# Patient Record
Sex: FEMALE | Race: WHITE | Employment: OTHER | URBAN - METROPOLITAN AREA
[De-identification: names, ages, dates, MRNs, and addresses within clinical notes are randomized per-mention and may not be internally consistent; named-entity substitution may affect disease eponyms.]

---

## 2017-01-30 PROBLEM — R14.3 EXCESSIVE FLATUS: Status: ACTIVE | Noted: 2017-01-30

## 2017-02-22 PROBLEM — N18.30 CKD (CHRONIC KIDNEY DISEASE) STAGE 3, GFR 30-59 ML/MIN (HCC): Chronic | Status: ACTIVE | Noted: 2017-02-22

## 2017-02-22 PROBLEM — G25.81 RLS (RESTLESS LEGS SYNDROME): Chronic | Status: ACTIVE | Noted: 2017-02-22

## 2017-02-22 PROBLEM — R41.82 ALTERED MENTAL STATUS: Status: ACTIVE | Noted: 2017-02-22

## 2017-02-22 PROBLEM — N39.0 UTI (URINARY TRACT INFECTION): Status: ACTIVE | Noted: 2017-02-22

## 2017-03-03 PROBLEM — F32.1 MODERATE SINGLE CURRENT EPISODE OF MAJOR DEPRESSIVE DISORDER (HCC): Chronic | Status: ACTIVE | Noted: 2017-03-03

## 2018-03-20 ENCOUNTER — HOSPITAL ENCOUNTER (OUTPATIENT)
Age: 83
Discharge: HOME | End: 2018-03-22
Payer: MEDICARE

## 2018-03-21 ENCOUNTER — HOSPITAL ENCOUNTER (OUTPATIENT)
Age: 83
Discharge: HOME | End: 2018-03-23
Payer: COMMERCIAL

## 2018-03-21 LAB
AMORPHOUS: ABNORMAL
BACTERIA: ABNORMAL /HPF
BILIRUBIN URINE: NEGATIVE
BLOOD, URINE: NEGATIVE
CLARITY: CLEAR
COLOR: YELLOW
GLUCOSE URINE: NEGATIVE MG/DL
KETONES, URINE: NEGATIVE MG/DL
LEUKOCYTE ESTERASE, URINE: ABNORMAL
NITRITE, URINE: NEGATIVE
PH UA: 6 (ref 5–9)
PROTEIN UA: ABNORMAL MG/DL
RBC UA: ABNORMAL /HPF (ref 0–2)
RENAL EPITHELIAL, UA: ABNORMAL /HPF
SPECIFIC GRAVITY UA: 1.01 (ref 1–1.03)
UROBILINOGEN, URINE: 0.2 E.U./DL
WBC UA: ABNORMAL /HPF (ref 0–5)

## 2018-03-21 PROCEDURE — 81001 URINALYSIS AUTO W/SCOPE: CPT

## 2018-03-21 PROCEDURE — 87088 URINE BACTERIA CULTURE: CPT

## 2018-03-21 PROCEDURE — 87186 SC STD MICRODIL/AGAR DIL: CPT

## 2018-03-21 PROCEDURE — 87077 CULTURE AEROBIC IDENTIFY: CPT

## 2018-03-24 LAB
ORGANISM: ABNORMAL
URINE CULTURE, ROUTINE: ABNORMAL
URINE CULTURE, ROUTINE: ABNORMAL

## 2018-04-02 ENCOUNTER — HOSPITAL ENCOUNTER (OUTPATIENT)
Age: 83
Discharge: HOME | End: 2018-04-04
Payer: COMMERCIAL

## 2018-04-02 LAB
ALBUMIN SERPL-MCNC: 3.9 G/DL (ref 3.5–5.2)
ALP BLD-CCNC: 63 U/L (ref 35–104)
ALT SERPL-CCNC: 6 U/L (ref 0–32)
ANION GAP SERPL CALCULATED.3IONS-SCNC: 12 MMOL/L (ref 7–16)
AST SERPL-CCNC: 8 U/L (ref 0–31)
BILIRUB SERPL-MCNC: 0.2 MG/DL (ref 0–1.2)
BUN BLDV-MCNC: 21 MG/DL (ref 8–23)
CALCIUM SERPL-MCNC: 9.1 MG/DL (ref 8.6–10.2)
CHLORIDE BLD-SCNC: 104 MMOL/L (ref 98–107)
CHOLESTEROL, TOTAL: 138 MG/DL (ref 0–199)
CO2: 26 MMOL/L (ref 22–29)
CREAT SERPL-MCNC: 1 MG/DL (ref 0.5–1)
GFR AFRICAN AMERICAN: >60
GFR NON-AFRICAN AMERICAN: 52 ML/MIN/1.73
GLUCOSE BLD-MCNC: 100 MG/DL (ref 74–109)
HBA1C MFR BLD: 5.3 % (ref 4.8–5.9)
HCT VFR BLD CALC: 31.6 % (ref 34–48)
HDLC SERPL-MCNC: 45 MG/DL
HEMOGLOBIN: 10.1 G/DL (ref 11.5–15.5)
LDL CHOLESTEROL CALCULATED: 65 MG/DL (ref 0–99)
MCH RBC QN AUTO: 28.1 PG (ref 26–35)
MCHC RBC AUTO-ENTMCNC: 32 % (ref 32–34.5)
MCV RBC AUTO: 87.8 FL (ref 80–99.9)
PDW BLD-RTO: 13.6 FL (ref 11.5–15)
PLATELET # BLD: 115 E9/L (ref 130–450)
PMV BLD AUTO: 11 FL (ref 7–12)
POTASSIUM SERPL-SCNC: 3.9 MMOL/L (ref 3.5–5)
RBC # BLD: 3.6 E12/L (ref 3.5–5.5)
SODIUM BLD-SCNC: 142 MMOL/L (ref 132–146)
TOTAL PROTEIN: 6.2 G/DL (ref 6.4–8.3)
TRIGL SERPL-MCNC: 141 MG/DL (ref 0–149)
VLDLC SERPL CALC-MCNC: 28 MG/DL
WBC # BLD: 4.2 E9/L (ref 4.5–11.5)

## 2018-04-02 PROCEDURE — 84443 ASSAY THYROID STIM HORMONE: CPT

## 2018-04-02 PROCEDURE — 80053 COMPREHEN METABOLIC PANEL: CPT

## 2018-04-02 PROCEDURE — 36415 COLL VENOUS BLD VENIPUNCTURE: CPT

## 2018-04-02 PROCEDURE — 80061 LIPID PANEL: CPT

## 2018-04-02 PROCEDURE — 83036 HEMOGLOBIN GLYCOSYLATED A1C: CPT

## 2018-04-02 PROCEDURE — 85027 COMPLETE CBC AUTOMATED: CPT

## 2018-04-03 LAB — TSH SERPL DL<=0.05 MIU/L-ACNC: 1.19 UIU/ML (ref 0.27–4.2)

## 2018-07-03 ENCOUNTER — HOSPITAL ENCOUNTER (OUTPATIENT)
Age: 83
Discharge: HOME | End: 2018-07-05
Payer: COMMERCIAL

## 2018-07-03 LAB
BACTERIA: ABNORMAL /HPF
BILIRUBIN URINE: NEGATIVE
BLOOD, URINE: ABNORMAL
CLARITY: ABNORMAL
COLOR: YELLOW
GLUCOSE URINE: NEGATIVE MG/DL
KETONES, URINE: NEGATIVE MG/DL
LEUKOCYTE ESTERASE, URINE: ABNORMAL
NITRITE, URINE: NEGATIVE
PH UA: 6 (ref 5–9)
PROTEIN UA: 30 MG/DL
RBC UA: ABNORMAL /HPF (ref 0–2)
SPECIFIC GRAVITY UA: 1.02 (ref 1–1.03)
UROBILINOGEN, URINE: 0.2 E.U./DL
WBC UA: ABNORMAL /HPF (ref 0–5)

## 2018-07-03 PROCEDURE — 87184 SC STD DISK METHOD PER PLATE: CPT

## 2018-07-03 PROCEDURE — 87186 SC STD MICRODIL/AGAR DIL: CPT

## 2018-07-03 PROCEDURE — 87088 URINE BACTERIA CULTURE: CPT

## 2018-07-03 PROCEDURE — 81001 URINALYSIS AUTO W/SCOPE: CPT

## 2018-07-03 PROCEDURE — 87077 CULTURE AEROBIC IDENTIFY: CPT

## 2018-07-07 ENCOUNTER — HOSPITAL ENCOUNTER (INPATIENT)
Age: 83
LOS: 2 days | Discharge: NURSING, REHAB OR LONG-TERM CARE | DRG: 065 | End: 2018-07-09
Attending: EMERGENCY MEDICINE | Admitting: INTERNAL MEDICINE
Payer: COMMERCIAL

## 2018-07-07 ENCOUNTER — APPOINTMENT (OUTPATIENT)
Dept: CT IMAGING | Age: 83
DRG: 065 | End: 2018-07-07
Payer: COMMERCIAL

## 2018-07-07 ENCOUNTER — APPOINTMENT (OUTPATIENT)
Dept: MRI IMAGING | Age: 83
DRG: 065 | End: 2018-07-07
Payer: COMMERCIAL

## 2018-07-07 ENCOUNTER — APPOINTMENT (OUTPATIENT)
Dept: ULTRASOUND IMAGING | Age: 83
DRG: 065 | End: 2018-07-07
Payer: COMMERCIAL

## 2018-07-07 ENCOUNTER — APPOINTMENT (OUTPATIENT)
Dept: GENERAL RADIOLOGY | Age: 83
DRG: 065 | End: 2018-07-07
Payer: COMMERCIAL

## 2018-07-07 DIAGNOSIS — G45.9 TRANSIENT CEREBRAL ISCHEMIA, UNSPECIFIED TYPE: Primary | ICD-10-CM

## 2018-07-07 PROBLEM — I63.9 CEREBROVASCULAR ACCIDENT (CVA) DETERMINED BY CLINICAL ASSESSMENT (HCC): Status: ACTIVE | Noted: 2018-07-07

## 2018-07-07 LAB
ALBUMIN SERPL-MCNC: 4.1 G/DL (ref 3.5–5.2)
ALP BLD-CCNC: 75 U/L (ref 35–104)
ALT SERPL-CCNC: 9 U/L (ref 0–32)
ANION GAP SERPL CALCULATED.3IONS-SCNC: 12 MMOL/L (ref 7–16)
AST SERPL-CCNC: 12 U/L (ref 0–31)
BASOPHILS ABSOLUTE: 0.04 E9/L (ref 0–0.2)
BASOPHILS RELATIVE PERCENT: 0.8 % (ref 0–2)
BILIRUB SERPL-MCNC: 0.2 MG/DL (ref 0–1.2)
BILIRUBIN URINE: NEGATIVE
BLOOD, URINE: NEGATIVE
BUN BLDV-MCNC: 19 MG/DL (ref 8–23)
CALCIUM SERPL-MCNC: 9.7 MG/DL (ref 8.6–10.2)
CHLORIDE BLD-SCNC: 103 MMOL/L (ref 98–107)
CLARITY: CLEAR
CO2: 28 MMOL/L (ref 22–29)
COLOR: YELLOW
CREAT SERPL-MCNC: 1.1 MG/DL (ref 0.5–1)
EKG ATRIAL RATE: 57 BPM
EKG P AXIS: 30 DEGREES
EKG P-R INTERVAL: 168 MS
EKG Q-T INTERVAL: 472 MS
EKG QRS DURATION: 136 MS
EKG QTC CALCULATION (BAZETT): 459 MS
EKG R AXIS: 1 DEGREES
EKG T AXIS: 144 DEGREES
EKG VENTRICULAR RATE: 57 BPM
EOSINOPHILS ABSOLUTE: 0.2 E9/L (ref 0.05–0.5)
EOSINOPHILS RELATIVE PERCENT: 3.9 % (ref 0–6)
GFR AFRICAN AMERICAN: 57
GFR NON-AFRICAN AMERICAN: 47 ML/MIN/1.73
GLUCOSE BLD-MCNC: 113 MG/DL (ref 74–109)
GLUCOSE URINE: NEGATIVE MG/DL
HCT VFR BLD CALC: 34.3 % (ref 34–48)
HEMOGLOBIN: 11 G/DL (ref 11.5–15.5)
IMMATURE GRANULOCYTES #: 0.01 E9/L
IMMATURE GRANULOCYTES %: 0.2 % (ref 0–5)
KETONES, URINE: NEGATIVE MG/DL
LEUKOCYTE ESTERASE, URINE: NEGATIVE
LYMPHOCYTES ABSOLUTE: 1.56 E9/L (ref 1.5–4)
LYMPHOCYTES RELATIVE PERCENT: 30.6 % (ref 20–42)
MCH RBC QN AUTO: 27.9 PG (ref 26–35)
MCHC RBC AUTO-ENTMCNC: 32.1 % (ref 32–34.5)
MCV RBC AUTO: 87.1 FL (ref 80–99.9)
METER GLUCOSE: 105 MG/DL (ref 70–110)
MONOCYTES ABSOLUTE: 0.52 E9/L (ref 0.1–0.95)
MONOCYTES RELATIVE PERCENT: 10.2 % (ref 2–12)
NEUTROPHILS ABSOLUTE: 2.77 E9/L (ref 1.8–7.3)
NEUTROPHILS RELATIVE PERCENT: 54.3 % (ref 43–80)
NITRITE, URINE: NEGATIVE
PDW BLD-RTO: 13 FL (ref 11.5–15)
PH UA: 7 (ref 5–9)
PLATELET # BLD: 143 E9/L (ref 130–450)
PMV BLD AUTO: 10.7 FL (ref 7–12)
POTASSIUM SERPL-SCNC: 4.1 MMOL/L (ref 3.5–5)
PROTEIN UA: NEGATIVE MG/DL
RBC # BLD: 3.94 E12/L (ref 3.5–5.5)
SODIUM BLD-SCNC: 143 MMOL/L (ref 132–146)
SPECIFIC GRAVITY UA: <=1.005 (ref 1–1.03)
TOTAL PROTEIN: 7 G/DL (ref 6.4–8.3)
TROPONIN: <0.01 NG/ML (ref 0–0.03)
UROBILINOGEN, URINE: 0.2 E.U./DL
WBC # BLD: 5.1 E9/L (ref 4.5–11.5)

## 2018-07-07 PROCEDURE — 71045 X-RAY EXAM CHEST 1 VIEW: CPT

## 2018-07-07 PROCEDURE — 82962 GLUCOSE BLOOD TEST: CPT

## 2018-07-07 PROCEDURE — 97161 PT EVAL LOW COMPLEX 20 MIN: CPT

## 2018-07-07 PROCEDURE — 99285 EMERGENCY DEPT VISIT HI MDM: CPT

## 2018-07-07 PROCEDURE — 80053 COMPREHEN METABOLIC PANEL: CPT

## 2018-07-07 PROCEDURE — 97166 OT EVAL MOD COMPLEX 45 MIN: CPT

## 2018-07-07 PROCEDURE — 2580000003 HC RX 258: Performed by: INTERNAL MEDICINE

## 2018-07-07 PROCEDURE — 36415 COLL VENOUS BLD VENIPUNCTURE: CPT

## 2018-07-07 PROCEDURE — 93005 ELECTROCARDIOGRAM TRACING: CPT | Performed by: EMERGENCY MEDICINE

## 2018-07-07 PROCEDURE — 70450 CT HEAD/BRAIN W/O DYE: CPT

## 2018-07-07 PROCEDURE — 99221 1ST HOSP IP/OBS SF/LOW 40: CPT | Performed by: PSYCHIATRY & NEUROLOGY

## 2018-07-07 PROCEDURE — G8978 MOBILITY CURRENT STATUS: HCPCS

## 2018-07-07 PROCEDURE — 6370000000 HC RX 637 (ALT 250 FOR IP): Performed by: INTERNAL MEDICINE

## 2018-07-07 PROCEDURE — 2060000000 HC ICU INTERMEDIATE R&B

## 2018-07-07 PROCEDURE — 84484 ASSAY OF TROPONIN QUANT: CPT

## 2018-07-07 PROCEDURE — G8988 SELF CARE GOAL STATUS: HCPCS

## 2018-07-07 PROCEDURE — 6360000002 HC RX W HCPCS: Performed by: INTERNAL MEDICINE

## 2018-07-07 PROCEDURE — 85025 COMPLETE CBC W/AUTO DIFF WBC: CPT

## 2018-07-07 PROCEDURE — 81003 URINALYSIS AUTO W/O SCOPE: CPT

## 2018-07-07 PROCEDURE — 6370000000 HC RX 637 (ALT 250 FOR IP)

## 2018-07-07 PROCEDURE — G8979 MOBILITY GOAL STATUS: HCPCS

## 2018-07-07 PROCEDURE — 70551 MRI BRAIN STEM W/O DYE: CPT

## 2018-07-07 PROCEDURE — 93880 EXTRACRANIAL BILAT STUDY: CPT

## 2018-07-07 PROCEDURE — G8987 SELF CARE CURRENT STATUS: HCPCS

## 2018-07-07 PROCEDURE — 6370000000 HC RX 637 (ALT 250 FOR IP): Performed by: EMERGENCY MEDICINE

## 2018-07-07 PROCEDURE — 94761 N-INVAS EAR/PLS OXIMETRY MLT: CPT

## 2018-07-07 RX ORDER — SIMVASTATIN 20 MG
20 TABLET ORAL EVERY EVENING
Status: DISCONTINUED | OUTPATIENT
Start: 2018-07-07 | End: 2018-07-07

## 2018-07-07 RX ORDER — ASPIRIN 81 MG/1
81 TABLET ORAL DAILY
Status: DISCONTINUED | OUTPATIENT
Start: 2018-07-07 | End: 2018-07-09 | Stop reason: HOSPADM

## 2018-07-07 RX ORDER — CARBIDOPA/LEVODOPA 25MG-250MG
1 TABLET ORAL 3 TIMES DAILY
Refills: 11 | COMMUNITY
Start: 2018-06-15 | End: 2018-09-05

## 2018-07-07 RX ORDER — CLOPIDOGREL BISULFATE 75 MG/1
75 TABLET ORAL DAILY
Status: DISCONTINUED | OUTPATIENT
Start: 2018-07-07 | End: 2018-07-09 | Stop reason: HOSPADM

## 2018-07-07 RX ORDER — AMLODIPINE BESYLATE 10 MG/1
10 TABLET ORAL DAILY
Status: DISCONTINUED | OUTPATIENT
Start: 2018-07-07 | End: 2018-07-09 | Stop reason: HOSPADM

## 2018-07-07 RX ORDER — ASPIRIN 81 MG/1
324 TABLET, CHEWABLE ORAL ONCE
Status: COMPLETED | OUTPATIENT
Start: 2018-07-07 | End: 2018-07-07

## 2018-07-07 RX ORDER — SUCRALFATE 1 G/1
1 TABLET ORAL
Status: DISCONTINUED | OUTPATIENT
Start: 2018-07-07 | End: 2018-07-09 | Stop reason: HOSPADM

## 2018-07-07 RX ORDER — MIRTAZAPINE 15 MG/1
7.5 TABLET, FILM COATED ORAL NIGHTLY
Status: DISCONTINUED | OUTPATIENT
Start: 2018-07-07 | End: 2018-07-09 | Stop reason: HOSPADM

## 2018-07-07 RX ORDER — ATORVASTATIN CALCIUM 40 MG/1
40 TABLET, FILM COATED ORAL NIGHTLY
Status: DISCONTINUED | OUTPATIENT
Start: 2018-07-07 | End: 2018-07-09 | Stop reason: HOSPADM

## 2018-07-07 RX ORDER — ACETAMINOPHEN 325 MG/1
650 TABLET ORAL EVERY 4 HOURS PRN
COMMUNITY

## 2018-07-07 RX ORDER — SIMETHICONE 80 MG
80 TABLET,CHEWABLE ORAL 4 TIMES DAILY PRN
Status: DISCONTINUED | OUTPATIENT
Start: 2018-07-07 | End: 2018-07-09 | Stop reason: HOSPADM

## 2018-07-07 RX ORDER — CARBIDOPA/LEVODOPA 25MG-250MG
1 TABLET ORAL 3 TIMES DAILY
Status: DISCONTINUED | OUTPATIENT
Start: 2018-07-07 | End: 2018-07-09 | Stop reason: HOSPADM

## 2018-07-07 RX ORDER — LINACLOTIDE 290 UG/1
290 CAPSULE, GELATIN COATED ORAL DAILY
Refills: 11 | COMMUNITY
Start: 2018-06-14

## 2018-07-07 RX ORDER — ACETAMINOPHEN 325 MG/1
650 TABLET ORAL EVERY 4 HOURS PRN
Status: DISCONTINUED | OUTPATIENT
Start: 2018-07-07 | End: 2018-07-09 | Stop reason: HOSPADM

## 2018-07-07 RX ORDER — BISACODYL 10 MG
10 SUPPOSITORY, RECTAL RECTAL DAILY PRN
Status: DISCONTINUED | OUTPATIENT
Start: 2018-07-07 | End: 2018-07-09 | Stop reason: HOSPADM

## 2018-07-07 RX ORDER — LISINOPRIL 20 MG/1
20 TABLET ORAL DAILY
Status: DISCONTINUED | OUTPATIENT
Start: 2018-07-07 | End: 2018-07-09

## 2018-07-07 RX ORDER — UBIDECARENONE 75 MG
1000 CAPSULE ORAL DAILY
COMMUNITY

## 2018-07-07 RX ORDER — FAMOTIDINE 20 MG/1
20 TABLET, FILM COATED ORAL DAILY
Status: DISCONTINUED | OUTPATIENT
Start: 2018-07-07 | End: 2018-07-09 | Stop reason: HOSPADM

## 2018-07-07 RX ORDER — CLOPIDOGREL BISULFATE 75 MG/1
TABLET ORAL
Status: COMPLETED
Start: 2018-07-07 | End: 2018-07-07

## 2018-07-07 RX ORDER — BISACODYL 10 MG
10 SUPPOSITORY, RECTAL RECTAL DAILY PRN
COMMUNITY

## 2018-07-07 RX ORDER — SODIUM CHLORIDE 0.9 % (FLUSH) 0.9 %
10 SYRINGE (ML) INJECTION PRN
Status: DISCONTINUED | OUTPATIENT
Start: 2018-07-07 | End: 2018-07-09 | Stop reason: HOSPADM

## 2018-07-07 RX ORDER — ACETAMINOPHEN 325 MG/1
650 TABLET ORAL DAILY
Status: ON HOLD | COMMUNITY
End: 2018-07-09 | Stop reason: HOSPADM

## 2018-07-07 RX ORDER — ONDANSETRON 2 MG/ML
4 INJECTION INTRAMUSCULAR; INTRAVENOUS EVERY 6 HOURS PRN
Status: DISCONTINUED | OUTPATIENT
Start: 2018-07-07 | End: 2018-07-09 | Stop reason: HOSPADM

## 2018-07-07 RX ORDER — NITROFURANTOIN 25; 75 MG/1; MG/1
100 CAPSULE ORAL 2 TIMES DAILY
Status: DISCONTINUED | OUTPATIENT
Start: 2018-07-07 | End: 2018-07-09 | Stop reason: HOSPADM

## 2018-07-07 RX ORDER — SODIUM CHLORIDE 0.9 % (FLUSH) 0.9 %
10 SYRINGE (ML) INJECTION EVERY 12 HOURS SCHEDULED
Status: DISCONTINUED | OUTPATIENT
Start: 2018-07-07 | End: 2018-07-09 | Stop reason: HOSPADM

## 2018-07-07 RX ORDER — NITROFURANTOIN 25; 75 MG/1; MG/1
100 CAPSULE ORAL 2 TIMES DAILY
Refills: 0 | Status: ON HOLD | COMMUNITY
Start: 2018-07-06 | End: 2018-07-09 | Stop reason: HOSPADM

## 2018-07-07 RX ADMIN — LISINOPRIL 20 MG: 20 TABLET ORAL at 13:28

## 2018-07-07 RX ADMIN — AMLODIPINE BESYLATE 10 MG: 10 TABLET ORAL at 13:28

## 2018-07-07 RX ADMIN — ATORVASTATIN CALCIUM 40 MG: 40 TABLET, FILM COATED ORAL at 22:42

## 2018-07-07 RX ADMIN — MIRTAZAPINE 7.5 MG: 15 TABLET, FILM COATED ORAL at 22:41

## 2018-07-07 RX ADMIN — SUCRALFATE 1 G: 1 TABLET ORAL at 13:36

## 2018-07-07 RX ADMIN — Medication 10 ML: at 22:43

## 2018-07-07 RX ADMIN — FAMOTIDINE 20 MG: 20 TABLET ORAL at 13:36

## 2018-07-07 RX ADMIN — CLOPIDOGREL BISULFATE 75 MG: 75 TABLET ORAL at 15:39

## 2018-07-07 RX ADMIN — ENOXAPARIN SODIUM 30 MG: 30 INJECTION SUBCUTANEOUS at 13:36

## 2018-07-07 RX ADMIN — SUCRALFATE 1 G: 1 TABLET ORAL at 22:42

## 2018-07-07 RX ADMIN — ASPIRIN 81 MG CHEWABLE TABLET 324 MG: 81 TABLET CHEWABLE at 07:19

## 2018-07-07 RX ADMIN — Medication 10 ML: at 13:29

## 2018-07-07 RX ADMIN — CARBIDOPA AND LEVODOPA 1 TABLET: 25; 250 TABLET ORAL at 22:41

## 2018-07-07 RX ADMIN — CARBIDOPA AND LEVODOPA 1 TABLET: 25; 250 TABLET ORAL at 13:27

## 2018-07-07 RX ADMIN — LINACLOTIDE 290 MCG: 145 CAPSULE, GELATIN COATED ORAL at 13:27

## 2018-07-07 ASSESSMENT — ENCOUNTER SYMPTOMS
CHEST TIGHTNESS: 0
ABDOMINAL PAIN: 0
TROUBLE SWALLOWING: 0
SHORTNESS OF BREATH: 0
BACK PAIN: 0
VOMITING: 0
NAUSEA: 0

## 2018-07-07 ASSESSMENT — PAIN SCALES - GENERAL
PAINLEVEL_OUTOF10: 0
PAINLEVEL_OUTOF10: 0

## 2018-07-07 NOTE — ED NOTES
Monitor received and placed on pt, rhythm verified on floor by [facility], transport paged     Miko James RN  07/07/18 2192

## 2018-07-07 NOTE — PROGRESS NOTES
Occupational Therapy  OCCUPATIONAL THERAPY INITIAL EVALUATION      Date:2018  Patient Name: Jules Murphy  MRN: [id number]  : 9/15/1929  Room: [address] [place] [address]     Evaluating OT: Genesis Sears OTR/L [id number]    AM-[facility] In[facility] Daily Activity Raw Score: ; G-CODE: CK    Modified [place] Scale (MRS)  Score     Description  0             No symptoms  1             No significant disability despite symptoms  2             Slight disability; able to look after own affairs  3             Moderate disability; able to ambulate without assist/ requires assist with ADLs  4             Moderate/Severe disability;requires assist to ambulate/assist with ADLs  5             Severe disability;bedridden/incontinent   6               Score:  1      Recommended Adaptive Equipment: TBD      Diagnosis:   Patient Active Problem List   Diagnosis    GERD (gastroesophageal reflux disease)    HTN (hypertension)    Depression with anxiety    Hyperlipidemia    Chronic back pain    Chronic constipation    Excessive flatus    UTI (urinary tract infection)    Altered mental status    CKD (chronic kidney disease) stage 3, GFR 30-59 ml/min    RLS (restless legs syndrome)    Moderate single current episode of major depressive disorder (HCC)    TIA (transient ischemic attack)       Surgery:   Past Surgical History:   Procedure Laterality Date    APPENDECTOMY      BACK SURGERY      BLADDER SURGERY      COLONOSCOPY      ENDOSCOPY, COLON, DIAGNOSTIC      EYE SURGERY      HYSTERECTOMY      SPINE SURGERY          Past Medical History:   Past Medical History:   Diagnosis Date    Arthritis     Blood circulation, collateral     Chronic kidney disease     GERD (gastroesophageal reflux disease)     Hyperlipidemia     Hypertension     Moderate single current [facility] of [facility] 3/3/2017    Movement disorder     Psychiatric problem     UTI (urinary tract infection) 2017       Precautions: falls, [place], L LE numbness       Home Living: Resident at [address] [place] [address] owned: [employer]    Prior Level of Function: pt reports she was indep with ADLs & Mod I with [employer]       Pain Level: 0/10    Cognition: oriented x 3; follows 2 step directions. Sup Problem solving skills  Sup Memory   indep Sequencing  SupSafety Awareness/Judgement  Additional Comments: N/A     Sensory:   Hearing: [place]  Vision: wears reading glasses       UE Assessment:  Hand Dominance: Right [x]  Left []     Strength ROM             Additional Info:    RUE           4/5             WFL            WFL               N/A      LUE 4/5 WFL            WFL               N/A        Sensation: intact  Tone: normal  Edema:N/A   FMC: WFL  GMC: WFL    Functional Assessment:   Initial Status: Comments:                               GOALS:   Feeding  setup N/A  indep   Grooming  Sup N/A  indep   Upper Body Dressing Kirsty   N/A   SBA    Lower Body Dressing Mod A  N/A  Kirsty     Bathing Mod A  N/A  Kirsty     Toileting  Min A  N/A  SBA    Bed Mobility  SBA  N/A  Sup   Functional Transfers Kirsty  [employer]  SBA     Functional Mobility Kirsty   [employer] SBA     Pt/Family participated in establishment of the goals. Sit balance: indep  Stand balance: SBA   Endurance/Activity tolerance: fair                              Comments: Upon arrival, pt supine in bed. At end of session, pt supine in bed with all devices within reach. Treatment: Patient performs func tasks as reported above. OT facilitated bed mobility/func mobility/toilet trf with [employer]. Educated pt/family in safety. Patient demonstrates fair understanding of education/instructions. Will benefit from continued OT intervention to increase participation in ADL tasks.      Assessment of current deficits   Functional mobility [x]  ADLs [x] Strength []  Cognition []  Functional transfers  [x] IADLs [] Safety Awareness [x]  Endurance [x]  Fine Motor Coordination [] Balance [x] Vision/perception [] Sensation []   Gross Motor Coordination [] ROM []        Eval Complexity: mod  Profile and History- mod  Assessment of Occupational Performance and Identification of Deficits- mod  Clinical Decision Making- mod    Treatment frequency: PRN     Plan of Care:  ADL retraining [x]   Equipment needs [x]   Neuromuscular re-education [x] Energy Conservation Techniques []  Functional Transfer training [x] Patient and/or Family Education [x]  Functional Mobility training [x]  Environmental Modifications []  Cognitive re-training []   Splinting Needs []     Positioning to improve overall function []  Other: []      Rehab Potential: fair for established goals     Patient / Family Goal: to go home       Patient and/or family verbalizes understanding of diagnosis, prognosis, goals and plan of care: yes    [] Malnutrition indicators have been identified and nursing has been notified to ensure a dietitian consult is ordered.      Time in: 11:00 am   Time out: 11:15 am   Eval Time: 15 min   Total Tx Time: 0 min     Bruce Jimenez, OTR/L [id number]

## 2018-07-07 NOTE — ED PROVIDER NOTES
This is an 80-year-old female that presents from nursing home for strokelike symptoms. She states that earlier in the left-sided weakness. ECF report that she was also having slurred speech. This has since resolved. They state that it occurred around 0300 and last known well was around 2300. EMS noted no symptoms when they arrived. Patient states that she has no stroke history. She is unsure of any medical problems or medications that she takes. She denies any traumas. Believes she had recently been diagnosed with a UTI, unsure what treatment she is taking. The history is provided by the patient and the EMS personnel. No  was used. Extremity Weakness   Severity:  Mild  Onset quality:  Sudden  Progression:  Resolved  Chronicity:  New  Context: urinary tract infection    Context: not alcohol use, not decreased sleep, not dehydration, not drug use, not increased activity and not stress    Associated symptoms: no abdominal pain, no chest pain, no dizziness, no drooling, no dysphagia, no dysuria, no falls, no fever, no frequency, no lethargy, no nausea, no shortness of breath, no urgency and no vomiting    Associated symptoms comment:  Ambulates with walker - this is unchanged  Risk factors: no coronary artery disease, no diabetes and no family hx of stroke        Review of Systems   Constitutional: Negative for activity change, appetite change, chills, fatigue and fever. HENT: Negative for drooling and trouble swallowing. Eyes: Negative for visual disturbance. Respiratory: Negative for chest tightness and shortness of breath. Cardiovascular: Negative for chest pain. Gastrointestinal: Negative for abdominal pain, dysphagia, nausea and vomiting. Genitourinary: Negative for dysuria, frequency and urgency. Musculoskeletal: Negative for back pain, falls, neck pain and neck stiffness. Skin: Negative for rash and wound.    Neurological: Negative for dizziness, light-headedness and (includes targeted exams where dose is matched to    clinical    indication); or iterative reconstruction. Coronal and sagittal reformatted images were created and reviewed. COMPARISON:     CTB HEAD W/O CONTRAST 2017-03-01 23:15      FINDINGS:     Brain:  Generalized parenchymal atrophy present which is compatible with       patient age. Chronic ischemic gliotic white matter changes present. No    acute    subarachnoid, parenchymal or intraventricular hemorrhage. No abnormal    mass    lesion, midline shift or mass effect. No acute subdural or epidural    hematoma. No definite acute infarct. Ventricles:  Atrophy related ventriculomegaly/ex vacuo dilatation    present. Bones/joints:  No acute calvarial fracture. Soft tissues:  No mass. Vasculature:  Arterial atheromatous vascular calcifications present. Sinuses:  No acute sinusitis. Mastoid air cells:  Significant right mastoid air cell fluid. Minimal    left    mastoid air cell fluid. IMPRESSION:          No acute intracranial abnormality. Remainder of findings as above. Using a telephone device, Dr. Henny Yañez discussed the findings of the report    with    Dr. Pablo Grewal at 7:08 AM EDT on 7/7/2018 . This addendum has been electronically signed by Mahendra Fairchild MD.      Final     No acute intracranial abnormality. Remainder of findings as above. This report has been electronically signed by Mahendra Fairchild MD.      XR CHEST PORTABLE    (Results Pending)       EKG: This EKG is signed and interpreted by ED Physician. Time: 527  Rate: 57  Rhythm: sinus bradycardia, LBBB. Interpretation: sinus bradycardia lbbb, t wave inversions leads 1, aVL, V5-6. Comparison: new T wave inversions.    ---------------------------- NURSING NOTES AND VITALS REVIEWED -------------------------   The nursing notes within the ED encounter and vital signs as below have been reviewed.    BP (!) 189/77   Pulse 59

## 2018-07-07 NOTE — PROGRESS NOTES
Dr. Dani Escobar went in to see the patient and the patient reported that her symptoms had returned.  Patient's L side has become weak again, a change from her earlier neuro exam.  Blood pressure [id number] Dr. Dani Escobar aware

## 2018-07-07 NOTE — CONSULTS
Gaston Badillo is a 80 y.o. woman  Neurology consulted for stroke    Past Medical History:     Past Medical History:   Diagnosis Date    Arthritis     Blood circulation, collateral     Chronic kidney disease     GERD (gastroesophageal reflux disease)     Hyperlipidemia     Hypertension     Moderate single current episode of major depressive disorder ([facility] 3/3/2017    Movement disorder     Psychiatric problem     UTI (urinary tract infection) 2/22/2017       Past Surgical History:     Past Surgical History:   Procedure Laterality Date    APPENDECTOMY      BACK SURGERY      BLADDER SURGERY      COLONOSCOPY      ENDOSCOPY, COLON, DIAGNOSTIC      EYE SURGERY      HYSTERECTOMY      SPINE SURGERY         Allergies:     Penicillins    Medications:     Prior to Admission medications    Medication Sig Start Date End Date Taking?  Authorizing Provider   acetaminophen (TYLENOL) 325 MG tablet Take 650 mg by mouth daily   Yes Historical Provider, MD   acetaminophen (TYLENOL) 325 MG tablet Take 650 mg by mouth every 4 hours as needed for Pain or Fever   Yes Historical Provider, MD   hypromellose 0.4 % SOLN ophthalmic solution Place 1 drop into both eyes every 6 hours as needed   Yes Historical Provider, MD   bisacodyl (DULCOLAX) 10 MG suppository Place 10 mg rectally daily as needed for Constipation   Yes Historical Provider, MD   LINZESS 290 MCG CAPS capsule Take 290 mcg by mouth daily 6/14/18  Yes Historical Provider, MD   Cholecalciferol (VITAMIN D3) 5000 units CAPS Take 5,000 Units by mouth daily 6/15/18  Yes Historical Provider, MD   nitrofurantoin, macrocrystal-monohydrate, (MACROBID) 100 MG capsule Take 100 mg by mouth 2 times daily 7/6/18 7/7/18 Yes Historical Provider, MD   carbidopa-levodopa (SINEMET)  MG per tablet Take 1 tablet by mouth 3 times daily 6/15/18  Yes Historical Provider, MD   vitamin B-12 (CYANOCOBALAMIN) 100 MCG tablet Take 1,000 mcg by mouth daily   Yes Historical Provider, MD   mirtazapine (REMERON) 7.5 MG tablet Take 1 tablet by mouth nightly 3/8/17  Yes Kaylah Urbina MD   sucralfate (CARAFATE) 1 GM tablet Take 1 tablet by mouth 4 times daily (with meals and nightly) 2/27/17  Yes Karo Zhou MD   simethicone (MYLICON) 80 MG chewable tablet Take 1 tablet by mouth 4 times daily as needed for Flatulence 1/30/17  Yes Momo Martel MD   simvastatin (ZOCOR) 20 MG tablet Take 1 tablet by mouth every evening 1/6/17  Yes Momo Martel MD   aspirin 81 MG EC tablet Take 81 mg by mouth daily. Yes Historical Provider, MD       Social History:     nonhsmoker    Review of Systems:     ROS    All others negative      Family History:     History reviewed. No pertinent family history. History of Present Illness:   History obtained from pt and EMR  Pt presented with sudden onset left sided weakness which resolved in ED. Since then it has fluctuated. Per nursing she had no weakness prior to mri. Per patient she had some weakness at time of MRI but it is now weaker. During my evaluation around 3pm, pt has NIHSS of 5. Blood pressure 180s at this time. Objective:   BP (!) 184/73   Pulse 61   Temp 97 °F (36.1 °C)   Resp 18   Ht 5' (1.524 m)   Wt 150 lb (68 kg)   SpO2 96%   BMI 29.29 kg/m²       Neurologic Exam     Mental Status   Oriented to person, place, and time. Attention: decreased. Concentration: decreased. Speech: speech is normal   Level of consciousness: alert  Knowledge: good. Able to name object. Able to read. Able to repeat. Normal comprehension. Poor memory evident. Cranial Nerves     CN II   Visual fields full to confrontation. CN III, IV, VI   Pupils are equal, round, and reactive to light. Extraocular motions are normal.     CN V   Facial sensation intact. CN VII   Facial expression full, symmetric.      CN VIII   Hearing: intact    CN IX, X   Palate: symmetric    CN XI   Left trapezius strength: weak    CN XII

## 2018-07-07 NOTE — H&P
[facility] Internal Medicine  History and Physical      CHIEF COMPLAINT:  Left sided weakness, speech issues    Reason for Admission:  Suspected cva    History Obtained From:  Patient and emr    PCP :  Karthik Johnson MD    [address] [place] [address], [address] / [facility] [id number]      HISTORY OF PRESENT ILLNESS:      The patient is a 80 y.o. female sent in from nursing home for left sided weakness and speech issues. Apparently her symptoms resolved as per ED documentation how ever patient still reporting left sided weakness. She has no head ache, trauma etc. She has elected to be DNR cc and does not ant aggressive measures.         Past Medical History:        Diagnosis Date    Arthritis     Blood circulation, collateral     Chronic kidney disease     GERD (gastroesophageal reflux disease)     Hyperlipidemia     Hypertension     Moderate single current episode of major depressive disorder ([facility] 3/3/2017    Movement disorder     Psychiatric problem     UTI (urinary tract infection) 2/22/2017     Past Surgical History:        Procedure Laterality Date    APPENDECTOMY      BACK SURGERY      BLADDER SURGERY      COLONOSCOPY      ENDOSCOPY, COLON, DIAGNOSTIC      EYE SURGERY      HYSTERECTOMY      SPINE SURGERY           Medications Prior to Admission:    Prescriptions Prior to Admission: acetaminophen (TYLENOL) 325 MG tablet, Take 650 mg by mouth daily  acetaminophen (TYLENOL) 325 MG tablet, Take 650 mg by mouth every 4 hours as needed for Pain or Fever  hypromellose 0.4 % SOLN ophthalmic solution, Place 1 drop into both eyes every 6 hours as needed  bisacodyl (DULCOLAX) 10 MG suppository, Place 10 mg rectally daily as needed for Constipation  LINZESS 290 MCG CAPS capsule, Take 290 mcg by mouth daily  Cholecalciferol (VITAMIN D3) 5000 units CAPS, Take 5,000 Units by mouth daily  nitrofurantoin, macrocrystal-monohydrate, (MACROBID) 100 MG capsule, Take 100 mg by mouth 2 times daily  carbidopa-levodopa (SINEMET)  MG per tablet, Take 1 tablet by mouth 3 times daily  vitamin B-12 (CYANOCOBALAMIN) 100 MCG tablet, Take 1,000 mcg by mouth daily  mirtazapine (REMERON) 7.5 MG tablet, Take 1 tablet by mouth nightly  sucralfate (CARAFATE) 1 GM tablet, Take 1 tablet by mouth 4 times daily (with meals and nightly)  simethicone (MYLICON) 80 MG chewable tablet, Take 1 tablet by mouth 4 times daily as needed for Flatulence  simvastatin (ZOCOR) 20 MG tablet, Take 1 tablet by mouth every evening  aspirin 81 MG EC tablet, Take 81 mg by mouth daily. Allergies:  Penicillins    Social History:   Social History     Social History    Marital status:      Spouse name: N/A    Number of children: N/A    Years of education: N/A     Occupational History    Not on file. Social History Main Topics    Smoking status: Never Smoker    Smokeless tobacco: Never Used    Alcohol use No    Drug use: No    Sexual activity: Not on file     Other Topics Concern    Not on file     Social History Narrative    No narrative on file         Family History:   History reviewed. No pertinent family history. REVIEW OF SYSTEMS:    General ROS: negative  Hematological and Lymphatic ROS: negative  Endocrine ROS: negative  Respiratory ROS: no cough,  wheezing  or shortness of breath,   Cardiovascular ROS: no chest pain or dyspnea on exertion  Gastrointestinal ROS: no abdominal pain, change in bowel habits, or black or bloody stools  Genito-Urinary ROS: no dysuria, trouble voiding, or hematuria  Neurological ROS: positive for left sided weakness and slurred speech    Vitals:  BP (!) 174/77   Pulse 59   Temp 97 °F (36.1 °C)   Resp 14   Ht 5' (1.524 m)   Wt 150 lb (68 kg)   SpO2 96%   BMI 29.29 kg/m²     PHYSICAL EXAM:  General:  Awake, alert, oriented X 3. Well developed, well nourished, well groomed. No apparent distress. HEENT:  Normocephalic, atraumatic. Pupils equal, round, reactive to light. No scleral icterus.   No Basophils % 0.8 0.0 - 2.0 %    Neutrophils # 2.77 1.80 - 7.30 E9/L    Immature Granulocytes # 0.01 E9/L    Lymphocytes # 1.56 1.50 - 4.00 E9/L    Monocytes # 0.52 0.10 - 0.95 E9/L    Eosinophils # 0.20 0.05 - 0.50 E9/L    Basophils # 0.04 0.00 - 0.20 E9/L   Comprehensive Metabolic Panel    Collection Time: 07/07/18  6:00 AM   Result Value Ref Range    Sodium 143 132 - 146 mmol/L    Potassium 4.1 3.5 - 5.0 mmol/L    Chloride 103 98 - 107 mmol/L    CO2 28 22 - 29 mmol/L    Anion Gap 12 7 - 16 mmol/L    Glucose 113 (H) 74 - 109 mg/dL    BUN 19 8 - 23 mg/dL    CREATININE 1.1 (H) 0.5 - 1.0 mg/dL    GFR Non-African American 47 >=60 mL/min/1.73    GFR African American 57     Calcium 9.7 8.6 - 10.2 mg/dL    Total Protein 7.0 6.4 - 8.3 g/dL    Alb 4.1 3.5 - 5.2 g/dL    Total Bilirubin 0.2 0.0 - 1.2 mg/dL    Alkaline Phosphatase 75 35 - 104 U/L    ALT 9 0 - 32 U/L    AST 12 0 - 31 U/L   Troponin    Collection Time: 07/07/18  6:00 AM   Result Value Ref Range    Troponin <0.01 0.00 - 0.03 ng/mL       XR CHEST PORTABLE   Final Result   Tortuous ectatic aorta   Cardiomegaly   Airspace disease compatible with pneumonia   There are patchy infiltrates seen throughout both the lung fields. Underlying edema could also have this appearance   The chest appears to be worse in the interval               CT Head WO Contrast   Final Result   Addendum 1 of 1   EXAM:     CT Head Without Intravenous Contrast      EXAM DATE/TIME:     Exam ordered 7/7/2018 5:45 AM      CLINICAL HISTORY:     80years old, female; Signs and symptoms;  Other: Resolved l sided    weakness,    slurred speech      TECHNIQUE:     Axial computed tomography images of the head/brain without intravenous    contrast.  All CT scans at this facility use at least one of these dose    optimization techniques: automated exposure control; mA and/or kV    adjustment    per patient size (includes targeted exams where dose is matched to    clinical    indication); or iterative using voice recognition software. Every effort was made to ensure accuracy; however, inadvertent transcription errors may be present      UPDATED H&P    the patient is a 80 y.o. white woman followed by DR Antonio Porras who presents to the hospital secondary to new onset left sided weakness and speech difficulties. Initial clinical exam was consistent with TIA. She was admitted and evaluated by neurology. After admission, her left sided weakness worsened. Further brain imaging with MRI confirmed acute right sided lacunar infarct. Plavix was added to her aspirin regimen, and statin was upgraded from zocor to lipitor. Carotid US did not demonstrate any significant stenosis. BP medications were titrated to improve BP control. Once neurological work up was complete and patient clinically stable, she was discharged back to [facility] for further care/functional improvement.     Electronically signed by Mariela Moseley MD on 7/9/18 at 12:08 PM

## 2018-07-08 ENCOUNTER — APPOINTMENT (OUTPATIENT)
Dept: CT IMAGING | Age: 83
DRG: 065 | End: 2018-07-08
Payer: COMMERCIAL

## 2018-07-08 LAB
BASOPHILS ABSOLUTE: 0.02 E9/L (ref 0–0.2)
BASOPHILS RELATIVE PERCENT: 0.4 % (ref 0–2)
EOSINOPHILS ABSOLUTE: 0.23 E9/L (ref 0.05–0.5)
EOSINOPHILS RELATIVE PERCENT: 4.1 % (ref 0–6)
HCT VFR BLD CALC: 33.4 % (ref 34–48)
HEMOGLOBIN: 11.3 G/DL (ref 11.5–15.5)
IMMATURE GRANULOCYTES #: 0.02 E9/L
IMMATURE GRANULOCYTES %: 0.4 % (ref 0–5)
LYMPHOCYTES ABSOLUTE: 1.88 E9/L (ref 1.5–4)
LYMPHOCYTES RELATIVE PERCENT: 33.6 % (ref 20–42)
MCH RBC QN AUTO: 28.9 PG (ref 26–35)
MCHC RBC AUTO-ENTMCNC: 33.8 % (ref 32–34.5)
MCV RBC AUTO: 85.4 FL (ref 80–99.9)
MONOCYTES ABSOLUTE: 0.49 E9/L (ref 0.1–0.95)
MONOCYTES RELATIVE PERCENT: 8.8 % (ref 2–12)
NEUTROPHILS ABSOLUTE: 2.95 E9/L (ref 1.8–7.3)
NEUTROPHILS RELATIVE PERCENT: 52.7 % (ref 43–80)
PDW BLD-RTO: 13.3 FL (ref 11.5–15)
PLATELET # BLD: 141 E9/L (ref 130–450)
PMV BLD AUTO: 10.7 FL (ref 7–12)
RBC # BLD: 3.91 E12/L (ref 3.5–5.5)
WBC # BLD: 5.6 E9/L (ref 4.5–11.5)

## 2018-07-08 PROCEDURE — 70450 CT HEAD/BRAIN W/O DYE: CPT

## 2018-07-08 PROCEDURE — 36415 COLL VENOUS BLD VENIPUNCTURE: CPT

## 2018-07-08 PROCEDURE — 6370000000 HC RX 637 (ALT 250 FOR IP): Performed by: INTERNAL MEDICINE

## 2018-07-08 PROCEDURE — 2580000003 HC RX 258: Performed by: INTERNAL MEDICINE

## 2018-07-08 PROCEDURE — 85025 COMPLETE CBC W/AUTO DIFF WBC: CPT

## 2018-07-08 PROCEDURE — 6360000002 HC RX W HCPCS: Performed by: INTERNAL MEDICINE

## 2018-07-08 PROCEDURE — 6370000000 HC RX 637 (ALT 250 FOR IP): Performed by: PSYCHIATRY & NEUROLOGY

## 2018-07-08 PROCEDURE — 2060000000 HC ICU INTERMEDIATE R&B

## 2018-07-08 RX ORDER — LORAZEPAM 0.5 MG/1
0.5 TABLET ORAL EVERY 4 HOURS PRN
Status: DISCONTINUED | OUTPATIENT
Start: 2018-07-08 | End: 2018-07-09 | Stop reason: HOSPADM

## 2018-07-08 RX ADMIN — CARBIDOPA AND LEVODOPA 1 TABLET: 25; 250 TABLET ORAL at 13:30

## 2018-07-08 RX ADMIN — ENOXAPARIN SODIUM 30 MG: 30 INJECTION SUBCUTANEOUS at 10:10

## 2018-07-08 RX ADMIN — ATORVASTATIN CALCIUM 40 MG: 40 TABLET, FILM COATED ORAL at 20:50

## 2018-07-08 RX ADMIN — AMLODIPINE BESYLATE 10 MG: 10 TABLET ORAL at 10:11

## 2018-07-08 RX ADMIN — Medication 10 ML: at 20:50

## 2018-07-08 RX ADMIN — LISINOPRIL 20 MG: 20 TABLET ORAL at 10:11

## 2018-07-08 RX ADMIN — SUCRALFATE 1 G: 1 TABLET ORAL at 11:45

## 2018-07-08 RX ADMIN — CARBIDOPA AND LEVODOPA 1 TABLET: 25; 250 TABLET ORAL at 20:50

## 2018-07-08 RX ADMIN — LINACLOTIDE 290 MCG: 145 CAPSULE, GELATIN COATED ORAL at 10:10

## 2018-07-08 RX ADMIN — SUCRALFATE 1 G: 1 TABLET ORAL at 20:50

## 2018-07-08 RX ADMIN — CLOPIDOGREL BISULFATE 75 MG: 75 TABLET, FILM COATED ORAL at 10:16

## 2018-07-08 RX ADMIN — Medication 10 ML: at 10:14

## 2018-07-08 RX ADMIN — MIRTAZAPINE 7.5 MG: 15 TABLET, FILM COATED ORAL at 20:50

## 2018-07-08 RX ADMIN — CARBIDOPA AND LEVODOPA 1 TABLET: 25; 250 TABLET ORAL at 10:13

## 2018-07-08 RX ADMIN — FAMOTIDINE 20 MG: 20 TABLET ORAL at 10:11

## 2018-07-08 RX ADMIN — SUCRALFATE 1 G: 1 TABLET ORAL at 08:00

## 2018-07-08 RX ADMIN — ASPIRIN 81 MG: 81 TABLET, COATED ORAL at 10:11

## 2018-07-08 RX ADMIN — SUCRALFATE 1 G: 1 TABLET ORAL at 17:36

## 2018-07-08 ASSESSMENT — PAIN SCALES - GENERAL
PAINLEVEL_OUTOF10: 0
PAINLEVEL_OUTOF10: 0

## 2018-07-08 NOTE — PROGRESS NOTES
Subjective:    Chief complaint:    Her left-sided weakness is worse  on Ativan for anxiety  Mejia had to be inserted for urinary retention    Objective:    BP (!) 140/76   Pulse 58   Temp 98 °F (36.7 °C) (Temporal)   Resp 16   Ht 5' (1.524 m)   Wt 156 lb 8 oz (71 kg)   SpO2 97%   BMI 30.56 kg/m²   General : Awake ,alert,no distress. Heart:  RRR, no murmurs, gallops, or rubs. Lungs:  CTA bilaterally, no wheeze, rales or rhonchi  Abd: bowel sounds present, nontender, nondistended, no masses  Extrem:  No clubbing, cyanosis, or edema  Mejia is in place  Left-sided weakness worse than yesterday especially left upper extremity    CBC:   Lab Results   Component Value Date    WBC 5.6 2018    RBC 3.91 2018    HGB 11.3 2018    HCT 33.4 2018    MCV 85.4 2018    MCH 28.9 2018    MCHC 33.8 2018    RDW 13.3 2018     2018    MPV 10.7 2018     BMP:    Lab Results   Component Value Date     2018    K 4.1 2018     2018    CO2 28 2018    BUN 19 2018    LABALBU 4.1 2018    CREATININE 1.1 2018    CALCIUM 9.7 2018    GFRAA 57 2018    LABGLOM 47 2018    GLUCOSE 113 2018     PT/INR:  No results found for: PROTIME, INR  Troponin:    Lab Results   Component Value Date    TROPONINI <0.01 2018       No results for input(s): LABURIN in the last 72 hours. No results for input(s): BC in the last 72 hours. No results for input(s): [employer]na [employer]s in the last 72 hours. Ct Head Wo Contrast    Result Date: 2018  Patient MRN:  [id number] : 9/15/1929 Age: 80 years Gender: Female Order Date:  2018 7:30 AM EXAM: CT HEAD WO CONTRAST NUMBER OF IMAGES:  102 INDICATION:  L sided weakness  COMPARISON: 2018 TECHNIQUE:   Noncontrast CT of the head was performed. Coronal and sagittal reconstructions were obtained.  Dose optimization techniques utilized including automatic exposure Arterial atheromatous vascular calcifications present. Sinuses:  No acute sinusitis. Mastoid air cells:  Significant right mastoid air cell fluid. Minimal left mastoid air cell fluid. IMPRESSION:       No acute intracranial abnormality. Remainder of findings as above. Using a telephone device, Dr. Barrie Dai discussed the findings of the report with Dr. Chidi Hsieh at 7:08 AM EDT on 7/7/2018 . This addendum has been electronically signed by Aj Perla MD.    Result Date: 7/7/2018  EXAM:   CT Head Without Intravenous Contrast EXAM DATE/TIME:   Exam ordered 7/7/2018 5:45 AM CLINICAL HISTORY:   80years old, female; Signs and symptoms; Other: Resolved l sided weakness, slurred speech TECHNIQUE:   Axial computed tomography images of the head/brain without intravenous contrast.  All CT scans at this facility use at least one of these dose optimization techniques: automated exposure control; mA and/or kV adjustment per patient size (includes targeted exams where dose is matched to clinical indication); or iterative reconstruction. Coronal and sagittal reformatted images were created and reviewed. COMPARISON:   CTB HEAD W/O CONTRAST 2017-03-01 23:15 FINDINGS:   Brain:  Generalized parenchymal atrophy present which is compatible with patient age. Chronic ischemic gliotic white matter changes present. No acute subarachnoid, parenchymal or intraventricular hemorrhage. No abnormal mass lesion, midline shift or mass effect. No acute subdural or epidural hematoma. No definite acute infarct. Ventricles:  Atrophy related ventriculomegaly/ex vacuo dilatation present. Bones/joints:  No acute calvarial fracture. Soft tissues:  No mass. Vasculature:  Arterial atheromatous vascular calcifications present. Sinuses:  No acute sinusitis. Mastoid air cells:  Significant right mastoid air cell fluid. Minimal left mastoid air cell fluid. No acute intracranial abnormality. Remainder of findings as above. This report has been electronically signed by Carlitos Brumfield MD.    Xr Chest Portable    Result Date: 2018  Patient MRN: [id number] : 9/15/1929 Age:  80 years Gender: Female Order Date: 2018 5:45 AM Exam: XR CHEST PORTABLE Number of Images: 1 view Indication:   possible TIA possible TIA Comparison: 3/1/2017 Findings: The heart is enlarged. The lung fields demonstrate evidence for airspace disease. The aorta is tortuous and ectatic. Tortuous ectatic aorta Cardiomegaly Airspace disease compatible with pneumonia There are patchy infiltrates seen throughout both the lung fields. Underlying edema could also have this appearance The chest appears to be worse in the interval     Mri Brain Wo Contrast    Result Date: 2018  Patient MRN:  [id number] : 9/15/1929 Age: 80 years Gender: Female Order Date:  2018 8:45 AM EXAM: MRI BRAIN WO CONTRAST NUMBER OF IMAGES:  275 INDICATION:  tia  COMPARISON: 2018 TECHNIQUE:   Multiplanar, multisequence MRI of the brain was performed without intravenous contrast.  FINDINGS:   There is generalized volume loss and chronic small vessel disease. Acute lacunar infarct seen in the right periventricular white matter. Brain parenchyma is otherwise unremarkable with no evidence of hemorrhage or mass lesion. Basilar cisterns are preserved. No hydronephrosis or extra-axial fluid collection seen. There are normal vascular flow voids. Orbits are unremarkable. Bilateral mastoid effusions. There is mild mucosal thickening in the bilateral maxillary sinuses. Bone marrow signal is normal for age. Acute lacunar infarcts in the right periventricular white matter.      Us Carotid Artery Bilateral    Result Date: 2018  Patient MRN:  [id number] : 9/15/1929 Age: 80 years Gender: Female Order Date:  2018 4:45 PM EXAM: US CAROTID ARTERY BILATERAL INDICATION: Left-sided weakness, speech issues COMPARISON: None NUMBER OF IMAGES:  62 FINDINGS: Duplex and color flow Doppler of

## 2018-07-08 NOTE — PROGRESS NOTES
Oneil Jimenez texted via perfect [employer]. Patient complaining of urge to void, unable to urinate. Bladder scan performed. 779 ml residual. Awaiting call back or new orders.

## 2018-07-09 VITALS
WEIGHT: 150 LBS | RESPIRATION RATE: 16 BRPM | BODY MASS INDEX: 29.45 KG/M2 | DIASTOLIC BLOOD PRESSURE: 60 MMHG | OXYGEN SATURATION: 94 % | HEART RATE: 70 BPM | TEMPERATURE: 98.8 F | HEIGHT: 60 IN | SYSTOLIC BLOOD PRESSURE: 151 MMHG

## 2018-07-09 PROBLEM — R41.82 ALTERED MENTAL STATUS: Status: RESOLVED | Noted: 2017-02-22 | Resolved: 2018-07-09

## 2018-07-09 PROBLEM — R14.3 EXCESSIVE FLATUS: Status: RESOLVED | Noted: 2017-01-30 | Resolved: 2018-07-09

## 2018-07-09 PROBLEM — G45.9 TIA (TRANSIENT ISCHEMIC ATTACK): Status: RESOLVED | Noted: 2018-07-07 | Resolved: 2018-07-09

## 2018-07-09 PROBLEM — N39.0 UTI (URINARY TRACT INFECTION): Status: RESOLVED | Noted: 2017-02-22 | Resolved: 2018-07-09

## 2018-07-09 LAB
ORGANISM: ABNORMAL
URINE CULTURE, ROUTINE: ABNORMAL
URINE CULTURE, ROUTINE: ABNORMAL

## 2018-07-09 PROCEDURE — 6360000002 HC RX W HCPCS: Performed by: INTERNAL MEDICINE

## 2018-07-09 PROCEDURE — 2580000003 HC RX 258: Performed by: INTERNAL MEDICINE

## 2018-07-09 PROCEDURE — 6370000000 HC RX 637 (ALT 250 FOR IP): Performed by: INTERNAL MEDICINE

## 2018-07-09 PROCEDURE — 6370000000 HC RX 637 (ALT 250 FOR IP): Performed by: PSYCHIATRY & NEUROLOGY

## 2018-07-09 RX ORDER — LISINOPRIL 20 MG/1
40 TABLET ORAL DAILY
Status: DISCONTINUED | OUTPATIENT
Start: 2018-07-10 | End: 2018-07-09 | Stop reason: HOSPADM

## 2018-07-09 RX ORDER — ATORVASTATIN CALCIUM 40 MG/1
40 TABLET, FILM COATED ORAL NIGHTLY
Qty: 30 TABLET | Refills: 3 | DISCHARGE
Start: 2018-07-09

## 2018-07-09 RX ORDER — LISINOPRIL 40 MG/1
40 TABLET ORAL DAILY
Qty: 30 TABLET | Refills: 3 | DISCHARGE
Start: 2018-07-10

## 2018-07-09 RX ORDER — AMLODIPINE BESYLATE 10 MG/1
10 TABLET ORAL DAILY
Qty: 30 TABLET | Refills: 3 | DISCHARGE
Start: 2018-07-10

## 2018-07-09 RX ORDER — CLOPIDOGREL BISULFATE 75 MG/1
75 TABLET ORAL DAILY
Qty: 30 TABLET | Refills: 3 | DISCHARGE
Start: 2018-07-10 | End: 2019-01-15

## 2018-07-09 RX ADMIN — ENOXAPARIN SODIUM 30 MG: 30 INJECTION SUBCUTANEOUS at 08:29

## 2018-07-09 RX ADMIN — AMLODIPINE BESYLATE 10 MG: 10 TABLET ORAL at 08:29

## 2018-07-09 RX ADMIN — CARBIDOPA AND LEVODOPA 1 TABLET: 25; 250 TABLET ORAL at 13:02

## 2018-07-09 RX ADMIN — LINACLOTIDE 290 MCG: 145 CAPSULE, GELATIN COATED ORAL at 08:29

## 2018-07-09 RX ADMIN — CARBIDOPA AND LEVODOPA 1 TABLET: 25; 250 TABLET ORAL at 08:29

## 2018-07-09 RX ADMIN — CLOPIDOGREL BISULFATE 75 MG: 75 TABLET, FILM COATED ORAL at 08:29

## 2018-07-09 RX ADMIN — FAMOTIDINE 20 MG: 20 TABLET ORAL at 08:29

## 2018-07-09 RX ADMIN — Medication 10 ML: at 08:29

## 2018-07-09 RX ADMIN — ASPIRIN 81 MG: 81 TABLET, COATED ORAL at 08:29

## 2018-07-09 RX ADMIN — SUCRALFATE 1 G: 1 TABLET ORAL at 13:02

## 2018-07-09 RX ADMIN — LISINOPRIL 20 MG: 20 TABLET ORAL at 08:29

## 2018-07-09 RX ADMIN — SUCRALFATE 1 G: 1 TABLET ORAL at 08:29

## 2018-07-09 ASSESSMENT — PAIN SCALES - GENERAL
PAINLEVEL_OUTOF10: 0
PAINLEVEL_OUTOF10: 0

## 2018-07-15 LAB
Lab: NORMAL
REPORT: NORMAL
THIS TEST SENT TO: NORMAL

## 2018-08-15 ENCOUNTER — HOSPITAL ENCOUNTER (OUTPATIENT)
Age: 83
Discharge: HOME | End: 2018-08-17
Payer: COMMERCIAL

## 2018-08-15 LAB
ANION GAP SERPL CALCULATED.3IONS-SCNC: 14 MMOL/L (ref 7–16)
BACTERIA: ABNORMAL /HPF
BILIRUBIN URINE: NEGATIVE
BLOOD, URINE: NEGATIVE
BUN BLDV-MCNC: 32 MG/DL (ref 8–23)
CALCIUM SERPL-MCNC: 10 MG/DL (ref 8.6–10.2)
CHLORIDE BLD-SCNC: 108 MMOL/L (ref 98–107)
CLARITY: CLEAR
CO2: 24 MMOL/L (ref 22–29)
COLOR: YELLOW
CREAT SERPL-MCNC: 0.9 MG/DL (ref 0.5–1)
GFR AFRICAN AMERICAN: >60
GFR NON-AFRICAN AMERICAN: 59 ML/MIN/1.73
GLUCOSE BLD-MCNC: 112 MG/DL (ref 74–109)
GLUCOSE URINE: NEGATIVE MG/DL
HCT VFR BLD CALC: 28.1 % (ref 34–48)
HEMOGLOBIN: 8.9 G/DL (ref 11.5–15.5)
KETONES, URINE: NEGATIVE MG/DL
LEUKOCYTE ESTERASE, URINE: ABNORMAL
MCH RBC QN AUTO: 27.9 PG (ref 26–35)
MCHC RBC AUTO-ENTMCNC: 31.7 % (ref 32–34.5)
MCV RBC AUTO: 88.1 FL (ref 80–99.9)
NITRITE, URINE: NEGATIVE
PDW BLD-RTO: 13.5 FL (ref 11.5–15)
PH UA: 6 (ref 5–9)
PLATELET # BLD: 151 E9/L (ref 130–450)
PMV BLD AUTO: 11.2 FL (ref 7–12)
POTASSIUM SERPL-SCNC: 3.6 MMOL/L (ref 3.5–5)
PROTEIN UA: 30 MG/DL
RBC # BLD: 3.19 E12/L (ref 3.5–5.5)
RBC UA: ABNORMAL /HPF (ref 0–2)
SODIUM BLD-SCNC: 146 MMOL/L (ref 132–146)
SPECIFIC GRAVITY UA: 1.02 (ref 1–1.03)
UROBILINOGEN, URINE: 0.2 E.U./DL
WBC # BLD: 5.5 E9/L (ref 4.5–11.5)
WBC UA: ABNORMAL /HPF (ref 0–5)

## 2018-08-15 PROCEDURE — 85027 COMPLETE CBC AUTOMATED: CPT

## 2018-08-15 PROCEDURE — 80048 BASIC METABOLIC PNL TOTAL CA: CPT

## 2018-08-15 PROCEDURE — 87088 URINE BACTERIA CULTURE: CPT

## 2018-08-15 PROCEDURE — 81001 URINALYSIS AUTO W/SCOPE: CPT

## 2018-08-15 PROCEDURE — 87186 SC STD MICRODIL/AGAR DIL: CPT

## 2018-08-17 LAB
ORGANISM: ABNORMAL
URINE CULTURE, ROUTINE: ABNORMAL
URINE CULTURE, ROUTINE: ABNORMAL

## 2018-08-20 ENCOUNTER — HOSPITAL ENCOUNTER (OUTPATIENT)
Age: 83
Discharge: HOME | End: 2018-08-22
Payer: COMMERCIAL

## 2018-08-20 LAB
FERRITIN: 28 NG/ML
HCT VFR BLD CALC: 28.2 % (ref 34–48)
HEMOGLOBIN: 8.9 G/DL (ref 11.5–15.5)
IMMATURE RETIC FRACT: 9.4 % (ref 3–15.9)
IRON SATURATION: 24 % (ref 15–50)
IRON: 57 MCG/DL (ref 37–145)
RETIC HGB EQUIVALENT: 32.1 PG (ref 28.2–36.6)
RETICULOCYTE ABSOLUTE COUNT: 0.07 E12/L
RETICULOCYTE COUNT PCT: 2.1 % (ref 0.4–1.9)
TOTAL IRON BINDING CAPACITY: 233 MCG/DL (ref 250–450)

## 2018-08-20 PROCEDURE — 85018 HEMOGLOBIN: CPT

## 2018-08-20 PROCEDURE — 83550 IRON BINDING TEST: CPT

## 2018-08-20 PROCEDURE — 85045 AUTOMATED RETICULOCYTE COUNT: CPT

## 2018-08-20 PROCEDURE — 85014 HEMATOCRIT: CPT

## 2018-08-20 PROCEDURE — 83540 ASSAY OF IRON: CPT

## 2018-08-20 PROCEDURE — 36415 COLL VENOUS BLD VENIPUNCTURE: CPT

## 2018-08-20 PROCEDURE — 82728 ASSAY OF FERRITIN: CPT

## 2018-08-22 ENCOUNTER — HOSPITAL ENCOUNTER (OUTPATIENT)
Age: 83
Discharge: HOME | End: 2018-08-24
Payer: COMMERCIAL

## 2018-08-22 LAB — OCCULT BLOOD SCREENING: NORMAL

## 2018-08-22 PROCEDURE — G0328 FECAL BLOOD SCRN IMMUNOASSAY: HCPCS

## 2018-08-28 ENCOUNTER — HOSPITAL ENCOUNTER (OUTPATIENT)
Age: 83
Discharge: HOME | End: 2018-08-30
Payer: COMMERCIAL

## 2018-08-28 LAB
ALBUMIN SERPL-MCNC: 3.7 G/DL (ref 3.5–5.2)
ALP BLD-CCNC: 74 U/L (ref 35–104)
ALT SERPL-CCNC: 12 U/L (ref 0–32)
ANION GAP SERPL CALCULATED.3IONS-SCNC: 16 MMOL/L (ref 7–16)
AST SERPL-CCNC: 11 U/L (ref 0–31)
BILIRUB SERPL-MCNC: 0.3 MG/DL (ref 0–1.2)
BUN BLDV-MCNC: 21 MG/DL (ref 8–23)
CALCIUM SERPL-MCNC: 9.4 MG/DL (ref 8.6–10.2)
CHLORIDE BLD-SCNC: 103 MMOL/L (ref 98–107)
CO2: 24 MMOL/L (ref 22–29)
CREAT SERPL-MCNC: 1.1 MG/DL (ref 0.5–1)
FOLATE: 8 NG/ML (ref 4.8–24.2)
GFR AFRICAN AMERICAN: 57
GFR NON-AFRICAN AMERICAN: 47 ML/MIN/1.73
GLUCOSE BLD-MCNC: 100 MG/DL (ref 74–109)
HCT VFR BLD CALC: 29.2 % (ref 34–48)
HEMOGLOBIN: 9.3 G/DL (ref 11.5–15.5)
MCH RBC QN AUTO: 28.4 PG (ref 26–35)
MCHC RBC AUTO-ENTMCNC: 31.8 % (ref 32–34.5)
MCV RBC AUTO: 89.3 FL (ref 80–99.9)
PDW BLD-RTO: 13.4 FL (ref 11.5–15)
PLATELET # BLD: 139 E9/L (ref 130–450)
PMV BLD AUTO: 11.2 FL (ref 7–12)
POTASSIUM SERPL-SCNC: 3.7 MMOL/L (ref 3.5–5)
RBC # BLD: 3.27 E12/L (ref 3.5–5.5)
SODIUM BLD-SCNC: 143 MMOL/L (ref 132–146)
TOTAL PROTEIN: 6 G/DL (ref 6.4–8.3)
TSH SERPL DL<=0.05 MIU/L-ACNC: 3.28 UIU/ML (ref 0.27–4.2)
VITAMIN B-12: 1251 PG/ML (ref 211–946)
WBC # BLD: 4.8 E9/L (ref 4.5–11.5)

## 2018-08-28 PROCEDURE — 84443 ASSAY THYROID STIM HORMONE: CPT

## 2018-08-28 PROCEDURE — 80053 COMPREHEN METABOLIC PANEL: CPT

## 2018-08-28 PROCEDURE — 36415 COLL VENOUS BLD VENIPUNCTURE: CPT

## 2018-08-28 PROCEDURE — 85027 COMPLETE CBC AUTOMATED: CPT

## 2018-08-28 PROCEDURE — 82746 ASSAY OF FOLIC ACID SERUM: CPT

## 2018-08-28 PROCEDURE — 82607 VITAMIN B-12: CPT

## 2018-09-04 ENCOUNTER — HOSPITAL ENCOUNTER (EMERGENCY)
Age: 83
Discharge: NURSING, REHAB OR LONG-TERM CARE | End: 2018-09-05
Attending: EMERGENCY MEDICINE
Payer: COMMERCIAL

## 2018-09-04 ENCOUNTER — APPOINTMENT (OUTPATIENT)
Dept: GENERAL RADIOLOGY | Age: 83
End: 2018-09-04
Payer: COMMERCIAL

## 2018-09-04 VITALS
RESPIRATION RATE: 18 BRPM | DIASTOLIC BLOOD PRESSURE: 68 MMHG | OXYGEN SATURATION: 96 % | HEART RATE: 88 BPM | BODY MASS INDEX: 29.29 KG/M2 | WEIGHT: 150 LBS | SYSTOLIC BLOOD PRESSURE: 118 MMHG | TEMPERATURE: 98.4 F

## 2018-09-04 DIAGNOSIS — F22 PARANOID (HCC): Primary | ICD-10-CM

## 2018-09-04 DIAGNOSIS — F03.91 DEMENTIA WITH BEHAVIORAL DISTURBANCE, UNSPECIFIED DEMENTIA TYPE: ICD-10-CM

## 2018-09-04 LAB
ACETAMINOPHEN LEVEL: <5 MCG/ML (ref 10–30)
ALBUMIN SERPL-MCNC: 3.9 G/DL (ref 3.5–5.2)
ALP BLD-CCNC: 73 U/L (ref 35–104)
ALT SERPL-CCNC: 10 U/L (ref 0–32)
ANION GAP SERPL CALCULATED.3IONS-SCNC: 13 MMOL/L (ref 7–16)
AST SERPL-CCNC: 13 U/L (ref 0–31)
BASOPHILS ABSOLUTE: 0.02 E9/L (ref 0–0.2)
BASOPHILS RELATIVE PERCENT: 0.3 % (ref 0–2)
BILIRUB SERPL-MCNC: 0.3 MG/DL (ref 0–1.2)
BUN BLDV-MCNC: 25 MG/DL (ref 8–23)
CALCIUM SERPL-MCNC: 10 MG/DL (ref 8.6–10.2)
CHLORIDE BLD-SCNC: 105 MMOL/L (ref 98–107)
CO2: 23 MMOL/L (ref 22–29)
CREAT SERPL-MCNC: 1.1 MG/DL (ref 0.5–1)
EOSINOPHILS ABSOLUTE: 0.09 E9/L (ref 0.05–0.5)
EOSINOPHILS RELATIVE PERCENT: 1.2 % (ref 0–6)
ETHANOL: <10 MG/DL (ref 0–0.08)
GFR AFRICAN AMERICAN: 57
GFR NON-AFRICAN AMERICAN: 47 ML/MIN/1.73
GLUCOSE BLD-MCNC: 122 MG/DL (ref 74–109)
HCT VFR BLD CALC: 29.6 % (ref 34–48)
HEMOGLOBIN: 9.7 G/DL (ref 11.5–15.5)
IMMATURE GRANULOCYTES #: 0.02 E9/L
IMMATURE GRANULOCYTES %: 0.3 % (ref 0–5)
LYMPHOCYTES ABSOLUTE: 1.09 E9/L (ref 1.5–4)
LYMPHOCYTES RELATIVE PERCENT: 14.8 % (ref 20–42)
MCH RBC QN AUTO: 28.9 PG (ref 26–35)
MCHC RBC AUTO-ENTMCNC: 32.8 % (ref 32–34.5)
MCV RBC AUTO: 88.1 FL (ref 80–99.9)
MONOCYTES ABSOLUTE: 0.52 E9/L (ref 0.1–0.95)
MONOCYTES RELATIVE PERCENT: 7.1 % (ref 2–12)
NEUTROPHILS ABSOLUTE: 5.62 E9/L (ref 1.8–7.3)
NEUTROPHILS RELATIVE PERCENT: 76.3 % (ref 43–80)
PDW BLD-RTO: 13.3 FL (ref 11.5–15)
PLATELET # BLD: 172 E9/L (ref 130–450)
PMV BLD AUTO: 10.4 FL (ref 7–12)
POTASSIUM SERPL-SCNC: 3.5 MMOL/L (ref 3.5–5)
RBC # BLD: 3.36 E12/L (ref 3.5–5.5)
SALICYLATE, SERUM: <0.3 MG/DL (ref 0–30)
SODIUM BLD-SCNC: 141 MMOL/L (ref 132–146)
TOTAL PROTEIN: 6.6 G/DL (ref 6.4–8.3)
TRICYCLIC ANTIDEPRESSANTS SCREEN SERUM: NEGATIVE NG/ML
TROPONIN: 0.01 NG/ML (ref 0–0.03)
WBC # BLD: 7.4 E9/L (ref 4.5–11.5)

## 2018-09-04 PROCEDURE — 99285 EMERGENCY DEPT VISIT HI MDM: CPT

## 2018-09-04 PROCEDURE — 80307 DRUG TEST PRSMV CHEM ANLYZR: CPT

## 2018-09-04 PROCEDURE — 84484 ASSAY OF TROPONIN QUANT: CPT

## 2018-09-04 PROCEDURE — 81001 URINALYSIS AUTO W/SCOPE: CPT

## 2018-09-04 PROCEDURE — G0480 DRUG TEST DEF 1-7 CLASSES: HCPCS

## 2018-09-04 PROCEDURE — 71045 X-RAY EXAM CHEST 1 VIEW: CPT

## 2018-09-04 PROCEDURE — 85025 COMPLETE CBC W/AUTO DIFF WBC: CPT

## 2018-09-04 PROCEDURE — 80053 COMPREHEN METABOLIC PANEL: CPT

## 2018-09-05 LAB
AMPHETAMINE SCREEN, URINE: NOT DETECTED
BACTERIA: ABNORMAL /HPF
BARBITURATE SCREEN URINE: NOT DETECTED
BENZODIAZEPINE SCREEN, URINE: NOT DETECTED
BILIRUBIN URINE: NEGATIVE
BLOOD, URINE: NEGATIVE
CANNABINOID SCREEN URINE: NOT DETECTED
CLARITY: CLEAR
COCAINE METABOLITE SCREEN URINE: NOT DETECTED
COLOR: YELLOW
EPITHELIAL CELLS, UA: ABNORMAL /HPF
GLUCOSE URINE: NEGATIVE MG/DL
KETONES, URINE: NEGATIVE MG/DL
LEUKOCYTE ESTERASE, URINE: ABNORMAL
METHADONE SCREEN, URINE: NOT DETECTED
NITRITE, URINE: NEGATIVE
OPIATE SCREEN URINE: NOT DETECTED
PH UA: 6 (ref 5–9)
PHENCYCLIDINE SCREEN URINE: NOT DETECTED
PROPOXYPHENE SCREEN: NOT DETECTED
PROTEIN UA: NEGATIVE MG/DL
RBC UA: ABNORMAL /HPF (ref 0–2)
SPECIFIC GRAVITY UA: 1.02 (ref 1–1.03)
UROBILINOGEN, URINE: 0.2 E.U./DL
WBC UA: ABNORMAL /HPF (ref 0–5)

## 2018-09-05 RX ORDER — DONEPEZIL HYDROCHLORIDE 5 MG/1
10 TABLET, FILM COATED ORAL NIGHTLY
COMMUNITY
End: 2019-01-01

## 2018-09-05 RX ORDER — BUSPIRONE HYDROCHLORIDE 10 MG/1
10 TABLET ORAL 3 TIMES DAILY
COMMUNITY

## 2018-09-05 NOTE — ED NOTES
Pt report called to Ana Lilia at [facility].  Will arrange tx     Miller Rosa RN  09/05/18 [id number]

## 2018-09-05 NOTE — ED NOTES
Monster Sebastian from [facility] about pt return.  Will arrange tx again     Catherine Laguna RN  09/05/18 [id number]

## 2018-09-05 NOTE — ED NOTES
Per Dr Amita Ballard, pt does not meet criteria for admission.  Admission denied     Karl Bravo RN  09/05/18 [id number]

## 2018-09-05 NOTE — ED NOTES
Pt picked up by [facility] to transport back to [facility]. Pt assisted with getting dressed prior to transport. Pt returned to nursing home via ambulance in stable condition with all belongings.       Venice Cantor RN  09/05/18 [id number]

## 2018-09-05 NOTE — ED NOTES
David Abebe was brought in by EMS from [address] [place] [address] for having AH x2 weeks and family is frustrated. Per report, pt has increased paranoia and is refusing to take her meds as well as not being able to recognize her son. Pt has hx of Alzheimers dementia. Pt had a CVA in July with some left sided weakness. Pt denies SI, HI, and AVH. Pt asked multiple times when she could go home to see her family. Pt alert to self only. Pt was unable to void so a fuentes was placed in order to obtain a sample for testing. Pt was upset over this stating she just had one taken out. Pt had been placed on a bedpan for several minutes. Pt c/o back pain from the pan, so the cath was used. Pt drained 700cc urine. Pts inner thighs are reddened. Multiple bruises on rt hand/arm. All labs sent and resulted .  Will speak to Dr Marc Geronimo for disposition     lFaquita Hernandez RN  09/05/18 [address] RN  09/05/18 0327

## 2018-09-05 NOTE — ED PROVIDER NOTES
5. 62 1.80 - 7.30 E9/L    Immature Granulocytes # 0.02 E9/L    Lymphocytes # 1.09 (L) 1.50 - 4.00 E9/L    Monocytes # 0.52 0.10 - 0.95 E9/L    Eosinophils # 0.09 0.05 - 0.50 E9/L    Basophils # 0.02 0.00 - 0.20 E9/L   Comprehensive Metabolic Panel   Result Value Ref Range    Sodium 141 132 - 146 mmol/L    Potassium 3.5 3.5 - 5.0 mmol/L    Chloride 105 98 - 107 mmol/L    CO2 23 22 - 29 mmol/L    Anion Gap 13 7 - 16 mmol/L    Glucose 122 (H) 74 - 109 mg/dL    BUN 25 (H) 8 - 23 mg/dL    CREATININE 1.1 (H) 0.5 - 1.0 mg/dL    GFR Non-African American 47 >=60 mL/min/1.73    GFR African American 57     Calcium 10.0 8.6 - 10.2 mg/dL    Total Protein 6.6 6.4 - 8.3 g/dL    Alb 3.9 3.5 - 5.2 g/dL    Total Bilirubin 0.3 0.0 - 1.2 mg/dL    Alkaline Phosphatase 73 35 - 104 U/L    ALT 10 0 - 32 U/L    AST 13 0 - 31 U/L   Urine Drug Screen   Result Value Ref Range    Amphetamine Screen, Urine NOT DETECTED Negative <1000 ng/mL    Barbiturate Screen, Ur NOT DETECTED Negative < 200 ng/mL    Benzodiazepine Screen, Urine NOT DETECTED Negative < 200 ng/mL    Cannabinoid Scrn, Ur NOT DETECTED Negative < 50ng/mL    Cocaine Metabolite Screen, Urine NOT DETECTED Negative < 300 ng/mL    Opiate Scrn, Ur NOT DETECTED Negative < 300ng/mL    PCP Screen, Urine NOT DETECTED Negative < 25 ng/mL    Methadone Screen, Urine NOT DETECTED Negative <300 ng/mL    Propoxyphene Scrn, Ur NOT DETECTED Negative <300 ng/mL   Urinalysis   Result Value Ref Range    Color, UA Yellow Straw/Yellow    Clarity, UA Clear Clear    Glucose, Ur Negative Negative mg/dL    Bilirubin Urine Negative Negative    Ketones, Urine Negative Negative mg/dL    Specific Gravity, UA 1.020 1.005 - 1.030    Blood, Urine Negative Negative    pH, UA 6.0 5.0 - 9.0    Protein, UA Negative Negative mg/dL    Urobilinogen, Urine 0.2 <2.0 E.U./dL    Nitrite, Urine Negative Negative    Leukocyte Esterase, Urine LARGE (A) Negative   Microscopic Urinalysis   Result Value Ref Range    WBC, UA 2-5 0 - 5 /HPF    RBC, UA 0-1 0 - 2 /HPF    Epi Cells FEW /HPF    Bacteria, UA FEW (A) /HPF       RADIOLOGY:  Interpreted by Radiologist.  XR CHEST PORTABLE   Final Result   Expiratory study or hypoventilation of the lower lung   bases. There are no acute cardiopulmonary process observed.          ------------------------- NURSING NOTES AND VITALS REVIEWED ---------------------------   The nursing notes within the ED encounter and vital signs as below have been reviewed by myself. /68   Pulse 88   Temp 98.4 °F (36.9 °C)   Resp 18   Wt 150 lb (68 kg)   SpO2 96%   BMI 29.29 kg/m²   Oxygen Saturation Interpretation: Normal    The patients available past medical records and past encounters were reviewed. ------------------------------ ED COURSE/MEDICAL DECISION MAKING----------------------  Medications - No data to display    Medical Decision Making:    Patient arrived in the ED for psychiatric evaluation. She has dementia. Patient was threatening self and staff at nursing home. Patient has no complaints at this time and does not remember this. XR of the chest and lab work ordered. Patient has reassuring workup. She has been pleasant here. No internal stimulation noted in ED. Do not feel patient is a danger to herself or others. Will discharge back to facility. Re-Evaluations:           Re-evaluation. Patients symptoms are improving    Consultations:             Social Work    Counseling: The emergency provider has spoken with the patient and discussed todays results, in addition to providing specific details for the plan of care and counseling regarding the diagnosis and prognosis. Questions are answered at this time and they are agreeable with the plan.     --------------------------------- IMPRESSION AND DISPOSITION ---------------------------------    IMPRESSION  1. Paranoid ([address]    2.  Dementia with behavioral disturbance, unspecified dementia type        DISPOSITION  Disposition: as per

## 2018-09-05 NOTE — ED NOTES
Per Dr Maddy Santizo, pt's Dr at [address] [place] [address] refused to take pt back and wants her admitted.  Will call Dr Aramis Martinez, RN  09/05/18 [id number]

## 2018-09-11 ENCOUNTER — HOSPITAL ENCOUNTER (OUTPATIENT)
Age: 83
Discharge: HOME | End: 2018-09-13
Payer: COMMERCIAL

## 2018-09-11 PROCEDURE — G0328 FECAL BLOOD SCRN IMMUNOASSAY: HCPCS

## 2018-09-12 LAB — OCCULT BLOOD SCREENING: NORMAL

## 2018-10-01 ENCOUNTER — HOSPITAL ENCOUNTER (OUTPATIENT)
Age: 83
Discharge: HOME | End: 2018-10-03
Payer: COMMERCIAL

## 2018-10-01 LAB
ALBUMIN SERPL-MCNC: 3.6 G/DL (ref 3.5–5.2)
ALP BLD-CCNC: 59 U/L (ref 35–104)
ALT SERPL-CCNC: 8 U/L (ref 0–32)
ANION GAP SERPL CALCULATED.3IONS-SCNC: 12 MMOL/L (ref 7–16)
AST SERPL-CCNC: 7 U/L (ref 0–31)
BILIRUB SERPL-MCNC: <0.2 MG/DL (ref 0–1.2)
BUN BLDV-MCNC: 41 MG/DL (ref 8–23)
CALCIUM SERPL-MCNC: 9.3 MG/DL (ref 8.6–10.2)
CHLORIDE BLD-SCNC: 109 MMOL/L (ref 98–107)
CHOLESTEROL, TOTAL: 114 MG/DL (ref 0–199)
CO2: 21 MMOL/L (ref 22–29)
CREAT SERPL-MCNC: 1.5 MG/DL (ref 0.5–1)
GFR AFRICAN AMERICAN: 40
GFR NON-AFRICAN AMERICAN: 33 ML/MIN/1.73
GLUCOSE BLD-MCNC: 89 MG/DL (ref 74–109)
HBA1C MFR BLD: 5.6 % (ref 4–5.6)
HCT VFR BLD CALC: 25.4 % (ref 34–48)
HDLC SERPL-MCNC: 43 MG/DL
HEMOGLOBIN: 8 G/DL (ref 11.5–15.5)
LDL CHOLESTEROL CALCULATED: 46 MG/DL (ref 0–99)
MCH RBC QN AUTO: 28.8 PG (ref 26–35)
MCHC RBC AUTO-ENTMCNC: 31.5 % (ref 32–34.5)
MCV RBC AUTO: 91.4 FL (ref 80–99.9)
PDW BLD-RTO: 13.3 FL (ref 11.5–15)
PLATELET # BLD: 125 E9/L (ref 130–450)
PMV BLD AUTO: 11.3 FL (ref 7–12)
POTASSIUM SERPL-SCNC: 4.5 MMOL/L (ref 3.5–5)
RBC # BLD: 2.78 E12/L (ref 3.5–5.5)
SODIUM BLD-SCNC: 142 MMOL/L (ref 132–146)
TOTAL PROTEIN: 5.6 G/DL (ref 6.4–8.3)
TRIGL SERPL-MCNC: 123 MG/DL (ref 0–149)
TSH SERPL DL<=0.05 MIU/L-ACNC: 3.79 UIU/ML (ref 0.27–4.2)
VLDLC SERPL CALC-MCNC: 25 MG/DL
WBC # BLD: 4.1 E9/L (ref 4.5–11.5)

## 2018-10-01 PROCEDURE — 36415 COLL VENOUS BLD VENIPUNCTURE: CPT

## 2018-10-01 PROCEDURE — 80061 LIPID PANEL: CPT

## 2018-10-01 PROCEDURE — 83036 HEMOGLOBIN GLYCOSYLATED A1C: CPT

## 2018-10-01 PROCEDURE — 85027 COMPLETE CBC AUTOMATED: CPT

## 2018-10-01 PROCEDURE — 80053 COMPREHEN METABOLIC PANEL: CPT

## 2018-10-01 PROCEDURE — 84443 ASSAY THYROID STIM HORMONE: CPT

## 2018-12-09 ENCOUNTER — HOSPITAL ENCOUNTER (OUTPATIENT)
Age: 83
Discharge: HOME | End: 2018-12-11
Payer: COMMERCIAL

## 2018-12-09 LAB
AMORPHOUS: ABNORMAL
BACTERIA: ABNORMAL /HPF
BILIRUBIN URINE: NEGATIVE
BLOOD, URINE: ABNORMAL
CLARITY: ABNORMAL
COLOR: YELLOW
CRYSTALS, UA: ABNORMAL
EPITHELIAL CELLS, UA: ABNORMAL /HPF
GLUCOSE URINE: NEGATIVE MG/DL
HCT VFR BLD CALC: 26 % (ref 34–48)
HEMOGLOBIN: 8.3 G/DL (ref 11.5–15.5)
KETONES, URINE: NEGATIVE MG/DL
LEUKOCYTE ESTERASE, URINE: ABNORMAL
MCH RBC QN AUTO: 27.8 PG (ref 26–35)
MCHC RBC AUTO-ENTMCNC: 31.9 % (ref 32–34.5)
MCV RBC AUTO: 87 FL (ref 80–99.9)
NITRITE, URINE: POSITIVE
PDW BLD-RTO: 13.2 FL (ref 11.5–15)
PH UA: 5.5 (ref 5–9)
PLATELET # BLD: 118 E9/L (ref 130–450)
PMV BLD AUTO: 11.1 FL (ref 7–12)
PROTEIN UA: 30 MG/DL
RBC # BLD: 2.99 E12/L (ref 3.5–5.5)
RBC UA: ABNORMAL /HPF (ref 0–2)
SPECIFIC GRAVITY UA: >=1.03 (ref 1–1.03)
UROBILINOGEN, URINE: 0.2 E.U./DL
WBC # BLD: 7 E9/L (ref 4.5–11.5)
WBC UA: >20 /HPF (ref 0–5)

## 2018-12-09 PROCEDURE — 36415 COLL VENOUS BLD VENIPUNCTURE: CPT

## 2018-12-09 PROCEDURE — 81001 URINALYSIS AUTO W/SCOPE: CPT

## 2018-12-09 PROCEDURE — 87088 URINE BACTERIA CULTURE: CPT

## 2018-12-09 PROCEDURE — 87077 CULTURE AEROBIC IDENTIFY: CPT

## 2018-12-09 PROCEDURE — 85027 COMPLETE CBC AUTOMATED: CPT

## 2018-12-09 PROCEDURE — 87186 SC STD MICRODIL/AGAR DIL: CPT

## 2018-12-12 LAB
ORGANISM: ABNORMAL
URINE CULTURE, ROUTINE: ABNORMAL
URINE CULTURE, ROUTINE: ABNORMAL

## 2019-01-01 ENCOUNTER — HOSPITAL ENCOUNTER (OUTPATIENT)
Age: 84
Discharge: HOME | End: 2019-05-18
Payer: COMMERCIAL

## 2019-01-01 ENCOUNTER — ANESTHESIA EVENT (OUTPATIENT)
Dept: OPERATING ROOM | Age: 84
End: 2019-01-01
Payer: COMMERCIAL

## 2019-01-01 ENCOUNTER — HOSPITAL ENCOUNTER (OUTPATIENT)
Age: 84
Discharge: HOME | End: 2019-02-09
Payer: COMMERCIAL

## 2019-01-01 ENCOUNTER — HOSPITAL ENCOUNTER (OUTPATIENT)
Age: 84
Discharge: HOME | End: 2019-08-17
Payer: COMMERCIAL

## 2019-01-01 ENCOUNTER — HOSPITAL ENCOUNTER (INPATIENT)
Age: 84
LOS: 3 days | Discharge: NURSING, REHAB OR LONG-TERM CARE | DRG: 699 | End: 2019-05-26
Attending: EMERGENCY MEDICINE | Admitting: INTERNAL MEDICINE
Payer: COMMERCIAL

## 2019-01-01 ENCOUNTER — HOSPITAL ENCOUNTER (OUTPATIENT)
Age: 84
Discharge: HOME | End: 2019-01-26
Payer: COMMERCIAL

## 2019-01-01 ENCOUNTER — HOSPITAL ENCOUNTER (OUTPATIENT)
Age: 84
Discharge: HOME | End: 2019-05-23
Payer: COMMERCIAL

## 2019-01-01 ENCOUNTER — HOSPITAL ENCOUNTER (OUTPATIENT)
Age: 84
Discharge: HOME | End: 2019-12-17
Payer: COMMERCIAL

## 2019-01-01 ENCOUNTER — ANESTHESIA (OUTPATIENT)
Dept: OPERATING ROOM | Age: 84
End: 2019-01-01
Payer: COMMERCIAL

## 2019-01-01 ENCOUNTER — HOSPITAL ENCOUNTER (OUTPATIENT)
Age: 84
Discharge: HOME | End: 2019-06-01
Payer: COMMERCIAL

## 2019-01-01 ENCOUNTER — HOSPITAL ENCOUNTER (OUTPATIENT)
Age: 84
Discharge: HOME | End: 2019-01-25
Payer: COMMERCIAL

## 2019-01-01 ENCOUNTER — HOSPITAL ENCOUNTER (OUTPATIENT)
Age: 84
Discharge: HOME | End: 2019-02-16
Payer: COMMERCIAL

## 2019-01-01 ENCOUNTER — HOSPITAL ENCOUNTER (OUTPATIENT)
Age: 84
Discharge: HOME | End: 2019-10-04
Payer: COMMERCIAL

## 2019-01-01 ENCOUNTER — HOSPITAL ENCOUNTER (OUTPATIENT)
Age: 84
Discharge: HOME | End: 2019-02-23
Payer: COMMERCIAL

## 2019-01-01 ENCOUNTER — HOSPITAL ENCOUNTER (OUTPATIENT)
Age: 84
Discharge: HOME | End: 2019-01-27
Payer: COMMERCIAL

## 2019-01-01 ENCOUNTER — HOSPITAL ENCOUNTER (OUTPATIENT)
Age: 84
Discharge: HOME | End: 2019-04-03
Payer: COMMERCIAL

## 2019-01-01 ENCOUNTER — HOSPITAL ENCOUNTER (OUTPATIENT)
Age: 84
Discharge: HOME | End: 2019-09-21
Payer: COMMERCIAL

## 2019-01-01 ENCOUNTER — APPOINTMENT (OUTPATIENT)
Dept: GENERAL RADIOLOGY | Age: 84
DRG: 699 | End: 2019-01-01
Payer: COMMERCIAL

## 2019-01-01 ENCOUNTER — HOSPITAL ENCOUNTER (OUTPATIENT)
Age: 84
Discharge: HOME | End: 2019-07-20
Payer: COMMERCIAL

## 2019-01-01 ENCOUNTER — HOSPITAL ENCOUNTER (OUTPATIENT)
Age: 84
Setting detail: OUTPATIENT SURGERY
Discharge: NURSING, REHAB OR LONG-TERM CARE | End: 2019-03-15
Attending: UROLOGY | Admitting: UROLOGY
Payer: COMMERCIAL

## 2019-01-01 ENCOUNTER — HOSPITAL ENCOUNTER (OUTPATIENT)
Age: 84
Discharge: HOME | End: 2019-04-20
Payer: COMMERCIAL

## 2019-01-01 ENCOUNTER — HOSPITAL ENCOUNTER (OUTPATIENT)
Age: 84
Discharge: HOME | End: 2019-08-02
Payer: COMMERCIAL

## 2019-01-01 ENCOUNTER — HOSPITAL ENCOUNTER (OUTPATIENT)
Age: 84
Discharge: HOME | End: 2019-11-23
Payer: COMMERCIAL

## 2019-01-01 ENCOUNTER — HOSPITAL ENCOUNTER (OUTPATIENT)
Age: 84
Discharge: HOME | End: 2019-08-07
Payer: COMMERCIAL

## 2019-01-01 ENCOUNTER — HOSPITAL ENCOUNTER (OUTPATIENT)
Age: 84
Discharge: HOME | End: 2019-05-20
Payer: COMMERCIAL

## 2019-01-01 ENCOUNTER — HOSPITAL ENCOUNTER (OUTPATIENT)
Age: 84
Discharge: HOME | End: 2019-12-21
Payer: COMMERCIAL

## 2019-01-01 ENCOUNTER — HOSPITAL ENCOUNTER (OUTPATIENT)
Age: 84
Discharge: HOME | End: 2019-06-22
Payer: COMMERCIAL

## 2019-01-01 ENCOUNTER — HOSPITAL ENCOUNTER (OUTPATIENT)
Age: 84
Discharge: HOME | End: 2019-10-19
Payer: COMMERCIAL

## 2019-01-01 ENCOUNTER — HOSPITAL ENCOUNTER (OUTPATIENT)
Age: 84
Discharge: HOME | End: 2019-03-23

## 2019-01-01 VITALS
SYSTOLIC BLOOD PRESSURE: 148 MMHG | OXYGEN SATURATION: 97 % | HEIGHT: 60 IN | BODY MASS INDEX: 32 KG/M2 | TEMPERATURE: 98.7 F | WEIGHT: 163 LBS | DIASTOLIC BLOOD PRESSURE: 68 MMHG | HEART RATE: 62 BPM | RESPIRATION RATE: 18 BRPM

## 2019-01-01 VITALS
SYSTOLIC BLOOD PRESSURE: 151 MMHG | HEART RATE: 50 BPM | DIASTOLIC BLOOD PRESSURE: 70 MMHG | HEIGHT: 60 IN | BODY MASS INDEX: 29.84 KG/M2 | OXYGEN SATURATION: 96 % | RESPIRATION RATE: 20 BRPM | TEMPERATURE: 98.1 F | WEIGHT: 152 LBS

## 2019-01-01 VITALS — SYSTOLIC BLOOD PRESSURE: 93 MMHG | DIASTOLIC BLOOD PRESSURE: 44 MMHG | OXYGEN SATURATION: 100 %

## 2019-01-01 DIAGNOSIS — N39.0 URINARY TRACT INFECTION ASSOCIATED WITH CATHETERIZATION OF URINARY TRACT, UNSPECIFIED INDWELLING URINARY CATHETER TYPE, INITIAL ENCOUNTER (HCC): Primary | ICD-10-CM

## 2019-01-01 DIAGNOSIS — T83.511A URINARY TRACT INFECTION ASSOCIATED WITH CATHETERIZATION OF URINARY TRACT, UNSPECIFIED INDWELLING URINARY CATHETER TYPE, INITIAL ENCOUNTER (HCC): Primary | ICD-10-CM

## 2019-01-01 LAB
ALBUMIN SERPL-MCNC: 3 G/DL (ref 3.5–5.2)
ALBUMIN SERPL-MCNC: 3.1 G/DL (ref 3.5–5.2)
ALBUMIN SERPL-MCNC: 3.3 G/DL (ref 3.5–5.2)
ALBUMIN SERPL-MCNC: 3.3 G/DL (ref 3.5–5.2)
ALBUMIN SERPL-MCNC: 3.4 G/DL (ref 3.5–5.2)
ALBUMIN SERPL-MCNC: 3.6 G/DL (ref 3.5–5.2)
ALP BLD-CCNC: 56 U/L (ref 35–104)
ALP BLD-CCNC: 59 U/L (ref 35–104)
ALP BLD-CCNC: 62 U/L (ref 35–104)
ALP BLD-CCNC: 63 U/L (ref 35–104)
ALP BLD-CCNC: 68 U/L (ref 35–104)
ALP BLD-CCNC: 75 U/L (ref 35–104)
ALT SERPL-CCNC: 10 U/L (ref 0–32)
ALT SERPL-CCNC: 12 U/L (ref 0–32)
ALT SERPL-CCNC: 14 U/L (ref 0–32)
ALT SERPL-CCNC: 14 U/L (ref 0–32)
ALT SERPL-CCNC: 7 U/L (ref 0–32)
ALT SERPL-CCNC: 9 U/L (ref 0–32)
ANION GAP SERPL CALCULATED.3IONS-SCNC: 10 MMOL/L (ref 7–16)
ANION GAP SERPL CALCULATED.3IONS-SCNC: 10 MMOL/L (ref 7–16)
ANION GAP SERPL CALCULATED.3IONS-SCNC: 11 MMOL/L (ref 7–16)
ANION GAP SERPL CALCULATED.3IONS-SCNC: 12 MMOL/L (ref 7–16)
ANION GAP SERPL CALCULATED.3IONS-SCNC: 12 MMOL/L (ref 7–16)
ANION GAP SERPL CALCULATED.3IONS-SCNC: 13 MMOL/L (ref 7–16)
ANION GAP SERPL CALCULATED.3IONS-SCNC: 14 MMOL/L (ref 7–16)
ANISOCYTOSIS: ABNORMAL
AST SERPL-CCNC: 10 U/L (ref 0–31)
AST SERPL-CCNC: 10 U/L (ref 0–31)
AST SERPL-CCNC: 11 U/L (ref 0–31)
AST SERPL-CCNC: 11 U/L (ref 0–31)
AST SERPL-CCNC: 8 U/L (ref 0–31)
AST SERPL-CCNC: 9 U/L (ref 0–31)
BACTERIA: ABNORMAL /HPF
BASOPHILS ABSOLUTE: 0.01 E9/L (ref 0–0.2)
BASOPHILS ABSOLUTE: 0.01 E9/L (ref 0–0.2)
BASOPHILS ABSOLUTE: 0.02 E9/L (ref 0–0.2)
BASOPHILS ABSOLUTE: 0.02 E9/L (ref 0–0.2)
BASOPHILS RELATIVE PERCENT: 0.1 % (ref 0–2)
BASOPHILS RELATIVE PERCENT: 0.3 % (ref 0–2)
BASOPHILS RELATIVE PERCENT: 0.3 % (ref 0–2)
BASOPHILS RELATIVE PERCENT: 0.4 % (ref 0–2)
BILIRUB SERPL-MCNC: 0.2 MG/DL (ref 0–1.2)
BILIRUB SERPL-MCNC: 0.3 MG/DL (ref 0–1.2)
BILIRUB SERPL-MCNC: 0.5 MG/DL (ref 0–1.2)
BILIRUB SERPL-MCNC: <0.2 MG/DL (ref 0–1.2)
BILIRUBIN DIRECT: <0.2 MG/DL (ref 0–0.3)
BILIRUBIN URINE: ABNORMAL
BILIRUBIN URINE: NEGATIVE
BILIRUBIN, INDIRECT: ABNORMAL MG/DL (ref 0–1)
BLOOD CULTURE, ROUTINE: NORMAL
BLOOD, URINE: ABNORMAL
BUN BLDV-MCNC: 12 MG/DL (ref 8–23)
BUN BLDV-MCNC: 13 MG/DL (ref 8–23)
BUN BLDV-MCNC: 14 MG/DL (ref 8–23)
BUN BLDV-MCNC: 15 MG/DL (ref 8–23)
BUN BLDV-MCNC: 17 MG/DL (ref 8–23)
BUN BLDV-MCNC: 17 MG/DL (ref 8–23)
BUN BLDV-MCNC: 23 MG/DL (ref 8–23)
CALCIUM SERPL-MCNC: 8.4 MG/DL (ref 8.6–10.2)
CALCIUM SERPL-MCNC: 8.7 MG/DL (ref 8.6–10.2)
CALCIUM SERPL-MCNC: 8.8 MG/DL (ref 8.6–10.2)
CALCIUM SERPL-MCNC: 8.9 MG/DL (ref 8.6–10.2)
CALCIUM SERPL-MCNC: 9 MG/DL (ref 8.6–10.2)
CALCIUM SERPL-MCNC: 9.1 MG/DL (ref 8.6–10.2)
CALCIUM SERPL-MCNC: 9.1 MG/DL (ref 8.6–10.2)
CASTS: ABNORMAL /LPF
CHLORIDE BLD-SCNC: 101 MMOL/L (ref 98–107)
CHLORIDE BLD-SCNC: 104 MMOL/L (ref 98–107)
CHLORIDE BLD-SCNC: 105 MMOL/L (ref 98–107)
CHLORIDE BLD-SCNC: 105 MMOL/L (ref 98–107)
CHLORIDE BLD-SCNC: 106 MMOL/L (ref 98–107)
CHLORIDE BLD-SCNC: 108 MMOL/L (ref 98–107)
CHLORIDE BLD-SCNC: 99 MMOL/L (ref 98–107)
CHOLESTEROL, TOTAL: 100 MG/DL (ref 0–199)
CHOLESTEROL, TOTAL: 104 MG/DL (ref 0–199)
CHOLESTEROL, TOTAL: 113 MG/DL (ref 0–199)
CLARITY: ABNORMAL
CLARITY: CLEAR
CO2: 22 MMOL/L (ref 22–29)
CO2: 23 MMOL/L (ref 22–29)
CO2: 24 MMOL/L (ref 22–29)
CO2: 24 MMOL/L (ref 22–29)
CO2: 26 MMOL/L (ref 22–29)
CO2: 26 MMOL/L (ref 22–29)
CO2: 28 MMOL/L (ref 22–29)
COLOR: ABNORMAL
COLOR: YELLOW
CREAT SERPL-MCNC: 1 MG/DL (ref 0.5–1)
CREAT SERPL-MCNC: 1.1 MG/DL (ref 0.5–1)
CREAT SERPL-MCNC: 1.2 MG/DL (ref 0.5–1)
CREAT SERPL-MCNC: 1.2 MG/DL (ref 0.5–1)
CULTURE, BLOOD 2: NORMAL
EKG ATRIAL RATE: 60 BPM
EKG P AXIS: 30 DEGREES
EKG P-R INTERVAL: 174 MS
EKG Q-T INTERVAL: 464 MS
EKG QRS DURATION: 138 MS
EKG QTC CALCULATION (BAZETT): 464 MS
EKG T AXIS: 150 DEGREES
EKG VENTRICULAR RATE: 60 BPM
EOSINOPHILS ABSOLUTE: 0.18 E9/L (ref 0.05–0.5)
EOSINOPHILS ABSOLUTE: 0.19 E9/L (ref 0.05–0.5)
EOSINOPHILS ABSOLUTE: 0.21 E9/L (ref 0.05–0.5)
EOSINOPHILS ABSOLUTE: 0.22 E9/L (ref 0.05–0.5)
EOSINOPHILS RELATIVE PERCENT: 2.8 % (ref 0–6)
EOSINOPHILS RELATIVE PERCENT: 3.2 % (ref 0–6)
EOSINOPHILS RELATIVE PERCENT: 4 % (ref 0–6)
EOSINOPHILS RELATIVE PERCENT: 5.4 % (ref 0–6)
EPITHELIAL CELLS, UA: ABNORMAL /HPF
FOLATE: 9.2 NG/ML (ref 4.8–24.2)
GFR AFRICAN AMERICAN: 51
GFR AFRICAN AMERICAN: 51
GFR AFRICAN AMERICAN: 56
GFR AFRICAN AMERICAN: 57
GFR AFRICAN AMERICAN: >60
GFR NON-AFRICAN AMERICAN: 42 ML/MIN/1.73
GFR NON-AFRICAN AMERICAN: 42 ML/MIN/1.73
GFR NON-AFRICAN AMERICAN: 47 ML/MIN/1.73
GFR NON-AFRICAN AMERICAN: 52 ML/MIN/1.73
GLUCOSE BLD-MCNC: 107 MG/DL (ref 74–99)
GLUCOSE BLD-MCNC: 110 MG/DL (ref 74–99)
GLUCOSE BLD-MCNC: 112 MG/DL (ref 74–99)
GLUCOSE BLD-MCNC: 114 MG/DL (ref 74–99)
GLUCOSE BLD-MCNC: 125 MG/DL (ref 74–99)
GLUCOSE BLD-MCNC: 133 MG/DL (ref 74–99)
GLUCOSE BLD-MCNC: 155 MG/DL (ref 74–99)
GLUCOSE URINE: NEGATIVE MG/DL
HBA1C MFR BLD: 5.4 % (ref 4–5.6)
HBA1C MFR BLD: 6 % (ref 4–5.6)
HBA1C MFR BLD: 6.2 % (ref 4–5.6)
HCT VFR BLD CALC: 27 % (ref 34–48)
HCT VFR BLD CALC: 27.2 % (ref 34–48)
HCT VFR BLD CALC: 27.4 % (ref 34–48)
HCT VFR BLD CALC: 27.6 % (ref 34–48)
HCT VFR BLD CALC: 27.6 % (ref 34–48)
HCT VFR BLD CALC: 28.1 % (ref 34–48)
HCT VFR BLD CALC: 28.3 % (ref 34–48)
HCT VFR BLD CALC: 28.8 % (ref 34–48)
HCT VFR BLD CALC: 29.7 % (ref 34–48)
HCT VFR BLD CALC: 29.8 % (ref 34–48)
HCT VFR BLD CALC: 29.9 % (ref 34–48)
HCT VFR BLD CALC: 29.9 % (ref 34–48)
HCT VFR BLD CALC: 30 % (ref 34–48)
HCT VFR BLD CALC: 30 % (ref 34–48)
HCT VFR BLD CALC: 30.3 % (ref 34–48)
HCT VFR BLD CALC: 31 % (ref 34–48)
HCT VFR BLD CALC: 31 % (ref 34–48)
HCT VFR BLD CALC: 31.6 % (ref 34–48)
HCT VFR BLD CALC: 32 % (ref 34–48)
HCT VFR BLD CALC: 33.1 % (ref 34–48)
HDLC SERPL-MCNC: 36 MG/DL
HDLC SERPL-MCNC: 37 MG/DL
HDLC SERPL-MCNC: 38 MG/DL
HEMOGLOBIN: 10 G/DL (ref 11.5–15.5)
HEMOGLOBIN: 10.1 G/DL (ref 11.5–15.5)
HEMOGLOBIN: 10.1 G/DL (ref 11.5–15.5)
HEMOGLOBIN: 10.2 G/DL (ref 11.5–15.5)
HEMOGLOBIN: 10.4 G/DL (ref 11.5–15.5)
HEMOGLOBIN: 8.6 G/DL (ref 11.5–15.5)
HEMOGLOBIN: 8.7 G/DL (ref 11.5–15.5)
HEMOGLOBIN: 8.8 G/DL (ref 11.5–15.5)
HEMOGLOBIN: 8.9 G/DL (ref 11.5–15.5)
HEMOGLOBIN: 9 G/DL (ref 11.5–15.5)
HEMOGLOBIN: 9.2 G/DL (ref 11.5–15.5)
HEMOGLOBIN: 9.3 G/DL (ref 11.5–15.5)
HEMOGLOBIN: 9.4 G/DL (ref 11.5–15.5)
HEMOGLOBIN: 9.5 G/DL (ref 11.5–15.5)
HEMOGLOBIN: 9.7 G/DL (ref 11.5–15.5)
HEMOGLOBIN: 9.8 G/DL (ref 11.5–15.5)
IMMATURE GRANULOCYTES #: 0.02 E9/L
IMMATURE GRANULOCYTES #: 0.02 E9/L
IMMATURE GRANULOCYTES #: 0.03 E9/L
IMMATURE GRANULOCYTES #: 0.04 E9/L
IMMATURE GRANULOCYTES %: 0.4 % (ref 0–5)
IMMATURE GRANULOCYTES %: 0.4 % (ref 0–5)
IMMATURE GRANULOCYTES %: 0.6 % (ref 0–5)
IMMATURE GRANULOCYTES %: 0.6 % (ref 0–5)
KETONES, URINE: ABNORMAL MG/DL
KETONES, URINE: NEGATIVE MG/DL
LACTIC ACID: 1.5 MMOL/L (ref 0.5–2.2)
LDL CHOLESTEROL CALCULATED: 37 MG/DL (ref 0–99)
LDL CHOLESTEROL CALCULATED: 47 MG/DL (ref 0–99)
LDL CHOLESTEROL CALCULATED: 55 MG/DL (ref 0–99)
LEUKOCYTE ESTERASE, URINE: ABNORMAL
LIPASE: 14 U/L (ref 13–60)
LYMPHOCYTES ABSOLUTE: 0.51 E9/L (ref 1.5–4)
LYMPHOCYTES ABSOLUTE: 0.56 E9/L (ref 1.5–4)
LYMPHOCYTES ABSOLUTE: 0.75 E9/L (ref 1.5–4)
LYMPHOCYTES ABSOLUTE: 0.9 E9/L (ref 1.5–4)
LYMPHOCYTES RELATIVE PERCENT: 16.8 % (ref 20–42)
LYMPHOCYTES RELATIVE PERCENT: 25.8 % (ref 20–42)
LYMPHOCYTES RELATIVE PERCENT: 6.9 % (ref 20–42)
LYMPHOCYTES RELATIVE PERCENT: 8.1 % (ref 20–42)
MAGNESIUM: 1.7 MG/DL (ref 1.6–2.6)
MAGNESIUM: 1.7 MG/DL (ref 1.6–2.6)
MAGNESIUM: 1.8 MG/DL (ref 1.6–2.6)
MCH RBC QN AUTO: 26.5 PG (ref 26–35)
MCH RBC QN AUTO: 26.6 PG (ref 26–35)
MCH RBC QN AUTO: 26.7 PG (ref 26–35)
MCH RBC QN AUTO: 26.9 PG (ref 26–35)
MCH RBC QN AUTO: 26.9 PG (ref 26–35)
MCH RBC QN AUTO: 27 PG (ref 26–35)
MCH RBC QN AUTO: 27.1 PG (ref 26–35)
MCH RBC QN AUTO: 27.1 PG (ref 26–35)
MCH RBC QN AUTO: 27.2 PG (ref 26–35)
MCH RBC QN AUTO: 27.2 PG (ref 26–35)
MCH RBC QN AUTO: 27.3 PG (ref 26–35)
MCH RBC QN AUTO: 27.5 PG (ref 26–35)
MCH RBC QN AUTO: 27.6 PG (ref 26–35)
MCH RBC QN AUTO: 27.6 PG (ref 26–35)
MCH RBC QN AUTO: 27.7 PG (ref 26–35)
MCH RBC QN AUTO: 28.1 PG (ref 26–35)
MCH RBC QN AUTO: 28.3 PG (ref 26–35)
MCH RBC QN AUTO: 28.4 PG (ref 26–35)
MCH RBC QN AUTO: 28.4 PG (ref 26–35)
MCH RBC QN AUTO: 28.5 PG (ref 26–35)
MCHC RBC AUTO-ENTMCNC: 31 % (ref 32–34.5)
MCHC RBC AUTO-ENTMCNC: 31 % (ref 32–34.5)
MCHC RBC AUTO-ENTMCNC: 31.2 % (ref 32–34.5)
MCHC RBC AUTO-ENTMCNC: 31.3 % (ref 32–34.5)
MCHC RBC AUTO-ENTMCNC: 31.3 % (ref 32–34.5)
MCHC RBC AUTO-ENTMCNC: 31.4 % (ref 32–34.5)
MCHC RBC AUTO-ENTMCNC: 31.4 % (ref 32–34.5)
MCHC RBC AUTO-ENTMCNC: 31.6 % (ref 32–34.5)
MCHC RBC AUTO-ENTMCNC: 31.6 % (ref 32–34.5)
MCHC RBC AUTO-ENTMCNC: 31.8 % (ref 32–34.5)
MCHC RBC AUTO-ENTMCNC: 31.8 % (ref 32–34.5)
MCHC RBC AUTO-ENTMCNC: 31.9 % (ref 32–34.5)
MCHC RBC AUTO-ENTMCNC: 32.2 % (ref 32–34.5)
MCHC RBC AUTO-ENTMCNC: 32.6 % (ref 32–34.5)
MCHC RBC AUTO-ENTMCNC: 32.6 % (ref 32–34.5)
MCHC RBC AUTO-ENTMCNC: 32.7 % (ref 32–34.5)
MCHC RBC AUTO-ENTMCNC: 32.8 % (ref 32–34.5)
MCV RBC AUTO: 83.9 FL (ref 80–99.9)
MCV RBC AUTO: 84 FL (ref 80–99.9)
MCV RBC AUTO: 84.5 FL (ref 80–99.9)
MCV RBC AUTO: 84.9 FL (ref 80–99.9)
MCV RBC AUTO: 84.9 FL (ref 80–99.9)
MCV RBC AUTO: 85.1 FL (ref 80–99.9)
MCV RBC AUTO: 85.6 FL (ref 80–99.9)
MCV RBC AUTO: 85.7 FL (ref 80–99.9)
MCV RBC AUTO: 86 FL (ref 80–99.9)
MCV RBC AUTO: 86.3 FL (ref 80–99.9)
MCV RBC AUTO: 86.3 FL (ref 80–99.9)
MCV RBC AUTO: 86.5 FL (ref 80–99.9)
MCV RBC AUTO: 86.8 FL (ref 80–99.9)
MCV RBC AUTO: 86.8 FL (ref 80–99.9)
MCV RBC AUTO: 87.1 FL (ref 80–99.9)
MCV RBC AUTO: 87.1 FL (ref 80–99.9)
MCV RBC AUTO: 87.3 FL (ref 80–99.9)
MCV RBC AUTO: 87.7 FL (ref 80–99.9)
MCV RBC AUTO: 88 FL (ref 80–99.9)
MCV RBC AUTO: 91.1 FL (ref 80–99.9)
MONOCYTES ABSOLUTE: 0.3 E9/L (ref 0.1–0.95)
MONOCYTES ABSOLUTE: 0.41 E9/L (ref 0.1–0.95)
MONOCYTES ABSOLUTE: 0.42 E9/L (ref 0.1–0.95)
MONOCYTES ABSOLUTE: 0.44 E9/L (ref 0.1–0.95)
MONOCYTES RELATIVE PERCENT: 6 % (ref 2–12)
MONOCYTES RELATIVE PERCENT: 6 % (ref 2–12)
MONOCYTES RELATIVE PERCENT: 8.6 % (ref 2–12)
MONOCYTES RELATIVE PERCENT: 9.4 % (ref 2–12)
NEUTROPHILS ABSOLUTE: 2.07 E9/L (ref 1.8–7.3)
NEUTROPHILS ABSOLUTE: 3.08 E9/L (ref 1.8–7.3)
NEUTROPHILS ABSOLUTE: 5.64 E9/L (ref 1.8–7.3)
NEUTROPHILS ABSOLUTE: 6.16 E9/L (ref 1.8–7.3)
NEUTROPHILS RELATIVE PERCENT: 59.3 % (ref 43–80)
NEUTROPHILS RELATIVE PERCENT: 69 % (ref 43–80)
NEUTROPHILS RELATIVE PERCENT: 82 % (ref 43–80)
NEUTROPHILS RELATIVE PERCENT: 83.6 % (ref 43–80)
NITRITE, URINE: NEGATIVE
NITRITE, URINE: POSITIVE
OCCULT BLOOD SCREENING: NORMAL
OCCULT BLOOD SCREENING: NORMAL
ORGANISM: ABNORMAL
OVALOCYTES: ABNORMAL
OVALOCYTES: ABNORMAL
PDW BLD-RTO: 13.5 FL (ref 11.5–15)
PDW BLD-RTO: 13.6 FL (ref 11.5–15)
PDW BLD-RTO: 13.7 FL (ref 11.5–15)
PDW BLD-RTO: 13.8 FL (ref 11.5–15)
PDW BLD-RTO: 13.9 FL (ref 11.5–15)
PDW BLD-RTO: 14 FL (ref 11.5–15)
PDW BLD-RTO: 14.1 FL (ref 11.5–15)
PDW BLD-RTO: 14.3 FL (ref 11.5–15)
PDW BLD-RTO: 14.4 FL (ref 11.5–15)
PDW BLD-RTO: 14.4 FL (ref 11.5–15)
PDW BLD-RTO: 14.5 FL (ref 11.5–15)
PDW BLD-RTO: 14.5 FL (ref 11.5–15)
PDW BLD-RTO: 14.6 FL (ref 11.5–15)
PDW BLD-RTO: 14.6 FL (ref 11.5–15)
PDW BLD-RTO: 14.9 FL (ref 11.5–15)
PDW BLD-RTO: 14.9 FL (ref 11.5–15)
PDW BLD-RTO: 15 FL (ref 11.5–15)
PH UA: 5.5 (ref 5–9)
PH UA: 6 (ref 5–9)
PH UA: 6.5 (ref 5–9)
PH UA: 6.5 (ref 5–9)
PH UA: 7 (ref 5–9)
PH UA: 7.5 (ref 5–9)
PHOSPHORUS: 1.9 MG/DL (ref 2.5–4.5)
PHOSPHORUS: 2.9 MG/DL (ref 2.5–4.5)
PHOSPHORUS: 3.1 MG/DL (ref 2.5–4.5)
PLATELET # BLD: 101 E9/L (ref 130–450)
PLATELET # BLD: 105 E9/L (ref 130–450)
PLATELET # BLD: 110 E9/L (ref 130–450)
PLATELET # BLD: 112 E9/L (ref 130–450)
PLATELET # BLD: 114 E9/L (ref 130–450)
PLATELET # BLD: 116 E9/L (ref 130–450)
PLATELET # BLD: 117 E9/L (ref 130–450)
PLATELET # BLD: 117 E9/L (ref 130–450)
PLATELET # BLD: 121 E9/L (ref 130–450)
PLATELET # BLD: 121 E9/L (ref 130–450)
PLATELET # BLD: 122 E9/L (ref 130–450)
PLATELET # BLD: 123 E9/L (ref 130–450)
PLATELET # BLD: 128 E9/L (ref 130–450)
PLATELET # BLD: 132 E9/L (ref 130–450)
PLATELET # BLD: 133 E9/L (ref 130–450)
PLATELET # BLD: 138 E9/L (ref 130–450)
PLATELET # BLD: 155 E9/L (ref 130–450)
PLATELET # BLD: 84 E9/L (ref 130–450)
PLATELET # BLD: 92 E9/L (ref 130–450)
PLATELET # BLD: 98 E9/L (ref 130–450)
PLATELET CONFIRMATION: NORMAL
PMV BLD AUTO: 10.7 FL (ref 7–12)
PMV BLD AUTO: 11 FL (ref 7–12)
PMV BLD AUTO: 11.1 FL (ref 7–12)
PMV BLD AUTO: 11.3 FL (ref 7–12)
PMV BLD AUTO: 11.3 FL (ref 7–12)
PMV BLD AUTO: 11.4 FL (ref 7–12)
PMV BLD AUTO: 11.6 FL (ref 7–12)
PMV BLD AUTO: 11.6 FL (ref 7–12)
PMV BLD AUTO: 11.7 FL (ref 7–12)
PMV BLD AUTO: 11.8 FL (ref 7–12)
PMV BLD AUTO: 11.8 FL (ref 7–12)
PMV BLD AUTO: 11.9 FL (ref 7–12)
PMV BLD AUTO: 12 FL (ref 7–12)
PMV BLD AUTO: 12.1 FL (ref 7–12)
PMV BLD AUTO: 12.3 FL (ref 7–12)
POIKILOCYTES: ABNORMAL
POIKILOCYTES: ABNORMAL
POTASSIUM REFLEX MAGNESIUM: 4 MMOL/L (ref 3.5–5)
POTASSIUM SERPL-SCNC: 3.6 MMOL/L (ref 3.5–5)
POTASSIUM SERPL-SCNC: 3.8 MMOL/L (ref 3.5–5)
POTASSIUM SERPL-SCNC: 3.9 MMOL/L (ref 3.5–5)
POTASSIUM SERPL-SCNC: 4 MMOL/L (ref 3.5–5)
PRO-BNP: 203 PG/ML (ref 0–450)
PRO-BNP: 367 PG/ML (ref 0–450)
PROCALCITONIN: 0.24 NG/ML (ref 0–0.08)
PROCALCITONIN: 0.25 NG/ML (ref 0–0.08)
PROTEIN UA: 100 MG/DL
PROTEIN UA: 100 MG/DL
PROTEIN UA: 30 MG/DL
PROTEIN UA: 30 MG/DL
PROTEIN UA: ABNORMAL MG/DL
PROTEIN UA: NEGATIVE MG/DL
RBC # BLD: 3.03 E12/L (ref 3.5–5.5)
RBC # BLD: 3.1 E12/L (ref 3.5–5.5)
RBC # BLD: 3.2 E12/L (ref 3.5–5.5)
RBC # BLD: 3.24 E12/L (ref 3.5–5.5)
RBC # BLD: 3.29 E12/L (ref 3.5–5.5)
RBC # BLD: 3.31 E12/L (ref 3.5–5.5)
RBC # BLD: 3.33 E12/L (ref 3.5–5.5)
RBC # BLD: 3.35 E12/L (ref 3.5–5.5)
RBC # BLD: 3.41 E12/L (ref 3.5–5.5)
RBC # BLD: 3.41 E12/L (ref 3.5–5.5)
RBC # BLD: 3.42 E12/L (ref 3.5–5.5)
RBC # BLD: 3.49 E12/L (ref 3.5–5.5)
RBC # BLD: 3.49 E12/L (ref 3.5–5.5)
RBC # BLD: 3.5 E12/L (ref 3.5–5.5)
RBC # BLD: 3.52 E12/L (ref 3.5–5.5)
RBC # BLD: 3.55 E12/L (ref 3.5–5.5)
RBC # BLD: 3.57 E12/L (ref 3.5–5.5)
RBC # BLD: 3.66 E12/L (ref 3.5–5.5)
RBC # BLD: 3.71 E12/L (ref 3.5–5.5)
RBC # BLD: 3.85 E12/L (ref 3.5–5.5)
RBC UA: >20 /HPF (ref 0–2)
RBC UA: ABNORMAL /HPF (ref 0–2)
ROULEAUX: ABNORMAL
SODIUM BLD-SCNC: 135 MMOL/L (ref 132–146)
SODIUM BLD-SCNC: 139 MMOL/L (ref 132–146)
SODIUM BLD-SCNC: 139 MMOL/L (ref 132–146)
SODIUM BLD-SCNC: 140 MMOL/L (ref 132–146)
SODIUM BLD-SCNC: 140 MMOL/L (ref 132–146)
SODIUM BLD-SCNC: 145 MMOL/L (ref 132–146)
SODIUM BLD-SCNC: 145 MMOL/L (ref 132–146)
SPECIFIC GRAVITY UA: 1.01 (ref 1–1.03)
SPECIFIC GRAVITY UA: 1.01 (ref 1–1.03)
SPECIFIC GRAVITY UA: 1.02 (ref 1–1.03)
SPECIFIC GRAVITY UA: 1.02 (ref 1–1.03)
SPECIFIC GRAVITY UA: <=1.005 (ref 1–1.03)
SPECIFIC GRAVITY UA: <=1.005 (ref 1–1.03)
TOTAL PROTEIN: 5.5 G/DL (ref 6.4–8.3)
TOTAL PROTEIN: 5.6 G/DL (ref 6.4–8.3)
TOTAL PROTEIN: 5.8 G/DL (ref 6.4–8.3)
TOTAL PROTEIN: 5.8 G/DL (ref 6.4–8.3)
TOTAL PROTEIN: 6 G/DL (ref 6.4–8.3)
TOTAL PROTEIN: 6.3 G/DL (ref 6.4–8.3)
TRIGL SERPL-MCNC: 105 MG/DL (ref 0–149)
TRIGL SERPL-MCNC: 107 MG/DL (ref 0–149)
TRIGL SERPL-MCNC: 124 MG/DL (ref 0–149)
TROPONIN: <0.01 NG/ML (ref 0–0.03)
TSH SERPL DL<=0.05 MIU/L-ACNC: 2.04 UIU/ML (ref 0.27–4.2)
TSH SERPL DL<=0.05 MIU/L-ACNC: 3.08 UIU/ML (ref 0.27–4.2)
URINE CULTURE, ROUTINE: ABNORMAL
URINE CULTURE, ROUTINE: NORMAL
UROBILINOGEN, URINE: 0.2 E.U./DL
VITAMIN B-12: 1214 PG/ML (ref 211–946)
VITAMIN D 25-HYDROXY: 50 NG/ML (ref 30–100)
VLDLC SERPL CALC-MCNC: 21 MG/DL
VLDLC SERPL CALC-MCNC: 21 MG/DL
VLDLC SERPL CALC-MCNC: 25 MG/DL
WBC # BLD: 3.5 E9/L (ref 4.5–11.5)
WBC # BLD: 4.1 E9/L (ref 4.5–11.5)
WBC # BLD: 4.3 E9/L (ref 4.5–11.5)
WBC # BLD: 4.4 E9/L (ref 4.5–11.5)
WBC # BLD: 4.4 E9/L (ref 4.5–11.5)
WBC # BLD: 4.5 E9/L (ref 4.5–11.5)
WBC # BLD: 4.6 E9/L (ref 4.5–11.5)
WBC # BLD: 4.7 E9/L (ref 4.5–11.5)
WBC # BLD: 4.7 E9/L (ref 4.5–11.5)
WBC # BLD: 4.8 E9/L (ref 4.5–11.5)
WBC # BLD: 5 E9/L (ref 4.5–11.5)
WBC # BLD: 6 E9/L (ref 4.5–11.5)
WBC # BLD: 6.9 E9/L (ref 4.5–11.5)
WBC # BLD: 7.4 E9/L (ref 4.5–11.5)
WBC UA: >20 /HPF (ref 0–5)
WBC UA: ABNORMAL /HPF (ref 0–5)

## 2019-01-01 PROCEDURE — 84145 PROCALCITONIN (PCT): CPT

## 2019-01-01 PROCEDURE — 81001 URINALYSIS AUTO W/SCOPE: CPT

## 2019-01-01 PROCEDURE — 85027 COMPLETE CBC AUTOMATED: CPT

## 2019-01-01 PROCEDURE — 87088 URINE BACTERIA CULTURE: CPT

## 2019-01-01 PROCEDURE — 85025 COMPLETE CBC W/AUTO DIFF WBC: CPT

## 2019-01-01 PROCEDURE — 36415 COLL VENOUS BLD VENIPUNCTURE: CPT

## 2019-01-01 PROCEDURE — 80076 HEPATIC FUNCTION PANEL: CPT

## 2019-01-01 PROCEDURE — 83880 ASSAY OF NATRIURETIC PEPTIDE: CPT

## 2019-01-01 PROCEDURE — 2709999900 HC NON-CHARGEABLE SUPPLY: Performed by: UROLOGY

## 2019-01-01 PROCEDURE — 83735 ASSAY OF MAGNESIUM: CPT

## 2019-01-01 PROCEDURE — 6360000002 HC RX W HCPCS: Performed by: INTERNAL MEDICINE

## 2019-01-01 PROCEDURE — 6360000002 HC RX W HCPCS: Performed by: EMERGENCY MEDICINE

## 2019-01-01 PROCEDURE — 2580000003 HC RX 258: Performed by: NURSE ANESTHETIST, CERTIFIED REGISTERED

## 2019-01-01 PROCEDURE — 82607 VITAMIN B-12: CPT

## 2019-01-01 PROCEDURE — 71045 X-RAY EXAM CHEST 1 VIEW: CPT

## 2019-01-01 PROCEDURE — 80053 COMPREHEN METABOLIC PANEL: CPT

## 2019-01-01 PROCEDURE — 97530 THERAPEUTIC ACTIVITIES: CPT

## 2019-01-01 PROCEDURE — 84100 ASSAY OF PHOSPHORUS: CPT

## 2019-01-01 PROCEDURE — 3600000002 HC SURGERY LEVEL 2 BASE: Performed by: UROLOGY

## 2019-01-01 PROCEDURE — 80061 LIPID PANEL: CPT

## 2019-01-01 PROCEDURE — 87077 CULTURE AEROBIC IDENTIFY: CPT

## 2019-01-01 PROCEDURE — 80048 BASIC METABOLIC PNL TOTAL CA: CPT

## 2019-01-01 PROCEDURE — 83605 ASSAY OF LACTIC ACID: CPT

## 2019-01-01 PROCEDURE — 84443 ASSAY THYROID STIM HORMONE: CPT

## 2019-01-01 PROCEDURE — 96375 TX/PRO/DX INJ NEW DRUG ADDON: CPT

## 2019-01-01 PROCEDURE — 83036 HEMOGLOBIN GLYCOSYLATED A1C: CPT

## 2019-01-01 PROCEDURE — 1200000000 HC SEMI PRIVATE

## 2019-01-01 PROCEDURE — 82746 ASSAY OF FOLIC ACID SERUM: CPT

## 2019-01-01 PROCEDURE — 6360000002 HC RX W HCPCS: Performed by: UROLOGY

## 2019-01-01 PROCEDURE — 7100000010 HC PHASE II RECOVERY - FIRST 15 MIN: Performed by: UROLOGY

## 2019-01-01 PROCEDURE — 3700000001 HC ADD 15 MINUTES (ANESTHESIA): Performed by: UROLOGY

## 2019-01-01 PROCEDURE — 7100000011 HC PHASE II RECOVERY - ADDTL 15 MIN: Performed by: UROLOGY

## 2019-01-01 PROCEDURE — 83690 ASSAY OF LIPASE: CPT

## 2019-01-01 PROCEDURE — 2500000003 HC RX 250 WO HCPCS: Performed by: INTERNAL MEDICINE

## 2019-01-01 PROCEDURE — 87186 SC STD MICRODIL/AGAR DIL: CPT

## 2019-01-01 PROCEDURE — 6360000002 HC RX W HCPCS: Performed by: NURSE ANESTHETIST, CERTIFIED REGISTERED

## 2019-01-01 PROCEDURE — 6370000000 HC RX 637 (ALT 250 FOR IP): Performed by: INTERNAL MEDICINE

## 2019-01-01 PROCEDURE — 2580000003 HC RX 258: Performed by: STUDENT IN AN ORGANIZED HEALTH CARE EDUCATION/TRAINING PROGRAM

## 2019-01-01 PROCEDURE — G0328 FECAL BLOOD SCRN IMMUNOASSAY: HCPCS

## 2019-01-01 PROCEDURE — 97162 PT EVAL MOD COMPLEX 30 MIN: CPT

## 2019-01-01 PROCEDURE — 97166 OT EVAL MOD COMPLEX 45 MIN: CPT

## 2019-01-01 PROCEDURE — 2580000003 HC RX 258: Performed by: INTERNAL MEDICINE

## 2019-01-01 PROCEDURE — 84484 ASSAY OF TROPONIN QUANT: CPT

## 2019-01-01 PROCEDURE — 93005 ELECTROCARDIOGRAM TRACING: CPT | Performed by: STUDENT IN AN ORGANIZED HEALTH CARE EDUCATION/TRAINING PROGRAM

## 2019-01-01 PROCEDURE — 3700000000 HC ANESTHESIA ATTENDED CARE: Performed by: UROLOGY

## 2019-01-01 PROCEDURE — 82306 VITAMIN D 25 HYDROXY: CPT

## 2019-01-01 PROCEDURE — 2500000003 HC RX 250 WO HCPCS: Performed by: EMERGENCY MEDICINE

## 2019-01-01 PROCEDURE — 6360000002 HC RX W HCPCS: Performed by: STUDENT IN AN ORGANIZED HEALTH CARE EDUCATION/TRAINING PROGRAM

## 2019-01-01 PROCEDURE — 3600000012 HC SURGERY LEVEL 2 ADDTL 15MIN: Performed by: UROLOGY

## 2019-01-01 PROCEDURE — 87040 BLOOD CULTURE FOR BACTERIA: CPT

## 2019-01-01 PROCEDURE — 6370000000 HC RX 637 (ALT 250 FOR IP): Performed by: STUDENT IN AN ORGANIZED HEALTH CARE EDUCATION/TRAINING PROGRAM

## 2019-01-01 PROCEDURE — 99285 EMERGENCY DEPT VISIT HI MDM: CPT

## 2019-01-01 PROCEDURE — 2500000003 HC RX 250 WO HCPCS: Performed by: UROLOGY

## 2019-01-01 PROCEDURE — 93010 ELECTROCARDIOGRAM REPORT: CPT | Performed by: INTERNAL MEDICINE

## 2019-01-01 PROCEDURE — 96365 THER/PROPH/DIAG IV INF INIT: CPT

## 2019-01-01 RX ORDER — PROPOFOL 10 MG/ML
INJECTION, EMULSION INTRAVENOUS CONTINUOUS PRN
Status: DISCONTINUED | OUTPATIENT
Start: 2019-01-01 | End: 2019-01-01 | Stop reason: SDUPTHER

## 2019-01-01 RX ORDER — LEVOFLOXACIN 5 MG/ML
500 INJECTION, SOLUTION INTRAVENOUS
Status: DISCONTINUED | OUTPATIENT
Start: 2019-01-01 | End: 2019-01-01

## 2019-01-01 RX ORDER — ACETAMINOPHEN 325 MG/1
650 TABLET ORAL EVERY 4 HOURS PRN
Status: DISCONTINUED | OUTPATIENT
Start: 2019-01-01 | End: 2019-01-01 | Stop reason: HOSPADM

## 2019-01-01 RX ORDER — PANTOPRAZOLE SODIUM 40 MG/1
40 TABLET, DELAYED RELEASE ORAL
Status: DISCONTINUED | OUTPATIENT
Start: 2019-01-01 | End: 2019-01-01 | Stop reason: HOSPADM

## 2019-01-01 RX ORDER — SODIUM PHOSPHATE, DIBASIC AND SODIUM PHOSPHATE, MONOBASIC 7; 19 G/133ML; G/133ML
1 ENEMA RECTAL DAILY PRN
COMMUNITY

## 2019-01-01 RX ORDER — DONEPEZIL HYDROCHLORIDE 5 MG/1
10 TABLET, FILM COATED ORAL NIGHTLY
Status: DISCONTINUED | OUTPATIENT
Start: 2019-01-01 | End: 2019-01-01 | Stop reason: HOSPADM

## 2019-01-01 RX ORDER — PROPOFOL 10 MG/ML
INJECTION, EMULSION INTRAVENOUS PRN
Status: DISCONTINUED | OUTPATIENT
Start: 2019-01-01 | End: 2019-01-01 | Stop reason: SDUPTHER

## 2019-01-01 RX ORDER — LIDOCAINE HYDROCHLORIDE 10 MG/ML
INJECTION, SOLUTION INFILTRATION; PERINEURAL PRN
Status: DISCONTINUED | OUTPATIENT
Start: 2019-01-01 | End: 2019-01-01 | Stop reason: ALTCHOICE

## 2019-01-01 RX ORDER — AMLODIPINE BESYLATE 10 MG/1
10 TABLET ORAL DAILY
Status: DISCONTINUED | OUTPATIENT
Start: 2019-01-01 | End: 2019-01-01 | Stop reason: HOSPADM

## 2019-01-01 RX ORDER — 0.9 % SODIUM CHLORIDE 0.9 %
1000 INTRAVENOUS SOLUTION INTRAVENOUS ONCE
Status: COMPLETED | OUTPATIENT
Start: 2019-01-01 | End: 2019-01-01

## 2019-01-01 RX ORDER — NITROFURANTOIN 25; 75 MG/1; MG/1
100 CAPSULE ORAL 2 TIMES DAILY
Status: ON HOLD | COMMUNITY
End: 2019-01-01 | Stop reason: HOSPADM

## 2019-01-01 RX ORDER — BUSPIRONE HYDROCHLORIDE 10 MG/1
10 TABLET ORAL 3 TIMES DAILY
Status: DISCONTINUED | OUTPATIENT
Start: 2019-01-01 | End: 2019-01-01 | Stop reason: HOSPADM

## 2019-01-01 RX ORDER — CEFAZOLIN SODIUM 2 G/50ML
2 SOLUTION INTRAVENOUS
Status: COMPLETED | OUTPATIENT
Start: 2019-01-01 | End: 2019-01-01

## 2019-01-01 RX ORDER — SODIUM CHLORIDE 0.9 % (FLUSH) 0.9 %
10 SYRINGE (ML) INJECTION EVERY 12 HOURS SCHEDULED
Status: DISCONTINUED | OUTPATIENT
Start: 2019-01-01 | End: 2019-01-01 | Stop reason: HOSPADM

## 2019-01-01 RX ORDER — BISACODYL 10 MG
10 SUPPOSITORY, RECTAL RECTAL DAILY PRN
Status: DISCONTINUED | OUTPATIENT
Start: 2019-01-01 | End: 2019-01-01 | Stop reason: HOSPADM

## 2019-01-01 RX ORDER — ASPIRIN 81 MG/1
81 TABLET ORAL DAILY
Status: DISCONTINUED | OUTPATIENT
Start: 2019-01-01 | End: 2019-01-01 | Stop reason: HOSPADM

## 2019-01-01 RX ORDER — SUCRALFATE 1 G/1
1 TABLET ORAL
Status: DISCONTINUED | OUTPATIENT
Start: 2019-01-01 | End: 2019-01-01 | Stop reason: HOSPADM

## 2019-01-01 RX ORDER — ONDANSETRON 2 MG/ML
4 INJECTION INTRAMUSCULAR; INTRAVENOUS ONCE
Status: COMPLETED | OUTPATIENT
Start: 2019-01-01 | End: 2019-01-01

## 2019-01-01 RX ORDER — LISINOPRIL 20 MG/1
40 TABLET ORAL DAILY
Status: DISCONTINUED | OUTPATIENT
Start: 2019-01-01 | End: 2019-01-01 | Stop reason: HOSPADM

## 2019-01-01 RX ORDER — LANOLIN ALCOHOL/MO/W.PET/CERES
1000 CREAM (GRAM) TOPICAL DAILY
Status: DISCONTINUED | OUTPATIENT
Start: 2019-01-01 | End: 2019-01-01 | Stop reason: HOSPADM

## 2019-01-01 RX ORDER — PHENAZOPYRIDINE HYDROCHLORIDE 100 MG/1
100 TABLET, FILM COATED ORAL DAILY
Status: ON HOLD | COMMUNITY
End: 2019-01-01 | Stop reason: HOSPADM

## 2019-01-01 RX ORDER — ATORVASTATIN CALCIUM 40 MG/1
40 TABLET, FILM COATED ORAL NIGHTLY
Status: DISCONTINUED | OUTPATIENT
Start: 2019-01-01 | End: 2019-01-01 | Stop reason: HOSPADM

## 2019-01-01 RX ORDER — FENTANYL CITRATE 50 UG/ML
INJECTION, SOLUTION INTRAMUSCULAR; INTRAVENOUS PRN
Status: DISCONTINUED | OUTPATIENT
Start: 2019-01-01 | End: 2019-01-01 | Stop reason: SDUPTHER

## 2019-01-01 RX ORDER — MIRTAZAPINE 15 MG/1
7.5 TABLET, FILM COATED ORAL NIGHTLY
Status: DISCONTINUED | OUTPATIENT
Start: 2019-01-01 | End: 2019-01-01 | Stop reason: HOSPADM

## 2019-01-01 RX ORDER — SIMETHICONE 80 MG
80 TABLET,CHEWABLE ORAL 4 TIMES DAILY PRN
Status: DISCONTINUED | OUTPATIENT
Start: 2019-01-01 | End: 2019-01-01 | Stop reason: HOSPADM

## 2019-01-01 RX ORDER — ACETAMINOPHEN 500 MG
1000 TABLET ORAL ONCE
Status: COMPLETED | OUTPATIENT
Start: 2019-01-01 | End: 2019-01-01

## 2019-01-01 RX ORDER — HYDROCORTISONE 0.5 %
CREAM (GRAM) TOPICAL 3 TIMES DAILY PRN
COMMUNITY

## 2019-01-01 RX ORDER — POLYVINYL ALCOHOL 14 MG/ML
1 SOLUTION/ DROPS OPHTHALMIC EVERY 6 HOURS PRN
Status: DISCONTINUED | OUTPATIENT
Start: 2019-01-01 | End: 2019-01-01 | Stop reason: HOSPADM

## 2019-01-01 RX ORDER — DONEPEZIL HYDROCHLORIDE 10 MG/1
TABLET, FILM COATED ORAL
Refills: 11 | COMMUNITY
Start: 2019-01-01

## 2019-01-01 RX ORDER — SODIUM CHLORIDE 0.9 % (FLUSH) 0.9 %
10 SYRINGE (ML) INJECTION PRN
Status: DISCONTINUED | OUTPATIENT
Start: 2019-01-01 | End: 2019-01-01 | Stop reason: HOSPADM

## 2019-01-01 RX ORDER — SODIUM CHLORIDE 9 MG/ML
INJECTION, SOLUTION INTRAVENOUS CONTINUOUS PRN
Status: DISCONTINUED | OUTPATIENT
Start: 2019-01-01 | End: 2019-01-01 | Stop reason: SDUPTHER

## 2019-01-01 RX ORDER — SODIUM CHLORIDE 0.9 % (FLUSH) 0.9 %
10 SYRINGE (ML) INJECTION 2 TIMES DAILY
Status: DISCONTINUED | OUTPATIENT
Start: 2019-01-01 | End: 2019-01-01 | Stop reason: HOSPADM

## 2019-01-01 RX ORDER — SODIUM CHLORIDE 9 MG/ML
INJECTION, SOLUTION INTRAVENOUS CONTINUOUS
Status: DISCONTINUED | OUTPATIENT
Start: 2019-01-01 | End: 2019-01-01 | Stop reason: HOSPADM

## 2019-01-01 RX ADMIN — SUCRALFATE 1 G: 1 TABLET ORAL at 13:00

## 2019-01-01 RX ADMIN — ATORVASTATIN CALCIUM 40 MG: 40 TABLET, FILM COATED ORAL at 21:02

## 2019-01-01 RX ADMIN — SUCRALFATE 1 G: 1 TABLET ORAL at 16:52

## 2019-01-01 RX ADMIN — SUCRALFATE 1 G: 1 TABLET ORAL at 08:58

## 2019-01-01 RX ADMIN — MIRTAZAPINE 7.5 MG: 15 TABLET, FILM COATED ORAL at 21:02

## 2019-01-01 RX ADMIN — SODIUM CHLORIDE: 9 INJECTION, SOLUTION INTRAVENOUS at 02:05

## 2019-01-01 RX ADMIN — ASPIRIN 81 MG: 81 TABLET, COATED ORAL at 08:57

## 2019-01-01 RX ADMIN — VITAMIN D, TAB 1000IU (100/BT) 5000 UNITS: 25 TAB at 08:57

## 2019-01-01 RX ADMIN — FENTANYL CITRATE 50 MCG: 50 INJECTION, SOLUTION INTRAMUSCULAR; INTRAVENOUS at 08:26

## 2019-01-01 RX ADMIN — ATORVASTATIN CALCIUM 40 MG: 40 TABLET, FILM COATED ORAL at 20:38

## 2019-01-01 RX ADMIN — PANTOPRAZOLE SODIUM 40 MG: 40 TABLET, DELAYED RELEASE ORAL at 06:05

## 2019-01-01 RX ADMIN — CYANOCOBALAMIN TAB 1000 MCG 1000 MCG: 1000 TAB at 09:28

## 2019-01-01 RX ADMIN — BUSPIRONE HYDROCHLORIDE 10 MG: 10 TABLET ORAL at 21:02

## 2019-01-01 RX ADMIN — SUCRALFATE 1 G: 1 TABLET ORAL at 21:01

## 2019-01-01 RX ADMIN — AMLODIPINE BESYLATE 10 MG: 10 TABLET ORAL at 09:29

## 2019-01-01 RX ADMIN — Medication 10 ML: at 21:02

## 2019-01-01 RX ADMIN — VITAMIN D, TAB 1000IU (100/BT) 5000 UNITS: 25 TAB at 09:28

## 2019-01-01 RX ADMIN — SUCRALFATE 1 G: 1 TABLET ORAL at 20:38

## 2019-01-01 RX ADMIN — DONEPEZIL HYDROCHLORIDE 10 MG: 5 TABLET, FILM COATED ORAL at 21:02

## 2019-01-01 RX ADMIN — PANTOPRAZOLE SODIUM 40 MG: 40 TABLET, DELAYED RELEASE ORAL at 06:57

## 2019-01-01 RX ADMIN — SUCRALFATE 1 G: 1 TABLET ORAL at 08:14

## 2019-01-01 RX ADMIN — Medication 10 ML: at 12:18

## 2019-01-01 RX ADMIN — BUSPIRONE HYDROCHLORIDE 10 MG: 10 TABLET ORAL at 09:28

## 2019-01-01 RX ADMIN — PROPOFOL 50 MG: 10 INJECTION, EMULSION INTRAVENOUS at 08:26

## 2019-01-01 RX ADMIN — ONDANSETRON 4 MG: 2 INJECTION INTRAMUSCULAR; INTRAVENOUS at 22:14

## 2019-01-01 RX ADMIN — DONEPEZIL HYDROCHLORIDE 10 MG: 5 TABLET, FILM COATED ORAL at 20:38

## 2019-01-01 RX ADMIN — BUSPIRONE HYDROCHLORIDE 10 MG: 10 TABLET ORAL at 20:38

## 2019-01-01 RX ADMIN — BUSPIRONE HYDROCHLORIDE 10 MG: 10 TABLET ORAL at 14:54

## 2019-01-01 RX ADMIN — CEFTRIAXONE 2 G: 2 INJECTION, POWDER, FOR SOLUTION INTRAMUSCULAR; INTRAVENOUS at 22:13

## 2019-01-01 RX ADMIN — CEFAZOLIN SODIUM 2 G: 2 SOLUTION INTRAVENOUS at 08:20

## 2019-01-01 RX ADMIN — LISINOPRIL 40 MG: 20 TABLET ORAL at 10:55

## 2019-01-01 RX ADMIN — BUSPIRONE HYDROCHLORIDE 10 MG: 10 TABLET ORAL at 08:57

## 2019-01-01 RX ADMIN — ENOXAPARIN SODIUM 30 MG: 30 INJECTION SUBCUTANEOUS at 09:29

## 2019-01-01 RX ADMIN — SODIUM CHLORIDE 1000 ML: 9 INJECTION, SOLUTION INTRAVENOUS at 23:12

## 2019-01-01 RX ADMIN — ACETAMINOPHEN 1000 MG: 500 TABLET ORAL at 20:26

## 2019-01-01 RX ADMIN — MIRTAZAPINE 7.5 MG: 15 TABLET, FILM COATED ORAL at 20:38

## 2019-01-01 RX ADMIN — CYANOCOBALAMIN TAB 1000 MCG 1000 MCG: 1000 TAB at 08:57

## 2019-01-01 RX ADMIN — AMLODIPINE BESYLATE 10 MG: 10 TABLET ORAL at 08:57

## 2019-01-01 RX ADMIN — LISINOPRIL 40 MG: 20 TABLET ORAL at 08:57

## 2019-01-01 RX ADMIN — SODIUM CHLORIDE: 9 INJECTION, SOLUTION INTRAVENOUS at 08:20

## 2019-01-01 RX ADMIN — LEVOFLOXACIN 500 MG: 5 INJECTION, SOLUTION INTRAVENOUS at 02:00

## 2019-01-01 RX ADMIN — FAMOTIDINE 20 MG: 10 INJECTION, SOLUTION INTRAVENOUS at 22:14

## 2019-01-01 RX ADMIN — BUSPIRONE HYDROCHLORIDE 10 MG: 10 TABLET ORAL at 15:33

## 2019-01-01 RX ADMIN — ASPIRIN 81 MG: 81 TABLET, COATED ORAL at 09:29

## 2019-01-01 RX ADMIN — PROPOFOL 100 MCG/KG/MIN: 10 INJECTION, EMULSION INTRAVENOUS at 08:26

## 2019-01-01 RX ADMIN — ENOXAPARIN SODIUM 30 MG: 30 INJECTION SUBCUTANEOUS at 08:57

## 2019-01-01 RX ADMIN — Medication 10 ML: at 08:58

## 2019-01-01 RX ADMIN — CALCIUM GLUCONATE 1 G: 98 INJECTION, SOLUTION INTRAVENOUS at 15:00

## 2019-01-01 RX ADMIN — POTASSIUM PHOSPHATE, MONOBASIC AND POTASSIUM PHOSPHATE, DIBASIC 20 MMOL: 224; 236 INJECTION, SOLUTION, CONCENTRATE INTRAVENOUS at 16:39

## 2019-01-01 RX ADMIN — SUCRALFATE 1 G: 1 TABLET ORAL at 11:47

## 2019-01-01 ASSESSMENT — ENCOUNTER SYMPTOMS
DIARRHEA: 0
BACK PAIN: 0
CONSTIPATION: 0
CHEST TIGHTNESS: 0
PHOTOPHOBIA: 0
ABDOMINAL PAIN: 0
ABDOMINAL DISTENTION: 0
VOMITING: 0
NAUSEA: 0
SHORTNESS OF BREATH: 0

## 2019-01-01 ASSESSMENT — PAIN DESCRIPTION - ORIENTATION
ORIENTATION: LOWER

## 2019-01-01 ASSESSMENT — PAIN SCALES - GENERAL
PAINLEVEL_OUTOF10: 0

## 2019-01-01 ASSESSMENT — PULMONARY FUNCTION TESTS
PIF_VALUE: 0
PIF_VALUE: 1

## 2019-01-01 ASSESSMENT — PAIN - FUNCTIONAL ASSESSMENT: PAIN_FUNCTIONAL_ASSESSMENT: 0-10

## 2019-01-01 ASSESSMENT — PAIN SCALES - WONG BAKER: WONGBAKER_NUMERICALRESPONSE: 4

## 2019-01-01 ASSESSMENT — PAIN DESCRIPTION - LOCATION
LOCATION: ABDOMEN

## 2019-01-01 ASSESSMENT — PAIN DESCRIPTION - DESCRIPTORS
DESCRIPTORS: DISCOMFORT

## 2019-01-01 ASSESSMENT — PAIN DESCRIPTION - PAIN TYPE
TYPE: SURGICAL PAIN
TYPE: ACUTE PAIN
TYPE: SURGICAL PAIN

## 2019-01-10 ENCOUNTER — HOSPITAL ENCOUNTER (OUTPATIENT)
Age: 84
Discharge: HOME | End: 2019-01-12
Payer: COMMERCIAL

## 2019-01-10 LAB
ALBUMIN SERPL-MCNC: 3.2 G/DL (ref 3.5–5.2)
ALP BLD-CCNC: 49 U/L (ref 35–104)
ALT SERPL-CCNC: 6 U/L (ref 0–32)
ANION GAP SERPL CALCULATED.3IONS-SCNC: 13 MMOL/L (ref 7–16)
AST SERPL-CCNC: 7 U/L (ref 0–31)
BILIRUB SERPL-MCNC: 0.2 MG/DL (ref 0–1.2)
BUN BLDV-MCNC: 17 MG/DL (ref 8–23)
CALCIUM SERPL-MCNC: 9 MG/DL (ref 8.6–10.2)
CHLORIDE BLD-SCNC: 106 MMOL/L (ref 98–107)
CO2: 24 MMOL/L (ref 22–29)
CREAT SERPL-MCNC: 1.1 MG/DL (ref 0.5–1)
FOLATE: 7.2 NG/ML (ref 4.8–24.2)
GFR AFRICAN AMERICAN: 57
GFR NON-AFRICAN AMERICAN: 47 ML/MIN/1.73
GLUCOSE BLD-MCNC: 91 MG/DL (ref 74–99)
HCT VFR BLD CALC: 21.9 % (ref 34–48)
HEMOGLOBIN: 6.7 G/DL (ref 11.5–15.5)
MCH RBC QN AUTO: 26.6 PG (ref 26–35)
MCHC RBC AUTO-ENTMCNC: 30.6 % (ref 32–34.5)
MCV RBC AUTO: 86.9 FL (ref 80–99.9)
PDW BLD-RTO: 13 FL (ref 11.5–15)
PLATELET # BLD: 146 E9/L (ref 130–450)
PMV BLD AUTO: 11.2 FL (ref 7–12)
POTASSIUM SERPL-SCNC: 4.1 MMOL/L (ref 3.5–5)
RBC # BLD: 2.52 E12/L (ref 3.5–5.5)
SODIUM BLD-SCNC: 143 MMOL/L (ref 132–146)
TOTAL PROTEIN: 5.4 G/DL (ref 6.4–8.3)
TSH SERPL DL<=0.05 MIU/L-ACNC: 1.77 UIU/ML (ref 0.27–4.2)
VITAMIN B-12: 1677 PG/ML (ref 211–946)
WBC # BLD: 5.2 E9/L (ref 4.5–11.5)

## 2019-01-10 PROCEDURE — 84443 ASSAY THYROID STIM HORMONE: CPT

## 2019-01-10 PROCEDURE — 85027 COMPLETE CBC AUTOMATED: CPT

## 2019-01-10 PROCEDURE — 80053 COMPREHEN METABOLIC PANEL: CPT

## 2019-01-10 PROCEDURE — 82607 VITAMIN B-12: CPT

## 2019-01-10 PROCEDURE — 36415 COLL VENOUS BLD VENIPUNCTURE: CPT

## 2019-01-10 PROCEDURE — 82746 ASSAY OF FOLIC ACID SERUM: CPT

## 2019-01-12 ENCOUNTER — HOSPITAL ENCOUNTER (OUTPATIENT)
Age: 84
Discharge: HOME | End: 2019-01-14
Payer: COMMERCIAL

## 2019-01-12 LAB
FERRITIN: 18 NG/ML
HCT VFR BLD CALC: 26.8 % (ref 34–48)
IMMATURE RETIC FRACT: 19.7 % (ref 3–15.9)
IRON SATURATION: 15 % (ref 15–50)
IRON: 41 MCG/DL (ref 37–145)
RETIC HGB EQUIVALENT: 26.7 PG (ref 28.2–36.6)
RETICULOCYTE ABSOLUTE COUNT: 0.08 E12/L
RETICULOCYTE COUNT PCT: 2.5 % (ref 0.4–1.9)
TOTAL IRON BINDING CAPACITY: 277 MCG/DL (ref 250–450)
TRANSFERRIN: 236 MG/DL (ref 200–360)

## 2019-01-12 PROCEDURE — 36415 COLL VENOUS BLD VENIPUNCTURE: CPT

## 2019-01-12 PROCEDURE — 85045 AUTOMATED RETICULOCYTE COUNT: CPT

## 2019-01-12 PROCEDURE — 84466 ASSAY OF TRANSFERRIN: CPT

## 2019-01-12 PROCEDURE — 83540 ASSAY OF IRON: CPT

## 2019-01-12 PROCEDURE — 82728 ASSAY OF FERRITIN: CPT

## 2019-01-12 PROCEDURE — 83550 IRON BINDING TEST: CPT

## 2019-01-14 ENCOUNTER — HOSPITAL ENCOUNTER (OUTPATIENT)
Age: 84
Discharge: HOME | End: 2019-01-16
Payer: COMMERCIAL

## 2019-01-14 LAB
ABO/RH: NORMAL
ANTIBODY SCREEN: NORMAL

## 2019-01-14 PROCEDURE — 36415 COLL VENOUS BLD VENIPUNCTURE: CPT

## 2019-01-14 PROCEDURE — 86923 COMPATIBILITY TEST ELECTRIC: CPT

## 2019-01-14 PROCEDURE — 86850 RBC ANTIBODY SCREEN: CPT

## 2019-01-14 PROCEDURE — 86901 BLOOD TYPING SEROLOGIC RH(D): CPT

## 2019-01-14 PROCEDURE — 86900 BLOOD TYPING SEROLOGIC ABO: CPT

## 2019-01-14 RX ORDER — SODIUM CHLORIDE 0.9 % (FLUSH) 0.9 %
10 SYRINGE (ML) INJECTION PRN
Status: CANCELLED | OUTPATIENT
Start: 2019-01-14

## 2019-01-15 ENCOUNTER — HOSPITAL ENCOUNTER (OUTPATIENT)
Dept: INFUSION THERAPY | Age: 84
Setting detail: INFUSION SERIES
Discharge: HOME | End: 2019-01-15
Payer: COMMERCIAL

## 2019-01-15 VITALS
SYSTOLIC BLOOD PRESSURE: 134 MMHG | RESPIRATION RATE: 18 BRPM | TEMPERATURE: 98.1 F | DIASTOLIC BLOOD PRESSURE: 55 MMHG | HEART RATE: 53 BPM

## 2019-01-15 LAB
BLOOD BANK DISPENSE STATUS: NORMAL
BLOOD BANK PRODUCT CODE: NORMAL
BPU ID: NORMAL
DESCRIPTION BLOOD BANK: NORMAL

## 2019-01-15 PROCEDURE — 2580000003 HC RX 258: Performed by: INTERNAL MEDICINE

## 2019-01-15 PROCEDURE — P9016 RBC LEUKOCYTES REDUCED: HCPCS

## 2019-01-15 PROCEDURE — 36430 TRANSFUSION BLD/BLD COMPNT: CPT

## 2019-01-15 RX ORDER — 0.9 % SODIUM CHLORIDE 0.9 %
250 INTRAVENOUS SOLUTION INTRAVENOUS ONCE
Status: COMPLETED | OUTPATIENT
Start: 2019-01-15 | End: 2019-01-15

## 2019-01-15 RX ORDER — SODIUM CHLORIDE 0.9 % (FLUSH) 0.9 %
SYRINGE (ML) INJECTION
Status: DISPENSED
Start: 2019-01-15 | End: 2019-01-15

## 2019-01-15 RX ORDER — SODIUM CHLORIDE 0.9 % (FLUSH) 0.9 %
10 SYRINGE (ML) INJECTION EVERY 12 HOURS
Status: DISCONTINUED | OUTPATIENT
Start: 2019-01-15 | End: 2019-01-16 | Stop reason: HOSPADM

## 2019-01-15 RX ADMIN — SODIUM CHLORIDE 250 ML: 9 INJECTION, SOLUTION INTRAVENOUS at 11:21

## 2019-05-23 PROBLEM — N39.0 UTI (URINARY TRACT INFECTION): Status: ACTIVE | Noted: 2019-01-01

## 2019-05-23 NOTE — ED PROVIDER NOTES
no wheezes. She exhibits no tenderness. Abdominal: Soft. Bowel sounds are normal. She exhibits no distension. There is no tenderness. There is no rebound and no guarding. Musculoskeletal: She exhibits edema (1+ edema LE). Neurological: She is alert. Oriented x 1   Skin: Skin is warm and dry. Capillary refill takes 2 to 3 seconds. Nursing note and vitals reviewed. Procedures    MDM  Number of Diagnoses or Management Options  Urinary tract infection associated with catheterization of urinary tract, unspecified indwelling urinary catheter type, initial encounter [facility]):   Diagnosis management comments: Good Ellsworth is an 80year old female who presented with fever, nausea. Patient had a hx of UTI, patient has a suprapubic catheter in place. UA is nitrite positive. Patient will be admitted for IV abx after failed tx with macrobid. Cultures pending. Patient's vitals stable while in ED. Patient's fever resolved with PO tylenol. Patient's nausea resolved with zofran. Patient was given 1L IVF. Patient treated with ceftriaxone in ED. Patient has PCN allergy but responded to rocephin for previous UTI. Patient has a history of CKD and peripheral edema. Patient was not given additional IVF at this time. Case discussed with Dr. Tanya Bundy, he will admit patient. Discussed plan with family. ED Course as of May 23 2218   Thu May 23, 2019   2133 ATTENDING PROVIDER ATTESTATION:     Good Ellsworth presented to the emergency department for evaluation of [unfilled] and was initially evaluated by the Medical Resident. See Original ED Note for H&P and ED course above. I have reviewed and discussed the case, including pertinent history (medical, surgical, family and social) and exam findings with the Medical Resident assigned to Good Ellsworth. I have personally performed and/or participated in the history, exam, medical decision making, and procedures and agree with all pertinent clinical information. Differential Diagnoses: UTI, Sepsis, Dehydration, Metabolic/Electrolyte Disorder, Acute Kidney Injury, to name a few. I have reviewed my findings and recommendations with the assigned Medical Resident, Garrett Moon and members of family present at the time of disposition. My findings/plan: [unfilled]  [unfilled]  Dorian Lima MD        [DD]      ED Course User Index  [DD] Jacinta Rosenbaum MD     SEPSIS EVALUATION TOOL Time of presentation: 2019  7:46 PM    SIRS CRITERIA: (2 required)  Temperature <36 or >38  Yes  Heart rate >90    No  RR >20 or PCO2 <32   No  WBC >12 or <4 or >10% bands No    SEPSIS CRITERIA: (Both required)  Two or more of the above  No  Suspected source(s) of infection UTI    ANTIBIOTIC SELECTION RATIONAL  Admitting physician or consultant recommendation    ANTIBIOTIC SELECTION LIMITATIONS  None, Allergy and Documented failure    LACTIC ACID    Initial     1.5  Follow up - if initial abnormal (>2) n/a    SEVERE SEPSIS CRITERIA: (All 3 of the following criteria needed)  1. SIRS Criteria met   No  2. Sepsis Criteria met   No  3. Organ dysfunction as evidenced by any one of the following No   A. Systolic QP<05 or MAP< 65 or SBP decrease by >40 from baseline   B. Creatinine >2.0, or urine output <0.5 mL/kg/hr for 2 hours   C. Bilirubin > 2 mg/dL   D. Platelet count < 527,154   E. INR > 1.5 or aPTT > 60 sec   F. Lactate > 2 mmol/L    Section requirements: To be done within 3 hours of presentation (1946 + 3 hours):  1. Initial lactate measured  2. Broad spectrum antibiotics administered  3. Blood cultures drawn prior to antibiotics  4. Repeat lactate if initial level >2 (6 hour requirement)    SEPTIC SHOCK CRITERIA: (Both of the following must be met)  1. Severe sepsis criteria met   No   AND either of the followin. Lactate > 4 mmol/ L on any reading No    OR      Tissue hypoperfusion after crystalloids as evidenced by either: No   A.  SBP <90 or MAP <65                        Or   B. Decrease in SBP by > 40 points from baseline         Section requirements: To be done within 3 hours of presentation (registration(1946 + 3 hours):  1. Requirements as in Severe Sepsis  2. Resuscitation with 30 ml/kg crystalloid fluid. Maintain MAP >65  3. Vasopressors if hypotension persists (6 hour requirement)  4. Repeat volume status and tissue perfusion assessment  ([facility]epsisReEvaluation)     FLUIDS  Saline 30 ml/kg given (over 30 min) No    FLUID ADMINISTRATION BARRIERS  None, Poor IV access and Significant active pulmonary edema or CHF     OTHER TREATMENT BARRIERS  None, DNR Status and Patient or family refusal    CENTRAL LINE INSERTED? No        ED Course as of May 23 2253   Thu May 23, 2019   2133 ATTENDING PROVIDER ATTESTATION:     Paulina Herrera presented to the emergency department for evaluation of [unfilled] and was initially evaluated by the Medical Resident. See Original ED Note for H&P and ED course above. I have reviewed and discussed the case, including pertinent history (medical, surgical, family and social) and exam findings with the Medical Resident assigned to Paulina Herrera. I have personally performed and/or participated in the history, exam, medical decision making, and procedures and agree with all pertinent clinical information. Differential Diagnoses: UTI, Sepsis, Dehydration, Metabolic/Electrolyte Disorder, Acute Kidney Injury, to name a few. I have reviewed my findings and recommendations with the assigned Medical Resident, Paulina Herrera and members of family present at the time of disposition.     My findings/plan: [unfilled]  [unfilled]  Teo Quinn MD        [DD]      ED Course User Index  [DD] Josephine [place], MD       --------------------------------------------- PAST HISTORY ---------------------------------------------  Past Medical History:  has a past medical history of Alzheimer disease, Arthritis, Blood circulation, collateral, Cerebrovascular accident (CVA) determined by clinical assessment ([facility] Chronic kidney disease, Chronic kidney disease, unspecified, Difficulty walking, Dysphagia, Generalized anxiety disorder, GERD (gastroesophageal reflux disease), GERD (gastroesophageal reflux disease), Hemiplegia and hemiparesis following cerebral infarction affecting left non-dominant side ([facility] [place] [address] Hyperlipidemia, Hypertension, Insomnia, Iron deficiency anemia, Major depressive disorder, recurrent episode, severe, with psychosis ([facility] [place] [address] Moderate single current episode of major depressive disorder ([facility] [place] [address] Movement disorder, Muscle wasting and atrophy, not elsewhere classified, multiple sites, Other forms of scoliosis, thoracic region, Other specified forms of tremor, Psychiatric problem, PVD (peripheral vascular disease) ([facility] [place] [address] Restless leg syndrome, Unspecified dementia with behavioral disturbance, Urinary retention, and UTI (urinary tract infection). Past Surgical History:  has a past surgical history that includes Hysterectomy; Spine surgery; Appendectomy; Bladder surgery; back surgery; Colonoscopy; eye surgery; Endoscopy, colon, diagnostic; and Cystoscopy (N/A, 3/15/2019). Social History:  reports that she has never smoked. She has never used smokeless tobacco. She reports that she does not drink alcohol or use drugs. Family History: family history is not on file. The patients home medications have been reviewed.     Allergies: Penicillins    -------------------------------------------------- RESULTS -------------------------------------------------    LABS:  Results for orders placed or performed during the hospital encounter of 05/23/19   CBC auto differential   Result Value Ref Range    WBC 7.4 4.5 - 11.5 E9/L    RBC 3.71 3.50 - 5.50 E12/L    Hemoglobin 10.2 (L) 11.5 - 15.5 g/dL    Hematocrit 32.0 (L) 34.0 - 48.0 %    MCV 86.3 80.0 - 99.9 fL    MCH 27.5 26.0 - 35.0 pg    MCHC 31.9 (L) 32.0 - 34.5 %    RDW 14.4 11.5 - 15.0 fL    Platelets 115 (L) 310 - 450 E9/L MPV 11.3 7.0 - 12.0 fL    Neutrophils % 83.6 (H) 43.0 - 80.0 %    Immature Granulocytes % 0.4 0.0 - 5.0 %    Lymphocytes % 6.9 (L) 20.0 - 42.0 %    Monocytes % 6.0 2.0 - 12.0 %    Eosinophils % 2.8 0.0 - 6.0 %    Basophils % 0.3 0.0 - 2.0 %    Neutrophils # 6.16 1.80 - 7.30 E9/L    Immature Granulocytes # 0.03 E9/L    Lymphocytes # 0.51 (L) 1.50 - 4.00 E9/L    Monocytes # 0.44 0.10 - 0.95 E9/L    Eosinophils # 0.21 0.05 - 0.50 E9/L    Basophils # 0.02 0.00 - 0.20 E9/L    Poikilocytes 1+     Ovalocytes 1+    Comprehensive Metabolic Panel w/ Reflex to MG   Result Value Ref Range    Sodium 135 132 - 146 mmol/L    Potassium reflex Magnesium 4.0 3.5 - 5.0 mmol/L    Chloride 99 98 - 107 mmol/L    CO2 26 22 - 29 mmol/L    Anion Gap 10 7 - 16 mmol/L    Glucose 133 (H) 74 - 99 mg/dL    BUN 17 8 - 23 mg/dL    CREATININE 1.2 (H) 0.5 - 1.0 mg/dL    GFR Non-African American 42 >=60 mL/min/1.73    GFR African American 51     Calcium 9.1 8.6 - 10.2 mg/dL    Total Protein 6.3 (L) 6.4 - 8.3 g/dL    Alb 3.6 3.5 - 5.2 g/dL    Total Bilirubin 0.5 0.0 - 1.2 mg/dL    Alkaline Phosphatase 68 35 - 104 U/L    ALT 9 0 - 32 U/L    AST 9 0 - 31 U/L   Troponin   Result Value Ref Range    Troponin <0.01 0.00 - 0.03 ng/mL   Lactic acid, plasma   Result Value Ref Range    Lactic Acid 1.5 0.5 - 2.2 mmol/L   Lipase   Result Value Ref Range    Lipase 14 13 - 60 U/L   Urinalysis with Microscopic   Result Value Ref Range    Color, UA DARK YELLOW (A) Straw/Yellow    Clarity, UA Clear Clear    Glucose, Ur Negative Negative mg/dL    Bilirubin Urine Negative Negative    Ketones, Urine TRACE (A) Negative mg/dL    Specific Gravity, UA 1.015 1.005 - 1.030    Blood, Urine MODERATE (A) Negative    pH, UA 5.5 5.0 - 9.0    Protein, UA 30 (A) Negative mg/dL    Urobilinogen, Urine 0.2 <2.0 E.U./dL    Nitrite, Urine POSITIVE (A) Negative    Leukocyte Esterase, Urine TRACE (A) Negative   Brain Natriuretic Peptide   Result Value Ref Range    Pro- 0 - 450 answered at this time and they are agreeable with the plan of admission.    --------------------------------- ADDITIONAL PROVIDER NOTES ---------------------------------  Consultations:  Time: 1050pm. Spoke with Dr. Teresa Castellanos. Discussed case. They will admit the patient. This patient's ED course included: a personal history and physicial examination, re-evaluation prior to disposition, multiple bedside re-evaluations and IV medications    This patient has remained hemodynamically stable during their ED course. Diagnosis:  1. Urinary tract infection associated with catheterization of urinary tract, unspecified indwelling urinary catheter type, initial encounter [facility])        Disposition:  Patient's disposition: Admit to med/surg floor  Patient's condition is stable.               Valery Pacheco MD  05/24/19 1987

## 2019-05-24 PROBLEM — I69.398 NEUROGENIC BLADDER AS LATE EFFECT OF CEREBROVASCULAR ACCIDENT (CVA): Status: ACTIVE | Noted: 2019-01-01

## 2019-05-24 PROBLEM — N31.9 NEUROGENIC BLADDER AS LATE EFFECT OF CEREBROVASCULAR ACCIDENT (CVA): Status: ACTIVE | Noted: 2019-01-01

## 2019-05-24 PROBLEM — N18.30 CKD (CHRONIC KIDNEY DISEASE) STAGE 3, GFR 30-59 ML/MIN (HCC): Chronic | Status: RESOLVED | Noted: 2017-02-22 | Resolved: 2019-01-01

## 2019-05-24 PROBLEM — N39.0 UTI (URINARY TRACT INFECTION): Status: RESOLVED | Noted: 2019-01-01 | Resolved: 2019-01-01

## 2019-05-24 PROBLEM — D69.6 THROMBOCYTOPENIA (HCC): Status: ACTIVE | Noted: 2019-01-01

## 2019-05-24 NOTE — PROGRESS NOTES
Occupational Therapy  OCCUPATIONAL THERAPY INITIAL EVALUATION      Date:2019  Patient Name: Garrett Moon  MRN: [id number]  : 9/15/1929  Room: [address] [place], [place] [address]        Evaluating OT: Mesfin Swain OTR/L [id number]    AM-PAC Daily Activity Raw Score:     Recommended Adaptive Equipment: TBD     Comments: Based on patient's functional performance as documented below and prior level of function, patient would benefit from continued skilled OT during/following hospital stay in an effort to increase functional safety, independence with ADLS/IADLS, and overall quality of life    Diagnosis: UTI    Pertinent Medical History: Alzheimer disease, CVA L side hemiparesis,   Anxiety disorder, PVD  Precautions:  Falls, alarm, Isolation, , [place]     Home Living: Pt lives at SNF per chart. Unclear on PLOF - patient poor historian       Pain Level: no pain ;   Cognition:  Oriented to person only, alert and conversing     Judgement/safety:  Fair-     Functional Assessment:   Initial Eval Status  Date: 19 Tx Session  Date:  Short Term Goals  Treatment frequency: PRN   Feeding Min A     Grooming Mod A,seated   Min A   UB Dressing Max A  Mod A    LB Dressing Dependent   Donning socks   Max A    Bathing      Toileting      Bed Mobility  Max A x2  Supine <> sit      Functional Transfers Mod A x2  Sit-stand from bed   Mod A    Functional Mobility Attempting side step   Patient returned to bed   L side  weakness noted, knee buckling, decrease sitting balance    Mod A at w/c level    Balance Sitting:     Static:  Min A    Standing:  Max A x2     Activity Tolerance Fair-  Good with ADL activity    Visual/  Perceptual Glasses: reading                    Strength ROM Additional Info:    RUE  3+/5  WFL good  and wfl FMC/dexterity noted during ADL tasks       LUE  L UE hemiparesis   Gross AROM noted distally   Poor  strength         Hearing: [place]  Sensation:  No c/o numbness or tingling Comments/Treatment: Upon arrival, patient lying in bed . At end of session, patient sitting in bed chair position  with call light and phone within reach, all lines and tubes intact. Pt would benefit from continued skilled OT to increase safety and independence with completion of ADL/IADL tasks for functional independence and quality of life. ALARM ON.  present in room     Eval Complexity: Low    Assessment of current deficits   Functional mobility [x]  ADLs [x] Strength [x]  Cognition [x]  Functional transfers  [x] IADLs [x] Safety Awareness [x]  Endurance [x]  Fine Motor Coordination [] Balance [x] Vision/perception [] Sensation []   Gross Motor Coordination [] ROM [x] Delirium []                  Motor Control []    Plan of Care:   ADL retraining [x]   Equipment needs [x]   Neuromuscular re-education [x] Energy Conservation Techniques [x]  Functional Transfer training [x] Patient and/or Family Education [x]  Functional Mobility training [x]  Environmental Modifications [x]  Cognitive re-training []   Compensatory techniques for ADLs [x]  Splinting Needs []   Positioning to improve overall function [x]   Therapeutic Activity [x]  Therapeutic Exercise  [x]  Visual/Perceptual: []    Delirium prevention/treatment  []   Other:  []    Rehab Potential: Good for established goals     Patient / Family Goal: no family present , patient goal to call her son     Patient and/or family were instructed on functional diagnosis, prognosis/goals and OT plan of care. Demonstrated poor understanding. · Mod  Complexity  · History: Expanded review of medical records and additional review of physical, cognitive, or psychosocial history related to current functional performance  · Exam: 3+ performance deficits  · Assistance/Modification: Max assistance or modifications required to perform tasks. May have comorbidities that affect occupational performance.        Evaluation    Evaluation time includes thorough

## 2019-05-24 NOTE — PROGRESS NOTES
Priscilla Doyle was ordered linaclotide [facility]) which is a nonformulary medication. The patient has indicated that the home supply of this medication will be brought in to the hospital for inpatient use. If the medication has not been administered by 1400 on the following day from the time the order was placed, a pharmacist will follow-up with the nurse of the patient to assess the capability of the patient to bring in the medication. If it is determined that the patient cannot supply the medication and it is not available to be dispensed from the pharmacy, a call will be placed to the ordering provider to discuss alternative options.     Lexa Catherine [facility]  5/24/2019  1:05 AM

## 2019-05-24 NOTE — H&P
History and Physical      CHIEF COMPLAINT: Nausea, fevers chills      History of Present Illness: 49-year-old female patient of Dr. Jennifer Zarate I'm asked to admit and follow. Patient has a known neurogenic bladder; she had suprapubic catheter inserted 3/2019. The history I received she had been receiving intravenous antibiotics at her nursing home/facility. That was recently stopped and she was placed on Macrobid for UTI. She arrived by EMS; recent onset of nausea emesis and temperature of 102°F. In the ED temperature 101.1 Fahrenheit. 96% saturation on nasal cannula. Unknown flow rate. In the ED WBC 7.4 urinalysis revealed trace leukocyte Estrace positive nitrites 10-20 red cells 5-10 white cells and moderate bacteria. She is admitted for further evaluation and treatment of catheter associated UTI. In the ED she was given 1 dose of ceftriaxone; patient has listed penicillin as an allergy. She is currently on Levaquin. Chest x-ray in the ED was unremarkable. Today sodium 139 potassium 3.9 chloride 101 CO2 24 BUN 13 creatinine 1.1 magnesium 1.7 glucose 114 calcium 8.4 phosphorus 1.9 protein 6.0 albumin 3.3 protein calcitonin 0.25. LDL 37. A1c 5.4 TSH 2.04 WBC 6.9 hemoglobin 9.4 platelets 47,879. 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         Past Medical History:   Diagnosis Date    Alzheimer disease     Arthritis     Blood circulation, collateral     [facility]     Cerebrovascular accident (CVA) determined by clinical assessment ([facility] 07/07/2018    Right periventricular    CKD (chronic kidney disease) stage 3, GFR 30-59 ml/min (HCC)     Difficulty walking     Dysphagia     Generalized anxiety disorder     GERD (gastroesophageal reflux disease)     Hemiplegia and hemiparesis following cerebral infarction affecting left non-dominant side (HCC)     Hyperlipidemia     Hypertension     Insomnia  Iron deficiency anemia     Major depressive disorder, recurrent episode, severe, with psychosis [facility]     Moderate single current episode of major depressive disorder ([facility] 3/3/2017    Movement disorder     Muscle wasting and atrophy, not elsewhere classified, multiple sites     Neurogenic bladder as late effect of cerebrovascular accident (CVA) 03/2019    Other forms of scoliosis, thoracic region     Other specified forms of tremor     Psychiatric problem     PVD (peripheral vascular disease) ([facility]     Restless leg syndrome     Unspecified dementia with behavioral disturbance     Urinary retention     UTI (urinary tract infection) 2/22/2017         Past Surgical History:   Procedure Laterality Date    APPENDECTOMY      BACK SURGERY      BLADDER SURGERY      COLONOSCOPY      CYSTOSCOPY N/A 3/15/2019    CYSTOSCOPY SUPRAPUBIC TUBE PLACEMENT performed by [facility]ureen [facility]ed DO at [facility] OR    ENDOSCOPY, COLON, DIAGNOSTIC      EYE SURGERY      HYSTERECTOMY      SPINE SURGERY         Medications Prior to Admission:    Medications Prior to Admission: phenazopyridine (PYRIDIUM) 100 MG tablet, Take 100 mg by mouth daily  nitrofurantoin, macrocrystal-monohydrate, (MACROBID) 100 MG capsule, Take 100 mg by mouth 2 times daily  donepezil (ARICEPT) 10 MG tablet, TAKE 1 TABLET BY MOUTH DAILY AT BEDTIME  Pantoprazole Sodium (PROTONIX PO), Take 40 mg by mouth daily   busPIRone (BUSPAR) 10 MG tablet, Take 10 mg by mouth 3 times daily Take am dos 03/15  atorvastatin (LIPITOR) 40 MG tablet, Take 1 tablet by mouth nightly  lisinopril (PRINIVIL;ZESTRIL) 40 MG tablet, Take 1 tablet by mouth daily  amLODIPine (NORVASC) 10 MG tablet, Take 1 tablet by mouth daily (Patient taking differently: Take 10 mg by mouth daily Take am dos)  acetaminophen (TYLENOL) 325 MG tablet, Take 650 mg by mouth every 4 hours as needed for Pain or Fever  LINZESS 290 MCG CAPS capsule, Take 290 mcg by mouth daily  Cholecalciferol (VITAMIN D3) 5000 units CAPS, Take 5,000 Units by mouth daily  vitamin B-12 (CYANOCOBALAMIN) 100 MCG tablet, Take 1,000 mcg by mouth daily  mirtazapine (REMERON) 7.5 MG tablet, Take 1 tablet by mouth nightly  sucralfate (CARAFATE) 1 GM tablet, Take 1 tablet by mouth 4 times daily (with meals and nightly)  aspirin 81 MG EC tablet, Take 81 mg by mouth daily. methyl salicylate-menthol (NANDO BRASHER GREASELESS) 10-15 % CREA, Apply topically 3 times daily as needed for Pain Apply to back  Sodium Phosphates (FLEET) 7-19 GM/118ML, Place 1 enema rectally daily as needed  Throat Lozenges (CEPACOL MT), Take 1 lozenge by mouth every 2 hours as needed   magnesium hydroxide (MILK OF MAGNESIA) 400 MG/5ML suspension, Take by mouth daily as needed for Constipation  hypromellose 0.4 % SOLN ophthalmic solution, Place 1 drop into both eyes every 6 hours as needed  bisacodyl (DULCOLAX) 10 MG suppository, Place 10 mg rectally daily as needed for Constipation  simethicone (MYLICON) 80 MG chewable tablet, Take 1 tablet by mouth 4 times daily as needed for Flatulence    Allergies:    Penicillins    Social History:    reports that she has never smoked. She has never used smokeless tobacco. She reports that she does not drink alcohol or use drugs. Family History:   family history is not on file. REVIEW OF SYSTEMS:  As above in the HPI, otherwise negative    PHYSICAL EXAM:    VS: BP (!) 146/64   Pulse 60   Temp 98.4 °F (36.9 °C) (Oral)   Resp 18   Ht 5' (1.524 m)   Wt 162 lb 9.6 oz (73.8 kg)   SpO2 96%   BMI 31.76 kg/m²     General appearance: Alert, Awake, Oriented times 3, no distress  Skin: Warm and dry ; no rashes  Head: Normocephalic. No masses, lesions or tenderness noted  Eyes: Conjunctivae pale, sclera white. PERRL,EOM-I  Ears: External ears normal  Nose/Sinuses: Nares normal. Septum midline. Mucosa normal. No drainage  Oropharynx: Oropharynx clear with no exudate seen  Neck: Supple.  No jugular venous distension, lymphadenopathy or Component Value Date    TSH 2.040 05/24/2019     VITAMIN B12: No components found for: B12  FOLATE:    Lab Results   Component Value Date    FOLATE 7.2 01/10/2019     LIPASE:    Lab Results   Component Value Date    LIPASE 14 05/23/2019       RADIOLOGY:  XR CHEST PORTABLE   Final Result      Lines, tubes, and devices:  None. Lungs and pleura: Increased reticular markings bilaterally in   conjunction with perihilar prominence suggests vascular congestion   and/or interstitial edema. Small bilateral pleural effusions are   suspected. No dense airspace consolidation. No pneumothorax. Cardiomediastinal silhouette:  Unchanged enlarged cardiomediastinal   silhouette. Other:  Bony structures are unremarkable. ASSESSMENT:      Active Hospital Problems    Diagnosis    RLS (restless legs syndrome) [G25.81]     Priority: Low    PVD (peripheral vascular disease) ([facility]) [I73.9]    CKD (chronic kidney disease) stage 3, GFR 30-59 ml/min ([facility]) [N18.3]    Catheter-associated urinary tract infection ([facility]) [T83.511A, N39.0]    Alzheimer disease [G30.9, F02.80]    Neurogenic bladder as late effect of cerebrovascular accident (CVA) Dolf.Matt, N31.9]    GERD (gastroesophageal reflux disease) [K21.9]    HTN (hypertension) [I10]    Hyperlipidemia [E78.5]       PLAN:  Medications discussed with patient  GI prophylaxis  DVT prophylaxis  Levaquin 500 mg IV every 48 hours  I reviewed the chart extensively, I cannot find any record of a 2-D echo being done on this patient.   2-D echo  Potassium phosphate IV  Calcium gluconate IV  Monitor labs  Continue previous medications as best possible              Please note that over 50 minutes was spent in evaluating the patient, review of records and results, discussion with staff/family, etc.    Pau Lutz DO  1:15 PM  5/24/2019    Voice recognition software use for dictation

## 2019-05-24 NOTE — CARE COORDINATION
Social Work:    Armaan Melgar in admission at [address] [facility] advised that Mrs. Elner Boast may possibly return skilled level of care. Social work attempted to meet with Mrs. Elner Boast but she was slightly confused. A voice message was left with her son to confirm return status, however, patient resides at [address] [place] [address].     Electronically signed by ASAF Carmona on 5/24/2019 at 1:45 PM

## 2019-05-24 NOTE — PROGRESS NOTES
demonstrated skill Pt requires further education in this area   yes Yes with assist yes     Rehab potential is Good for reaching above PT goals. Pts/ family goals   1. To talk to her son. Patient and or family understand(s) diagnosis, prognosis, and plan of care. PLAN  PT care will be provided in accordance with the objectives noted above. Whenever appropriate, clear delegation orders will be provided for nursing staff. Exercises and functional mobility practice will be used as well as appropriate assistive devices or modalities to obtain goals. Patient and family education will also be administered as needed. Frequency of treatments will be 2-5x/week x 7-10 days. Time in: 10:05  Time out: 10:35    Alejandro Flowers., P.T.    License number:  [id number]

## 2019-05-24 NOTE — DISCHARGE INSTR - COC
Continuity of Care Form    Patient Name: Good Ellsworth   :  9/15/1929  MRN:  [id number]    Admit date:  2019  Discharge date:  19      Code Status Order: Prior   Advance Directives:   [facility] Documentation     Date/Time Healthcare Directive Type of Healthcare Directive Copy in [facility] [address] Agent's Name Healthcare Agent's Phone Number    [phone]  Yes, patient has an advance directive for healthcare treatment  Health care treatment directive  Yes, copy in chart  --  --  --          Admitting Physician:  Toni Lester DO  PCP: Jason Loving MD    Discharging Nurse: Electronically signed by Stephanie Weinstein RN on 2019 at 5:19 PM    [address] Unit/Room#: 5366/0764-N  Discharging Unit Phone Number: [phone]      Emergency Contact:   Extended Emergency Contact Information  Primary Emergency Contact: [place]  Address: [address] [place] [address] Phone: [phone]  Work Phone: [phone]  Mobile Phone: [phone]  Relation: Child    Past Surgical History:  Past Surgical History:   Procedure Laterality Date    APPENDECTOMY      BACK SURGERY      BLADDER SURGERY      COLONOSCOPY      CYSTOSCOPY N/A 3/15/2019    CYSTOSCOPY SUPRAPUBIC TUBE PLACEMENT performed by [facility]ik [facility]ed DO at [facility] OR    ENDOSCOPY, COLON, DIAGNOSTIC      EYE SURGERY      HYSTERECTOMY      SPINE SURGERY         Immunization History:   Immunization History   Administered Date(s) Administered    Influenza Virus Vaccine 10/12/2014       Active Problems:  Patient Active Problem List   Diagnosis Code    GERD (gastroesophageal reflux disease) K21.9    HTN (hypertension) I10    Hyperlipidemia E78.5    Chronic back pain M54.9, G89.29    Chronic constipation K59.09    RLS (restless legs syndrome) G25.81    Moderate single current episode of major depressive disorder ([facility] F32.1    Cerebrovascular accident (CVA) determined by clinical assessment ([facility] I63.9    PVD (peripheral vascular disease) ([facility] I73.9    CKD (chronic kidney disease) stage 3, GFR 30-59 ml/min ([facility] N18.3    Catheter-associated urinary tract infection ([facility]) T83.511A, N39.0    Alzheimer disease G30.9, F02.80    Neurogenic bladder as late effect of cerebrovascular accident (CVA) I69.998, N31.9    Thrombocytopenia ([facility]) D69.6       Isolation/Infection:   Isolation          Contact        Patient Infection Status     Infection Encounter Level? Onset Date Added Added By Resolved Resolved By Review Date    ESBL (Extended Spectrum Beta Lactamase) No 05/18/19 05/21/19 Urine Culture       E Coli Urine 5/18/19          Nurse Assessment:  Last Vital Signs: BP (!) 146/64   Pulse 60   Temp 98.4 °F (36.9 °C) (Oral)   Resp 18   Ht 5' (1.524 m)   Wt 162 lb 9.6 oz (73.8 kg)   SpO2 96%   BMI 31.76 kg/m²     Last documented pain score (0-10 scale): Pain Level: 0  Last Weight:   Wt Readings from Last 1 Encounters:   05/24/19 162 lb 9.6 oz (73.8 kg)     Mental Status:  alert and coherent    IV Access:  - None    Nursing Mobility/ADLs:  Walking   Dependent  Transfer  Dependent  Bathing  Dependent  Dressing  Dependent  Toileting  Dependent  Feeding  Independent  Med Admin  Assisted  Med Delivery   crushed    Wound Care Documentation and Therapy:        Elimination:  Continence:   · Bowel: Yes  · Bladder: ***  Urinary Catheter: Insertion Date: Prior to admission-- Suprapubic catheter   Colostomy/Ileostomy/Ileal Conduit: No       Date of Last BM: 5/26/19      Intake/Output Summary (Last 24 hours) at 5/24/2019 1346  Last data filed at 5/24/2019 1230  Gross per 24 hour   Intake 1174 ml   Output 350 ml   Net 824 ml     I/O last 3 completed shifts:   In: 1054 [I.V.:1054]  Out: 350 [Urine:350]    Safety Concerns:     History of Falls (last 30 days), At Risk for Falls and Aspiration Risk    Impairments/Disabilities:      Hearing    Nutrition Therapy:  Current Nutrition Therapy:   - Oral Diet:  General    Routes of Feeding: Oral  Liquids: No Restrictions  Daily Fluid Restriction: no  Last Modified Barium Swallow with Video (Video Swallowing Test): not done    Treatments at the Time of Hospital Discharge:   Respiratory Treatments:   Oxygen Therapy:  is on oxygen at 4 L/min per nasal cannula. Ventilator:    - No ventilator support    Rehab Therapies: Physical Therapy and Occupational Therapy  Weight Bearing Status/Restrictions: No weight bearing restirctions  Other Medical Equipment (for information only, NOT a DME order):  hospital bed  Other Treatments:     Patient's personal belongings (please select all that are sent with patient):  None    RN SIGNATURE:  Electronically signed by Atif Vargas RN on 5/26/2019 at 5:23 PM      CASE MANAGEMENT/SOCIAL WORK SECTION    Inpatient Status Date: ***    Readmission Risk Assessment Score:  Readmission Risk              Risk of Unplanned Readmission:        20           Discharging to Facility/ Agency   · Name: [facility]  · Address:  [address] [place] [address]   · Phone: [phone]  · Fax: [phone]    Dialysis Facility (if applicable)   · Name:  · Address:  · Dialysis Schedule:  · Phone:  · Fax:    / signature: Electronically signed by ASAF Perez on 5/24/2019 at 1:49 PM    PHYSICIAN SECTION    Prognosis: Fair    Condition at Discharge: Stable    Rehab Potential (if transferring to Rehab): Good    Recommended Labs or Other Treatments After Discharge:     Physician Certification: I certify the above information and transfer of Andrzej Gomez  is necessary for the continuing treatment of the diagnosis listed and that she requires [facility] for less 30 days.      Update Admission H&P:   ASSESSMENT:   Principal Problem:    Catheter-associated urinary tract infection (HCC)  Active Problems:    RLS (restless legs syndrome)    GERD (gastroesophageal reflux disease)    HTN (hypertension) Hyperlipidemia    PVD (peripheral vascular disease) (HCC)    CKD (chronic kidney disease) stage 3, GFR 30-59 ml/min (HCC)    Alzheimer disease    Neurogenic bladder as late effect of cerebrovascular accident (CVA)    Thrombocytopenia ([facility]    PHYSICIAN SIGNATURE:  Electronically signed by Timothy Carpenter DO on 5/26/19 at 10:04 AM

## 2019-05-25 PROBLEM — A49.9 ESBL (EXTENDED SPECTRUM BETA-LACTAMASE) PRODUCING BACTERIA INFECTION: Status: ACTIVE | Noted: 2019-01-01

## 2019-05-25 PROBLEM — Z16.12 ESBL (EXTENDED SPECTRUM BETA-LACTAMASE) PRODUCING BACTERIA INFECTION: Status: ACTIVE | Noted: 2019-01-01

## 2019-05-25 NOTE — PROGRESS NOTES
3, no distress  Skin: Warm and dry ; no rashes  Head: Normocephalic. No masses, lesions or tenderness noted  Eyes: Conjunctivae pale, sclera white. PERRL,EOM-I  Ears: External ears normal  Nose/Sinuses: Nares normal. Septum midline. Mucosa normal. No drainage  Oropharynx: Oropharynx clear with no exudate seen  Neck: Supple. No jugular venous distension, lymphadenopathy or thyromegaly Trachea midline  Lungs: Clear to auscultation bilaterally. No rhonchi, crackles or wheezes  Heart: S1 S2  Regular rate and rhythm. No rub or gallop; grade 1/6 systolic murmur 2nd right intercostal space  Abdomen: Soft, non-tender. BS normal. No masses, organomegaly; no rebound or guarding; suprapubic catheter in place  Extremities: No edema, Peripheral pulses palpable  Musculoskeletal; weakness left upper extremity compared to right  Neuro:  Cranial nerves II through XII intact; weakness left upper extremity compared to right    TELEMETRY: REVIEWED--Telemetry: Sinus    ASSESSMENT:   Principal Problem:    Catheter-associated urinary tract infection (HCC)  Active Problems:    RLS (restless legs syndrome)    GERD (gastroesophageal reflux disease)    HTN (hypertension)    Hyperlipidemia    PVD (peripheral vascular disease) (HCC)    CKD (chronic kidney disease) stage 3, GFR 30-59 ml/min (HCC)    Alzheimer disease    Neurogenic bladder as late effect of cerebrovascular accident (CVA)    Thrombocytopenia (HCC)  Resolved Problems:    UTI (urinary tract infection)      PLAN:  SEE ORDERS      RE  CHANGES AND FINDINGS   Medications reviewed with patient  GI prophylaxis  DVT prophylaxis  Consultants notes reviewed   Stop Levaquin IV in view of recent positive ESBL blood cultures  ID consult  Meropenem 1 g every 8 hours IV until seen by ID  Monitor labs      Discussed with patient and nursing.       Gaby Loyola DO     11:30 AM     5/25/2019    TIME > 35  MINUTES    >  50 %  OF  TIME  DISCUSSION     Active Hospital Problems    Diagnosis  RLS (restless legs syndrome) [G25.81]     Priority: Low    Thrombocytopenia (HCC) [D69.6]    PVD (peripheral vascular disease) (HCC) [I73.9]    CKD (chronic kidney disease) stage 3, GFR 30-59 ml/min (HCC) [N18.3]    Catheter-associated urinary tract infection (HCC) [T83.511A, N39.0]    Alzheimer disease [G30.9, F02.80]    Neurogenic bladder as late effect of cerebrovascular accident (CVA) Stalin, N31.9]    GERD (gastroesophageal reflux disease) [K21.9]    HTN (hypertension) [I10]    Hyperlipidemia [E78.5]                 ------------  INFORMATION  -----------      DIET:DIET GENERAL;        Allergies   Allergen Reactions    Penicillins          MEDICATION SIDE EFFECTS:none       SCHEDULED MEDS:                                 Current Facility-Administered Medications   Medication Dose Route Frequency Provider Last Rate Last Dose    amLODIPine (NORVASC) tablet 10 mg  10 mg Oral Daily Loc A Mihok, DO   10 mg at 05/25/19 0929    aspirin EC tablet 81 mg  81 mg Oral Daily Loc A Mihok, DO   81 mg at 05/25/19 4136    atorvastatin (LIPITOR) tablet 40 mg  40 mg Oral Nightly Loc A Mihok, DO   40 mg at 05/24/19 2038    bisacodyl (DULCOLAX) suppository 10 mg  10 mg Rectal Daily PRN Geralynn Bulla Mihok, DO        busPIRone (BUSPAR) tablet 10 mg  10 mg Oral TID Geralynn Bulla Mihok, DO   10 mg at 05/25/19 5123    vitamin B-12 (CYANOCOBALAMIN) tablet 1,000 mcg  1,000 mcg Oral Daily Loc A Mihok, DO   1,000 mcg at 05/25/19 0928    sucralfate (CARAFATE) tablet 1 g  1 g Oral 4x Daily WC Loc A Mihok, DO   1 g at 05/25/19 2401    simethicone (MYLICON) chewable tablet 80 mg  80 mg Oral 4x Daily PRN Anthony Flax A Mihok, DO        pantoprazole (PROTONIX) tablet 40 mg  40 mg Oral QAM AC Loc A Mihok, DO   40 mg at 05/24/19 0657    mirtazapine (REMERON) tablet 7.5 mg  7.5 mg Oral Nightly Loc Loyola DO   7.5 mg at 05/24/19 2038    magnesium hydroxide (MILK OF MAGNESIA) 400 MG/5ML suspension 30 mL  30 mL Oral Daily PRN Sena Loyola, DO        lisinopril (PRINIVIL;ZESTRIL) tablet 40 mg  40 mg Oral Daily Loc Loyola DO   40 mg at 19 1055    linaclotide (LINZESS) capsule 290 mcg  290 mcg Oral Daily Loc Loyola DO        donepezil (ARICEPT) tablet 10 mg  10 mg Oral Nightly Loc Loyola, DO   10 mg at 19 2038    vitamin D (CHOLECALCIFEROL) tablet 5,000 Units  5,000 Units Oral Daily Sena Loyola, DO   5,000 Units at 19 0928    0.9 % sodium chloride infusion   Intravenous Continuous Loc Loyola  mL/hr at 19 0205      acetaminophen (TYLENOL) tablet 650 mg  650 mg Oral Q4H PRN Sena Loyola,         levofloxacin (LEVAQUIN) 500 MG/100ML infusion 500 mg  500 mg Intravenous Q48H Loc Loyola DO   Stopped at 19 0300    polyvinyl alcohol (LIQUIFILM TEARS) 1.4 % ophthalmic solution 1 drop  1 drop Both Eyes Q6H PRN Sena Loyola,         enoxaparin (LOVENOX) injection 30 mg  30 mg Subcutaneous Daily Loc Loyola DO   30 mg at 19 5192     Scheduled Meds:   amLODIPine  10 mg Oral Daily    aspirin  81 mg Oral Daily    atorvastatin  40 mg Oral Nightly    busPIRone  10 mg Oral TID    vitamin B-12  1,000 mcg Oral Daily    sucralfate  1 g Oral 4x Daily WC    pantoprazole  40 mg Oral QAM AC    mirtazapine  7.5 mg Oral Nightly    lisinopril  40 mg Oral Daily    linaclotide  290 mcg Oral Daily    donepezil  10 mg Oral Nightly    vitamin D  5,000 Units Oral Daily    levofloxacin  500 mg Intravenous Q48H    enoxaparin  30 mg Subcutaneous Daily       Continuous Infusions:   sodium chloride 100 mL/hr at 19 0205         Data:   Temperature:  Current - Temp: 98 °F (36.7 °C);  Max - Temp  Av.2 °F (36.8 °C)  Min: 98 °F (36.7 °C)  Max: 98.3 °F (36.8 °C)    Respiratory Rate : Resp  Av  Min: 18  Max: 18    Pulse Range: Pulse  Av  Min: 56  Max: 64    Blood Presuure Range:  Systolic (53IRR), TPL:227 , Min:128 , NMO:289   ; Diastolic (71YLJ), Av, Min:52, Max:58      Pulse ox Range: SpO2  Av %  Min: 93 %  Max: 99 %    Patient Vitals for the past 8 hrs:   BP Temp Temp src Pulse Resp SpO2 Weight   19 0930 (!) 136/52 -- -- 60 -- -- --   19 0730 (!) 140/56 98 °F (36.7 °C) Oral 56 18 99 % --   19 0615 -- -- -- -- -- -- 163 lb (73.9 kg)         Intake/Output Summary (Last 24 hours) at 2019 1130  Last data filed at 2019 0820  Gross per 24 hour   Intake 1020 ml   Output 1700 ml   Net -680 ml       I/O last 3 completed shifts: In: 1020 [P.O.:120;  I.V.:900]  Out: 1700 [Urine:1700]    I/O this shift:  In: 120 [P.O.:120]  Out: -     Wt Readings from Last 3 Encounters:   19 163 lb (73.9 kg)   03/15/19 152 lb (68.9 kg)   18 150 lb (68 kg)       Labs:   CBC:   Lab Results   Component Value Date    WBC 4.5 2019    RBC 3.03 2019    HGB 8.6 2019    HCT 27.6 2019    MCV 91.1 2019    MCH 28.4 2019    MCHC 31.2 2019    RDW 14.5 2019    PLT 84 2019    MPV 10.7 2019     CBC with Differential:    Lab Results   Component Value Date    WBC 4.5 2019    RBC 3.03 2019    HGB 8.6 2019    HCT 27.6 2019    PLT 84 2019    MCV 91.1 2019    MCH 28.4 2019    MCHC 31.2 2019    RDW 14.5 2019    SEGSPCT 69 04/15/2013    LYMPHOPCT 16.8 2019    MONOPCT 9.4 2019    BASOPCT 0.4 2019    MONOSABS 0.42 2019    LYMPHSABS 0.75 2019    EOSABS 0.18 2019    BASOSABS 0.02 2019     Platelets:    Lab Results   Component Value Date    PLT 84 2019     Hemoglobin/Hematocrit:    Lab Results   Component Value Date    HGB 8.6 2019    HCT 27.6 2019     CMP:    Lab Results   Component Value Date     2019    K 4.0 2019    K 4.0 2019     2019    CO2 23 2019    BUN 12 2019    CREATININE 1.1 2019    GFRAA 57 2019    LABGLOM 47 2019 GLUCOSE 107 05/25/2019    PROT 5.5 05/25/2019    LABALBU 3.1 05/25/2019    CALCIUM 8.7 05/25/2019    BILITOT 0.3 05/25/2019    ALKPHOS 56 05/25/2019    AST 11 05/25/2019    ALT 12 05/25/2019     BMP:    Lab Results   Component Value Date     05/25/2019    K 4.0 05/25/2019    K 4.0 05/23/2019     05/25/2019    CO2 23 05/25/2019    BUN 12 05/25/2019    LABALBU 3.1 05/25/2019    CREATININE 1.1 05/25/2019    CALCIUM 8.7 05/25/2019    GFRAA 57 05/25/2019    LABGLOM 47 05/25/2019    GLUCOSE 107 05/25/2019     Hepatic Function Panel:    Lab Results   Component Value Date    ALKPHOS 56 05/25/2019    ALT 12 05/25/2019    AST 11 05/25/2019    PROT 5.5 05/25/2019    BILITOT 0.3 05/25/2019    BILIDIR <0.2 05/25/2019    IBILI see below 05/25/2019    LABALBU 3.1 05/25/2019     Magnesium:    Lab Results   Component Value Date    MG 1.7 05/25/2019     Phosphorus:    Lab Results   Component Value Date    PHOS 3.1 05/25/2019     LDH:  No results found for: LDH  PT/INR:  No results found for: PROTIME, INR  Warfarin PT/INR:  No components found for: PTPATWAR, PTINRWAR  PTT:  No results found for: APTT, PTT[APTT}  Troponin:    Lab Results   Component Value Date    TROPONINI <0.01 05/23/2019     Last 3 Troponin:    Lab Results   Component Value Date    TROPONINI <0.01 05/23/2019    TROPONINI 0.01 09/04/2018    TROPONINI <0.01 07/07/2018     U/A:    Lab Results   Component Value Date    NITRITE positive 02/22/2017    COLORU DARK YELLOW 05/23/2019    PROTEINU 30 05/23/2019    PHUR 5.5 05/23/2019    LABCAST RARE 05/23/2019    WBCUA 5-10 05/23/2019    RBCUA 10-20 05/23/2019    BACTERIA MODERATE 05/23/2019    CLARITYU Clear 05/23/2019    SPECGRAV 1.015 05/23/2019    LEUKOCYTESUR TRACE 05/23/2019    UROBILINOGEN 0.2 05/23/2019    BILIRUBINUR Negative 05/23/2019    BILIRUBINUR negative 02/22/2017    BLOODU MODERATE 05/23/2019    GLUCOSEU Negative 05/23/2019    AMORPHOUS FEW 12/09/2018     HgBA1c:    Lab Results   Component Value Date    LABA1C 5.4 05/24/2019     FLP:    Lab Results   Component Value Date    TRIG 124 05/24/2019    HDL 38 05/24/2019    LDLCALC 37 05/24/2019    LABVLDL 25 05/24/2019     TSH:    Lab Results   Component Value Date    TSH 2.040 05/24/2019     VITAMIN B12: No components found for: B12  FOLATE:    Lab Results   Component Value Date    FOLATE 9.2 05/24/2019     LIPASE:    Lab Results   Component Value Date    LIPASE 14 05/23/2019        CBC:  Recent Labs     05/23/19 2047 05/24/19  0640 05/25/19  0655   WBC 7.4 6.9 4.5   RBC 3.71 3.41* 3.03*   HGB 10.2* 9.4* 8.6*   HCT 32.0* 30.0* 27.6*   * 92* 84*   MCV 86.3 88.0 91.1   MCH 27.5 27.6 28.4   MCHC 31.9* 31.3* 31.2*   RDW 14.4 14.4 14.5   LYMPHOPCT 6.9* 8.1* 16.8*   MONOPCT 6.0 6.0 9.4   BASOPCT 0.3 0.1 0.4   MONOSABS 0.44 0.41 0.42   LYMPHSABS 0.51* 0.56* 0.75*   EOSABS 0.21 0.22 0.18   BASOSABS 0.02 0.01 0.02          H & H :  Recent Labs     05/23/19 2047 05/24/19  0640 05/25/19  0655   HGB 10.2* 9.4* 8.6*       TSH:  Recent Labs     05/24/19  0640   TSH 2.040       GLUCOSE:No results for input(s): POCGLU in the last 72 hours. CMP:  Recent Labs     05/23/19 2047 05/24/19  0640 05/25/19  0655    139 140   K 4.0 3.9 4.0   CL 99 101 105   CO2 26 24 23   BUN 17 13 12   CREATININE 1.2* 1.1* 1.1*   GFRAA 51 57 57   LABGLOM 42 47 47   GLUCOSE 133* 114* 107*   PROT 6.3* 6.0* 5.5*   LABALBU 3.6 3.3* 3.1*   CALCIUM 9.1 8.4* 8.7   BILITOT 0.5 0.3 0.3   ALKPHOS 68 62 56   AST 9 10 11   ALT 9 10 12        BNP:No results found for: BNP    PROTIME/INR:No results for input(s): PROTIME, INR in the last 72 hours. CRP: No results for input(s): CRP in the last 72 hours. ESR:No results for input(s): SEDRATE in the last 72 hours.     LIPASE , AMYLASE:  Lab Results   Component Value Date    LIPASE 14 05/23/2019      No results found for: AMYLASE    ABGs: No results found for: PH, PO2, PCO2    CARDIAC:   Recent Labs     05/23/19 2047   TROPONINI <0.01       Lipid Profile:   Lab Results   Component Value Date    TRIG 124 05/24/2019    HDL 38 05/24/2019    LDLCALC 37 05/24/2019    CHOL 100 05/24/2019        Lab Results   Component Value Date    LABA1C 5.4 05/24/2019            RADIOLOGY: REVIEWED AVAILABLE REPORT  XR CHEST PORTABLE   Final Result      Lines, tubes, and devices:  None. Lungs and pleura: Increased reticular markings bilaterally in   conjunction with perihilar prominence suggests vascular congestion   and/or interstitial edema. Small bilateral pleural effusions are   suspected. No dense airspace consolidation. No pneumothorax. Cardiomediastinal silhouette:  Unchanged enlarged cardiomediastinal   silhouette. Other:  Bony structures are unremarkable.                          Moe Loyola DO   11:30 AM     5/25/2019      Voice recognition software used for dictation

## 2019-05-25 NOTE — CONSULTS
depressive disorder ([facility] [address] 3/3/2017    Movement disorder     Muscle wasting and atrophy, not elsewhere classified, multiple sites     Neurogenic bladder as late effect of cerebrovascular accident (CVA) 03/2019    Other forms of scoliosis, thoracic region     Other specified forms of tremor     Psychiatric problem     PVD (peripheral vascular disease) ([facility] [address]     Restless leg syndrome     Thrombocytopenia ([facility] [address] 5/24/2019    Unspecified dementia with behavioral disturbance     Urinary retention     UTI (urinary tract infection) 2/22/2017     Past Surgical History:        Procedure Laterality Date    APPENDECTOMY      BACK SURGERY      BLADDER SURGERY      COLONOSCOPY      CYSTOSCOPY N/A 3/15/2019    CYSTOSCOPY SUPRAPUBIC TUBE PLACEMENT performed by [facility]yapa Syed DO at [facility] OR    ENDOSCOPY, COLON, DIAGNOSTIC      EYE SURGERY      HYSTERECTOMY      SPINE SURGERY       Current Medications:   Scheduled Meds:   sodium chloride flush  10 mL Intravenous BID    amLODIPine  10 mg Oral Daily    aspirin  81 mg Oral Daily    atorvastatin  40 mg Oral Nightly    busPIRone  10 mg Oral TID    vitamin B-12  1,000 mcg Oral Daily    sucralfate  1 g Oral 4x Daily WC    pantoprazole  40 mg Oral QAM AC    mirtazapine  7.5 mg Oral Nightly    lisinopril  40 mg Oral Daily    linaclotide  290 mcg Oral Daily    donepezil  10 mg Oral Nightly    vitamin D  5,000 Units Oral Daily    enoxaparin  30 mg Subcutaneous Daily     Continuous Infusions:   sodium chloride 100 mL/hr at 05/24/19 0205     PRN Meds:bisacodyl, simethicone, magnesium hydroxide, acetaminophen, polyvinyl alcohol    Allergies:  Penicillins    Social History:   Social History     Socioeconomic History    Marital status:       Spouse name: None    Number of children: None    Years of education: None    Highest education level: None   Occupational History    None   Social Needs    Financial resource strain: None    Food insecurity: Worry: None     Inability: None    Transportation needs:     Medical: None     Non-medical: None   Tobacco Use    Smoking status: Never Smoker    Smokeless tobacco: Never Used   Substance and Sexual Activity    Alcohol use: No    Drug use: No    Sexual activity: None   Lifestyle    Physical activity:     Days per week: None     Minutes per session: None    Stress: None   Relationships    Social connections:     Talks on phone: None     Gets together: None     Attends [employer] service: None     Active member of club or organization: None     Attends meetings of clubs or organizations: None     Relationship status: None    Intimate partner violence:     Fear of current or ex partner: None     Emotionally abused: None     Physically abused: None     Forced sexual activity: None   Other Topics Concern    None   Social History Narrative    None     Tobacco: No  Alcohol: No  Pets: No  Travel: No    Family History:   History reviewed. No pertinent family history. . Otherwise non-pertinent to the chief complaint. REVIEW OF SYSTEMS:    CONSTITUTIONAL:  No chills, fevers or night sweats. No loss of weight. EYES:  No double vision or drainage from eyes, ears or throat. HEENT:  No neck stiffness. No dysphagia. No drainage from eyes, ears or throat  RESPIRATORY:  No cough, productive sputum or hemoptysis. CARDIOVASCULAR:  No chest pain, palpitations, orthopnea or dyspnea on exertion. GASTROINTESTINAL:  No nausea, vomiting, diarrhea or constipation or hematochezia   GENITOURINARY:  No frequency burning dysuria or hematuria. INTEGUMENT/BREAST:  No rash or breast masses. HEMATOLOGIC/LYMPHATIC:  No lymphadenopathy or blood dyscrasics. ALLERGIC/IMMUNOLOGIC:  No anaphylaxis. ENDOCRINE:  No polyuria or polydipsia or temperature intolerance. MUSCULOSKELETAL:  No myalgia or arthralgia. Full ROM. NEUROLOGICAL:  No focal motor sensory deficit. BEHAVIOR/PSYCH:  No psychosis.      PHYSICAL EXAM:    Vitals: Date    TSH 2.040 05/24/2019       Lab Results   Component Value Date    NITRITE positive 02/22/2017    COLORU DARK YELLOW 05/23/2019    PHUR 5.5 05/23/2019    LABCAST RARE 05/23/2019    WBCUA 5-10 05/23/2019    RBCUA 10-20 05/23/2019    BACTERIA MODERATE 05/23/2019    CLARITYU Clear 05/23/2019    SPECGRAV 1.015 05/23/2019    LEUKOCYTESUR TRACE 05/23/2019    UROBILINOGEN 0.2 05/23/2019    BILIRUBINUR Negative 05/23/2019    BILIRUBINUR negative 02/22/2017    BLOODU MODERATE 05/23/2019    GLUCOSEU Negative 05/23/2019    AMORPHOUS FEW 12/09/2018       No results found for: GML0PQS, BEART, A8KPGGKI, PHART, THGBART, DGV2CVJ, PO2ART, NHB8ABG  Radiology:  XR CHEST PORTABLE   Final Result      Lines, tubes, and devices:  None. Lungs and pleura: Increased reticular markings bilaterally in   conjunction with perihilar prominence suggests vascular congestion   and/or interstitial edema. Small bilateral pleural effusions are   suspected. No dense airspace consolidation. No pneumothorax. Cardiomediastinal silhouette:  Unchanged enlarged cardiomediastinal   silhouette. Other:  Bony structures are unremarkable. Microbiology:  Pending  Recent Labs     05/23/19  2047   BC 24 Hours- no growth     Recent Labs     05/23/19  2142   ORG Enterococcus species*     Recent Labs     05/23/19 2047   BLOODCULT2 24 Hours- no growth     No results for input(s): STREPNEUMAGU in the last 72 hours. No results for input(s): LP1UAG in the last 72 hours. No results for input(s): ASO in the last 72 hours. No results for input(s): CULTRESP in the last 72 hours. Assessment:  · ADR TO MACRODANTIN  · URINE COLONIZED C E FAECALIS  · H/H DROPPED 2 GRAMS    Plan:    · STOP ATB; ASYMPTOMATIC BACTERURIA   · Check cultures  · Baseline ESR, CRP  · Monitor labs  · Will follow with you    Thank you for having us see this patient in consultation. I will be discussing this case with the treating physicians.       Electronically signed by Telma Becerra MD on 5/25/2019 at 1:03 PM

## 2019-05-26 NOTE — PROGRESS NOTES
PROGRESS  NOTE --                                                          INTERNAL  MEDICINE                                                                              I  PERSONALLY SAW , EXAMINED, AND CARED Yan Esqueda, 5/26/2019     LABS, XRAY ,CHART, AND MEDICATIONS  REVIEWED BY ME .      PATIENT PROBLEM LIST:  Principal Problem:    Catheter-associated urinary tract infection (HCC)  Active Problems:    RLS (restless legs syndrome)    GERD (gastroesophageal reflux disease)    HTN (hypertension)    Hyperlipidemia    PVD (peripheral vascular disease) (HCC)    CKD (chronic kidney disease) stage 3, GFR 30-59 ml/min (HCC)    Alzheimer disease    Neurogenic bladder as late effect of cerebrovascular accident (CVA)    Thrombocytopenia (HCC)    ESBL (extended spectrum beta-lactamase) producing bacteria infection  Resolved Problems:    UTI (urinary tract infection)           5/25/19-SUBJECTIVE: Mark Wooten is alert awake and cooperative; oriented ×3. Denies any chest pain dyspnea nausea emesis. Tolerating diet. No abdominal pain. Sitting quietly in bed looks quite comfortable. Temperature 98.0 respirations 18 pulse 56 blood pressure 140/56. She remains on 5 L nasal cannula with 99% saturation. Sodium 140 potassium 4.0 chloride 105 CO2 23 BUN 12 creatinine 1.2 glucose 107 magnesium 1.7 calcium 8.7 phosphorus 3.1 protein 5.5 albumin 3.1. Liver functions normal. LDL 37. A1c 5.4. Hemoglobin 8.6 WBC 4.5 platelets 61,848. Preliminary urine culture enterococcus species less than 25,000; sensitivities to follow. On 5/18/19 patient did have ESBL positive E. coli. That was done at her nursing facility. 5/26/19-patient laying quietly in bed; no chest pain dyspnea appetite good. She was seen yesterday by infectious disease and they recommended cessation of antibiotics; asymptomatic bacteriuria.  Today sodium 140 potassium 3.9 chloride 104 CO2 26 BUN 14 creatinine 1.0 magnesium 1.8 glucose 112 calcium 9.0 phosphorus 2.9 protein 5.6 albumin 3.0 liver functions normal. Hemoglobin 8.6 (has varied between 8.6 and 10.2 for the last 4 months). WBC 3.5 platelets 249. ROS:  Negative to a 10 system review except that mentioned in the HPI. Objective:     PHYSICAL EXAM:    VS: BP (!) 148/68   Pulse 62   Temp 98.7 °F (37.1 °C) (Oral)   Resp 18   Ht 5' (1.524 m)   Wt 163 lb (73.9 kg)   SpO2 97%   BMI 31.83 kg/m²   General appearance: Alert, Awake, Oriented times 3, no distress  Skin: Warm and dry ; no rashes  Head: Normocephalic. No masses, lesions or tenderness noted  Eyes: Conjunctivae pale, sclera white. PERRL,EOM-I  Ears: External ears normal  Nose/Sinuses: Nares normal. Septum midline. Mucosa normal. No drainage  Oropharynx: Oropharynx clear with no exudate seen  Neck: Supple. No jugular venous distension, lymphadenopathy or thyromegaly Trachea midline  Lungs: Clear to auscultation bilaterally. No rhonchi, crackles or wheezes  Heart: S1 S2  Regular rate and rhythm. No rub or gallop; grade 1/6 systolic murmur 2nd right intercostal space  Abdomen: Soft, non-tender.  BS normal. No masses, organomegaly; no rebound or guarding; suprapubic catheter in place  Extremities: No edema, Peripheral pulses palpable  Musculoskeletal; weakness left upper extremity compared to right  Neuro:  Cranial nerves II through XII intact; weakness left upper extremity compared to right    TELEMETRY: REVIEWED--Telemetry: Sinus    ASSESSMENT:   Principal Problem:    Catheter-associated urinary tract infection (HCC)  Active Problems:    RLS (restless legs syndrome)    GERD (gastroesophageal reflux disease)    HTN (hypertension)    Hyperlipidemia    PVD (peripheral vascular disease) (HCC)    CKD (chronic kidney disease) stage 3, GFR 30-59 ml/min (HCC)    Alzheimer disease    Neurogenic bladder as late effect of cerebrovascular accident (CVA)    Thrombocytopenia ([facility] [address] ESBL (extended spectrum beta-lactamase) producing bacteria infection  Resolved Problems:    UTI (urinary tract infection)      PLAN:  SEE ORDERS      RE  CHANGES AND FINDINGS   Medications reviewed with patient  GI prophylaxis  DVT prophylaxis  Consultants notes reviewed   Stop Levaquin IV in view of recent positive ESBL blood cultures  ID consult  Meropenem 1 g every 8 hours IV until seen by ID  Monitor labs  Med reconciliation completed  N 17 completed  Prescriptions written  May return to nursing home today if okay with infectious disease  Follow-up Dr. Vamsi Groves one week  Remain off antibiotics per infectious disease at this time        Discussed with patient and nursing.       Judith Loyola  DO     11:31 AM     5/26/2019    TIME > 35  MINUTES    >  50 %  OF  TIME  DISCUSSION     Active Hospital Problems    Diagnosis    RLS (restless legs syndrome) [G25.81]     Priority: Low    ESBL (extended spectrum beta-lactamase) producing bacteria infection [A49.9, Z16.12]    Thrombocytopenia (HCC) [D69.6]    PVD (peripheral vascular disease) (HCC) [I73.9]    CKD (chronic kidney disease) stage 3, GFR 30-59 ml/min (HCC) [N18.3]    Catheter-associated urinary tract infection (HCC) [T83.511A, N39.0]    Alzheimer disease [G30.9, F02.80]    Neurogenic bladder as late effect of cerebrovascular accident (CVA) [place], N31.9]    GERD (gastroesophageal reflux disease) [K21.9]    HTN (hypertension) [I10]    Hyperlipidemia [E78.5]                 ------------  INFORMATION  -----------      DIET:DIET GENERAL;        Allergies   Allergen Reactions    Penicillins          MEDICATION SIDE EFFECTS:none       SCHEDULED MEDS:                                 Current Facility-Administered Medications   Medication Dose Route Frequency Provider Last Rate Last Dose    sodium chloride flush 0.9 % injection 10 mL  10 mL Intravenous BID Loc Loyola, DO   10 mL at 05/26/19 0858    amLODIPine (NORVASC) tablet 10 mg  10 mg Oral Daily Sena George Mihok, DO   10 mg at 05/26/19 0857    aspirin EC tablet 81 mg  81 mg Oral Daily Loc SANDHYA Bansalhok, DO   81 mg at 05/26/19 0857    atorvastatin (LIPITOR) tablet 40 mg  40 mg Oral Nightly Loc SANDHYA Mihok, DO   40 mg at 05/25/19 2102    bisacodyl (DULCOLAX) suppository 10 mg  10 mg Rectal Daily PRN Sena George Mihok, DO        busPIRone (BUSPAR) tablet 10 mg  10 mg Oral TID Sena George Mihok, DO   10 mg at 05/26/19 0857    vitamin B-12 (CYANOCOBALAMIN) tablet 1,000 mcg  1,000 mcg Oral Daily Loc SANDHYA Bansalhok, DO   1,000 mcg at 05/26/19 0857    sucralfate (CARAFATE) tablet 1 g  1 g Oral 4x Daily WC Loc SANDHYA Bansalhok, DO   1 g at 05/26/19 0858    simethicone (MYLICON) chewable tablet 80 mg  80 mg Oral 4x Daily PRN Sena Vazquez Mihok, DO        pantoprazole (PROTONIX) tablet 40 mg  40 mg Oral QAM AC Loc SANDHYA Mihok, DO   40 mg at 05/26/19 7240    mirtazapine (REMERON) tablet 7.5 mg  7.5 mg Oral Nightly Loc SANDHYA Bansalhok, DO   7.5 mg at 05/25/19 2102    magnesium hydroxide (MILK OF MAGNESIA) 400 MG/5ML suspension 30 mL  30 mL Oral Daily PRN Trenia Eglin SANDHYA Mihok, DO        lisinopril (PRINIVIL;ZESTRIL) tablet 40 mg  40 mg Oral Daily Loc SANDHYA Mihok, DO   40 mg at 05/26/19 9878    linaclotide (LINZESS) capsule 290 mcg  290 mcg Oral Daily Loc SANDHYA Bansalhok, DO        donepezil (ARICEPT) tablet 10 mg  10 mg Oral Nightly Loc SANDHYA Mihok, DO   10 mg at 05/25/19 2102    vitamin D (CHOLECALCIFEROL) tablet 5,000 Units  5,000 Units Oral Daily Sena George Mihok, DO   5,000 Units at 05/26/19 0857    0.9 % sodium chloride infusion   Intravenous Continuous Loc Loyola  mL/hr at 05/24/19 0205      acetaminophen (TYLENOL) tablet 650 mg  650 mg Oral Q4H PRN Sena Loyola DO        polyvinyl alcohol (LIQUIFILM TEARS) 1.4 % ophthalmic solution 1 drop  1 drop Both Eyes Q6H PRN Sena Loyola DO        enoxaparin (LOVENOX) injection 30 mg  30 mg Subcutaneous Daily Loc Loyola DO   30 mg at 05/26/19 0857     Scheduled Meds:  [facility] sodium chloride flush  10 mL Intravenous BID    amLODIPine  10 mg Oral Daily    aspirin  81 mg Oral Daily    atorvastatin  40 mg Oral Nightly    busPIRone  10 mg Oral TID    vitamin B-12  1,000 mcg Oral Daily    sucralfate  1 g Oral 4x Daily WC    pantoprazole  40 mg Oral QAM AC    mirtazapine  7.5 mg Oral Nightly    lisinopril  40 mg Oral Daily    linaclotide  290 mcg Oral Daily    donepezil  10 mg Oral Nightly    vitamin D  5,000 Units Oral Daily    enoxaparin  30 mg Subcutaneous Daily       Continuous Infusions:   sodium chloride 100 mL/hr at 19 0205         Data:   Temperature:  Current - Temp: 98.7 °F (37.1 °C); Max - Temp  Av.8 °F (37.1 °C)  Min: 98.7 °F (37.1 °C)  Max: 98.8 °F (37.1 °C)    Respiratory Rate : Resp  Av  Min: 16  Max: 18    Pulse Range: Pulse  Av.5  Min: 61  Max: 62    Blood Presuure Range:  Systolic (30SIX), YBI:221 , Min:148 , FXM:992   ; Diastolic (45PDW), GVA:08, Min:68, Max:73      Pulse ox Range: SpO2  Av.5 %  Min: 96 %  Max: 97 %    Patient Vitals for the past 8 hrs:   BP Temp Temp src Pulse Resp SpO2   19 0857 (!) 148/68 98.7 °F (37.1 °C) Oral 62 18 97 %         Intake/Output Summary (Last 24 hours) at 2019 1131  Last data filed at 2019 0411  Gross per 24 hour   Intake 420 ml   Output 1125 ml   Net -705 ml       I/O last 3 completed shifts: In: 540 [P.O.:540]  Out: 1125 [Urine:1125]    No intake/output data recorded.     Wt Readings from Last 3 Encounters:   19 163 lb (73.9 kg)   03/15/19 152 lb (68.9 kg)   18 150 lb (68 kg)       Labs:   CBC:   Lab Results   Component Value Date    WBC 3.5 2019    RBC 3.10 2019    HGB 8.6 2019    HCT 27.0 2019    MCV 87.1 2019    MCH 27.7 2019    MCHC 31.9 2019    RDW 14.3 2019     2019    MPV 11.0 2019     CBC with Differential:    Lab Results   Component Value Date    WBC 3.5 2019    RBC 3.10 2019    HGB 8.6 05/26/2019    HCT 27.0 05/26/2019     05/26/2019    MCV 87.1 05/26/2019    MCH 27.7 05/26/2019    MCHC 31.9 05/26/2019    RDW 14.3 05/26/2019    SEGSPCT 69 04/15/2013    LYMPHOPCT 25.8 05/26/2019    MONOPCT 8.6 05/26/2019    BASOPCT 0.3 05/26/2019    MONOSABS 0.30 05/26/2019    LYMPHSABS 0.90 05/26/2019    EOSABS 0.19 05/26/2019    BASOSABS 0.01 05/26/2019     Platelets:    Lab Results   Component Value Date     05/26/2019     Hemoglobin/Hematocrit:    Lab Results   Component Value Date    HGB 8.6 05/26/2019    HCT 27.0 05/26/2019     CMP:    Lab Results   Component Value Date     05/26/2019    K 3.9 05/26/2019    K 4.0 05/23/2019     05/26/2019    CO2 26 05/26/2019    BUN 14 05/26/2019    CREATININE 1.0 05/26/2019    GFRAA >60 05/26/2019    LABGLOM 52 05/26/2019    GLUCOSE 112 05/26/2019    PROT 5.6 05/26/2019    LABALBU 3.0 05/26/2019    CALCIUM 9.0 05/26/2019    BILITOT 0.2 05/26/2019    ALKPHOS 63 05/26/2019    AST 11 05/26/2019    ALT 14 05/26/2019     BMP:    Lab Results   Component Value Date     05/26/2019    K 3.9 05/26/2019    K 4.0 05/23/2019     05/26/2019    CO2 26 05/26/2019    BUN 14 05/26/2019    LABALBU 3.0 05/26/2019    CREATININE 1.0 05/26/2019    CALCIUM 9.0 05/26/2019    GFRAA >60 05/26/2019    LABGLOM 52 05/26/2019    GLUCOSE 112 05/26/2019     Hepatic Function Panel:    Lab Results   Component Value Date    ALKPHOS 63 05/26/2019    ALT 14 05/26/2019    AST 11 05/26/2019    PROT 5.6 05/26/2019    BILITOT 0.2 05/26/2019    BILIDIR <0.2 05/26/2019    IBILI see below 05/26/2019    LABALBU 3.0 05/26/2019     Magnesium:    Lab Results   Component Value Date    MG 1.8 05/26/2019     Phosphorus:    Lab Results   Component Value Date    PHOS 2.9 05/26/2019     LDH:  No results found for: LDH  PT/INR:  No results found for: PROTIME, INR  Warfarin PT/INR:  No components found for: PTPATWAR, PTINRWAR  PTT:  No results found for: APTT, PTT[APTT}  Troponin:    Lab Results   Component Value Date    TROPONINI <0.01 05/23/2019     Last 3 Troponin:    Lab Results   Component Value Date    TROPONINI <0.01 05/23/2019    TROPONINI 0.01 09/04/2018    TROPONINI <0.01 07/07/2018     U/A:    Lab Results   Component Value Date    NITRITE positive 02/22/2017    COLORU DARK YELLOW 05/23/2019    PROTEINU 30 05/23/2019    PHUR 5.5 05/23/2019    LABCAST RARE 05/23/2019    WBCUA 5-10 05/23/2019    RBCUA 10-20 05/23/2019    BACTERIA MODERATE 05/23/2019    CLARITYU Clear 05/23/2019    SPECGRAV 1.015 05/23/2019    LEUKOCYTESUR TRACE 05/23/2019    UROBILINOGEN 0.2 05/23/2019    BILIRUBINUR Negative 05/23/2019    BILIRUBINUR negative 02/22/2017    BLOODU MODERATE 05/23/2019    GLUCOSEU Negative 05/23/2019    AMORPHOUS FEW 12/09/2018     HgBA1c:    Lab Results   Component Value Date    LABA1C 5.4 05/24/2019     FLP:    Lab Results   Component Value Date    TRIG 124 05/24/2019    HDL 38 05/24/2019    LDLCALC 37 05/24/2019    LABVLDL 25 05/24/2019     TSH:    Lab Results   Component Value Date    TSH 2.040 05/24/2019     VITAMIN B12: No components found for: B12  FOLATE:    Lab Results   Component Value Date    FOLATE 9.2 05/24/2019     LIPASE:    Lab Results   Component Value Date    LIPASE 14 05/23/2019        CBC:  Recent Labs     05/24/19  0640 05/25/19  0655 05/26/19  0405   WBC 6.9 4.5 3.5*   RBC 3.41* 3.03* 3.10*   HGB 9.4* 8.6* 8.6*   HCT 30.0* 27.6* 27.0*   PLT 92* 84* 105*   MCV 88.0 91.1 87.1   MCH 27.6 28.4 27.7   MCHC 31.3* 31.2* 31.9*   RDW 14.4 14.5 14.3   LYMPHOPCT 8.1* 16.8* 25.8   MONOPCT 6.0 9.4 8.6   BASOPCT 0.1 0.4 0.3   MONOSABS 0.41 0.42 0.30   LYMPHSABS 0.56* 0.75* 0.90*   EOSABS 0.22 0.18 0.19   BASOSABS 0.01 0.02 0.01          H & H :  Recent Labs     05/23/19 2047 05/24/19  0640 05/25/19  0655 05/26/19  0405   HGB 10.2* 9.4* 8.6* 8.6*       TSH:  Recent Labs     05/24/19  0640   TSH 2.040       GLUCOSE:No results for input(s): POCGLU in the last 72 hours.     CMP:  Recent Labs     05/24/19  0640 05/25/19  0655 05/26/19  0405    140 140   K 3.9 4.0 3.9    105 104   CO2 24 23 26   BUN 13 12 14   CREATININE 1.1* 1.1* 1.0   GFRAA 57 57 >60   LABGLOM 47 47 52   GLUCOSE 114* 107* 112*   PROT 6.0* 5.5* 5.6*   LABALBU 3.3* 3.1* 3.0*   CALCIUM 8.4* 8.7 9.0   BILITOT 0.3 0.3 0.2   ALKPHOS 62 56 63   AST 10 11 11   ALT 10 12 14        BNP:No results found for: BNP    PROTIME/INR:No results for input(s): PROTIME, INR in the last 72 hours. CRP: No results for input(s): CRP in the last 72 hours. ESR:No results for input(s): SEDRATE in the last 72 hours. LIPASE , AMYLASE:  Lab Results   Component Value Date    LIPASE 14 05/23/2019      No results found for: AMYLASE    ABGs: No results found for: PH, PO2, PCO2    CARDIAC:   Recent Labs     05/23/19 2047   TROPONINI <0.01       Lipid Profile:   Lab Results   Component Value Date    TRIG 124 05/24/2019    HDL 38 05/24/2019    LDLCALC 37 05/24/2019    CHOL 100 05/24/2019        Lab Results   Component Value Date    LABA1C 5.4 05/24/2019            RADIOLOGY: REVIEWED AVAILABLE REPORT  XR CHEST PORTABLE   Final Result      Lines, tubes, and devices:  None. Lungs and pleura: Increased reticular markings bilaterally in   conjunction with perihilar prominence suggests vascular congestion   and/or interstitial edema. Small bilateral pleural effusions are   suspected. No dense airspace consolidation. No pneumothorax. Cardiomediastinal silhouette:  Unchanged enlarged cardiomediastinal   silhouette. Other:  Bony structures are unremarkable.                          [address]   11:31 AM     5/26/2019      Voice recognition software used for dictation

## 2019-05-26 NOTE — PROGRESS NOTES
[address] [place] [address] [facility]  Progress Note    SUBJECTIVE:  Chief Complaint   Patient presents with    Fever     currently being treated for UTI; now having fevers, nausea and emesis    Nausea    Urinary Tract Infection     Patient is tolerating medications. No reported adverse drug reactions. No nausea, vomiting, diarrhea. Review of systems:  As stated above in the chief complaint, otherwise negative. Medications:  Scheduled Meds:   sodium chloride flush  10 mL Intravenous BID    amLODIPine  10 mg Oral Daily    aspirin  81 mg Oral Daily    atorvastatin  40 mg Oral Nightly    busPIRone  10 mg Oral TID    vitamin B-12  1,000 mcg Oral Daily    sucralfate  1 g Oral 4x Daily WC    pantoprazole  40 mg Oral QAM AC    mirtazapine  7.5 mg Oral Nightly    lisinopril  40 mg Oral Daily    linaclotide  290 mcg Oral Daily    donepezil  10 mg Oral Nightly    vitamin D  5,000 Units Oral Daily    enoxaparin  30 mg Subcutaneous Daily     Continuous Infusions:   sodium chloride 100 mL/hr at 19 0205     PRN Meds:bisacodyl, simethicone, magnesium hydroxide, acetaminophen, polyvinyl alcohol    OBJECTIVE:  BP (!) 148/68   Pulse 62   Temp 98.7 °F (37.1 °C) (Oral)   Resp 18   Ht 5' (1.524 m)   Wt 163 lb (73.9 kg)   SpO2 97%   BMI 31.83 kg/m²   Temp  Av.8 °F (37.1 °C)  Min: 98.7 °F (37.1 °C)  Max: 98.8 °F (37.1 °C)  Constitutional: The patient is awake, alert, and oriented. Skin: Warm and dry. No rashes were noted. HEENT: Eyes show round, and reactive pupils. No jaundice. Moist mucous membranes, no ulcerations, no thrush. Neck: Supple to movements. No lymphadenopathy. Chest: No use of accessory muscles to breathe. Symmetrical expansion. Auscultation reveals no wheezing, crackles, or rhonchi. Cardiovascular: S1 and S2 are rhythmic and regular. No murmurs appreciated. Abdomen: Positive bowel sounds to auscultation. Benign to palpation. No masses felt.  No hepatosplenomegaly. Suprapubic cath - yellow urine   Extremities: No clubbing, no cyanosis, + edema. Lines: peripheral    Laboratory and Tests Review:  Lab Results   Component Value Date    WBC 3.5 (L) 05/26/2019    WBC 4.5 05/25/2019    WBC 6.9 05/24/2019    HGB 8.6 (L) 05/26/2019    HCT 27.0 (L) 05/26/2019    MCV 87.1 05/26/2019     (L) 05/26/2019     Lab Results   Component Value Date    NEUTROABS 2.07 05/26/2019    NEUTROABS 3.08 05/25/2019    NEUTROABS 5.64 05/24/2019     Lab Results   Component Value Date    CRP 0.7 (H) 09/25/2017    CRP <0.1 02/23/2017    CRP <0.4 04/15/2013     No results found for: CRP  Lab Results   Component Value Date    SEDRATE 16 09/25/2017    SEDRATE 33 (H) 02/23/2017    SEDRATE 25 (H) 04/15/2013     Lab Results   Component Value Date    ALT 14 05/26/2019    AST 11 05/26/2019    ALKPHOS 63 05/26/2019    BILITOT 0.2 05/26/2019     Lab Results   Component Value Date     05/26/2019    K 3.9 05/26/2019    K 4.0 05/23/2019     05/26/2019    CO2 26 05/26/2019    BUN 14 05/26/2019    CREATININE 1.0 05/26/2019    CREATININE 1.1 05/25/2019    CREATININE 1.1 05/24/2019    GFRAA >60 05/26/2019    LABGLOM 52 05/26/2019    GLUCOSE 112 05/26/2019    PROT 5.6 05/26/2019    LABALBU 3.0 05/26/2019    CALCIUM 9.0 05/26/2019    BILITOT 0.2 05/26/2019    ALKPHOS 63 05/26/2019    AST 11 05/26/2019    ALT 14 05/26/2019     Radiology:      Microbiology:   No new cultures  Recent Labs     05/23/19 2047   BC 24 Hours- no growth     Recent Labs     05/23/19  2142   ORG Enterococcus faecalis*     Recent Labs     05/23/19 2047   BLOODCULT2 24 Hours- no growth     No results for input(s): STREPNEUMAGU in the last 72 hours. No results for input(s): LP1UAG in the last 72 hours. No results for input(s): ASO in the last 72 hours. No results for input(s): CULTRESP in the last 72 hours.   Recent Labs     05/23/19 2142   LABURIN <25,000 CFU/ml        SP ( 3-20-19) cath for neurogenic bladder not known to the Infectious Diseases service. The patient is has had ESBL E COLI AND VSE INTERMITTENTLY IN HER URINE. Assessment:  · ADR TO MACRODANTIN  · URINE COLONIZED C E FAECALIS  · H/H DROPPED 2 GRAMS     Plan:    · Follow off ATB; ASYMPTOMATIC BACTERURIA   · Check cultures  · Baseline ESR, CRP  · Monitor labs, PCP to monitor Hgb   · Ok to DC from ID pov--check with others        Sharon Phoenix  10:54 AM  5/26/2019     Pt seen and examined. Above discussed agree with advanced practice nurse. Labs, cultures, and radiographs reviewed. Face to Face encounter occurred. Changes made as necessary.      Haydee Holloway MD

## 2019-05-26 NOTE — CARE COORDINATION
Social Work discharge planning   Pt is returning [facility] [place] today at 6p via [facility]. REKHA was on hold with pt's [facility] insurance approval Stephie Brush ph [phone] for 29:26 minutes) before MARIO would finalize transport. Rekha notified pt and pt's son Edwina Chen at [phone] (he said [facility] [place] [facility] [facility] [place] and will see pt at [address] [place] [address] later today). Pt's Guy moseley.   Electronically signed by Cuauhtemoc Banuelos on 5/26/2019 at 3:16 PM

## 2019-06-01 NOTE — DISCHARGE SUMMARY
calcitonin 0.25. LDL 37. A1c 5.4 TSH 2.04 WBC 6.9 hemoglobin 9.4 platelets 44,815. 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 5/25/19-SUBJECTIVE: Vivek Yanez is alert awake and cooperative; oriented ×3. Denies any chest pain dyspnea nausea emesis. Tolerating diet. No abdominal pain. Sitting quietly in bed looks quite comfortable. Temperature 98.0 respirations 18 pulse 56 blood pressure 140/56. She remains on 5 L nasal cannula with 99% saturation. Sodium 140 potassium 4.0 chloride 105 CO2 23 BUN 12 creatinine 1.2 glucose 107 magnesium 1.7 calcium 8.7 phosphorus 3.1 protein 5.5 albumin 3.1. Liver functions normal. LDL 37. A1c 5.4. Hemoglobin 8.6 WBC 4.5 platelets 07,232. Preliminary urine culture enterococcus species less than 25,000; sensitivities to follow. On 5/18/19 patient did have ESBL positive E. coli. That was done at her nursing facility.     5/26/19-patient laying quietly in bed; no chest pain dyspnea appetite good. She was seen yesterday by infectious disease and they recommended cessation of antibiotics; asymptomatic bacteriuria. Today sodium 140 potassium 3.9 chloride 104 CO2 26 BUN 14 creatinine 1.0 magnesium 1.8 glucose 112 calcium 9.0 phosphorus 2.9 protein 5.6 albumin 3.0 liver functions normal. Hemoglobin 8.6 (has varied between 8.6 and 10.2 for the last 4 months). WBC 3.5 platelets 307. Instructions at discharge:  Med reconciliation completed  N 17 completed  Prescriptions written  May return to nursing home today if okay with infectious disease  Follow-up Dr. Kyle Nunn one week  Remain off antibiotics per infectious disease at this time          Condition at DISCHARGE : Alejandroarielleca [id number]     Discharged to:  [facility]     Discharge Instructions: Medications reviewed with patient    Consults:ID     Past Medical Hx :   Past Medical History:   Diagnosis Date    Alzheimer disease     Arthritis     Blood circulation, with Differential:    Lab Results   Component Value Date    WBC 3.5 05/26/2019    RBC 3.10 05/26/2019    HGB 8.6 05/26/2019    HCT 27.0 05/26/2019     05/26/2019    MCV 87.1 05/26/2019    MCH 27.7 05/26/2019    MCHC 31.9 05/26/2019    RDW 14.3 05/26/2019    SEGSPCT 69 04/15/2013    LYMPHOPCT 25.8 05/26/2019    MONOPCT 8.6 05/26/2019    BASOPCT 0.3 05/26/2019    MONOSABS 0.30 05/26/2019    LYMPHSABS 0.90 05/26/2019    EOSABS 0.19 05/26/2019    BASOSABS 0.01 05/26/2019     Hemoglobin/Hematocrit:    Lab Results   Component Value Date    HGB 8.6 05/26/2019    HCT 27.0 05/26/2019     CMP:    Lab Results   Component Value Date     05/26/2019    K 3.9 05/26/2019    K 4.0 05/23/2019     05/26/2019    CO2 26 05/26/2019    BUN 14 05/26/2019    CREATININE 1.0 05/26/2019    GFRAA >60 05/26/2019    LABGLOM 52 05/26/2019    GLUCOSE 112 05/26/2019    PROT 5.6 05/26/2019    LABALBU 3.0 05/26/2019    CALCIUM 9.0 05/26/2019    BILITOT 0.2 05/26/2019    ALKPHOS 63 05/26/2019    AST 11 05/26/2019    ALT 14 05/26/2019     BMP:    Lab Results   Component Value Date     05/26/2019    K 3.9 05/26/2019    K 4.0 05/23/2019     05/26/2019    CO2 26 05/26/2019    BUN 14 05/26/2019    LABALBU 3.0 05/26/2019    CREATININE 1.0 05/26/2019    CALCIUM 9.0 05/26/2019    GFRAA >60 05/26/2019    LABGLOM 52 05/26/2019    GLUCOSE 112 05/26/2019     Hepatic Function Panel:    Lab Results   Component Value Date    ALKPHOS 63 05/26/2019    ALT 14 05/26/2019    AST 11 05/26/2019    PROT 5.6 05/26/2019    BILITOT 0.2 05/26/2019    BILIDIR <0.2 05/26/2019    IBILI see below 05/26/2019    LABALBU 3.0 05/26/2019     Ionized Calcium:  No results found for: IONCA  Magnesium:    Lab Results   Component Value Date    MG 1.8 05/26/2019     Phosphorus:    Lab Results   Component Value Date    PHOS 2.9 05/26/2019     Uric Acid:  No results found for: LABURIC, URICACID  PT/INR:  No results found for: PROTIME, INR  Warfarin PT/INR:  No components found for: PTPATWAR, PTINRWAR  PTT:  No results found for: APTT, PTT[APTT}  Troponin:    Lab Results   Component Value Date    TROPONINI <0.01 05/23/2019     Last 3 Troponin:    Lab Results   Component Value Date    TROPONINI <0.01 05/23/2019    TROPONINI 0.01 09/04/2018    TROPONINI <0.01 07/07/2018     U/A:    Lab Results   Component Value Date    NITRITE positive 02/22/2017    COLORU Yellow 05/30/2019    PROTEINU TRACE 05/30/2019    PHUR 7.0 05/30/2019    LABCAST RARE 05/23/2019    WBCUA 2-5 05/30/2019    RBCUA 1-3 05/30/2019    BACTERIA MANY 05/30/2019    CLARITYU Clear 05/30/2019    SPECGRAV <=1.005 05/30/2019    LEUKOCYTESUR MODERATE 05/30/2019    UROBILINOGEN 0.2 05/30/2019    BILIRUBINUR Negative 05/30/2019    BILIRUBINUR negative 02/22/2017    BLOODU SMALL 05/30/2019    GLUCOSEU Negative 05/30/2019    AMORPHOUS FEW 12/09/2018     HgBA1c:    Lab Results   Component Value Date    LABA1C 5.4 05/24/2019     FLP:    Lab Results   Component Value Date    TRIG 124 05/24/2019    HDL 38 05/24/2019    LDLCALC 37 05/24/2019    LABVLDL 25 05/24/2019     TSH:    Lab Results   Component Value Date    TSH 2.040 05/24/2019     VITAMIN B12: No components found for: B12  FOLATE:    Lab Results   Component Value Date    FOLATE 9.2 05/24/2019     LIPASE:    Lab Results   Component Value Date    LIPASE 14 05/23/2019          CBC:No results for input(s): WBC, RBC, HGB, HCT, PLT, MCV, MCH, MCHC, RDW, NRBC, SEGSPCT, BANDSPCT, BLASTSPCT, METASPCT, LYMPHOPCT, PROMYELOPCT, MONOPCT, MYELOPCT, EOSPCT, BASOPCT, MONOSABS, LYMPHSABS, EOSABS, BASOSABS, DIFFTYPE in the last 72 hours. Invalid input(s): ATYLMREL       H & H :No results for input(s): HGB in the last 72 hours. TSH:No results for input(s): TSH in the last 72 hours. GLUCOSE:No results for input(s): POCGLU in the last 72 hours.     CMP:No results for input(s): NA, K, CL, CO2, BUN, CREATININE, GFRAA, AGRATIO, LABGLOM, GLUCOSE, PROT, LABALBU, CALCIUM, BILITOT, ALKPHOS, AST, ALT in the last 72 hours. Invalid input(s): GLU      BNP:No results found for: BNP    PROTIME/INR:No results for input(s): PROTIME, INR in the last 72 hours. CRP: No results for input(s): CRP in the last 72 hours. ESR:No results for input(s): SEDRATE in the last 72 hours. LIPASE , AMYLASE:  Lab Results   Component Value Date    LIPASE 14 2019      No results found for: AMYLASE    ABGs: No results found for: PHART, PO2ART, [id number]    CARDIAC: No results for input(s): CKTOTAL, CKMB, CKMBINDEX, TROPONINI in the last 72 hours. Lipid Profile:   Lab Results   Component Value Date    TRIG 124 2019    HDL 38 2019    LDLCALC 37 2019    CHOL 100 2019        Xr Chest Portable    Result Date: 2019  EXAMINATION:  CHEST RADIOGRAPH (SINGLE VIEW AP OR PA) Patient MRN:  [id number] : 9/15/1929 Age: 80 years Gender: Female Order Date:  2019 8:00 PM EXAM: XR CHEST PORTABLE NUMBER OF IMAGES \ views: 1 INDICATION:  hypoxia with fever hypoxia with fever COMPARISON: 2018  RESULT: See IMPRESSION. Lines, tubes, and devices:  None. Lungs and pleura: Increased reticular markings bilaterally in conjunction with perihilar prominence suggests vascular congestion and/or interstitial edema. Small bilateral pleural effusions are suspected. No dense airspace consolidation. No pneumothorax. Cardiomediastinal silhouette:  Unchanged enlarged cardiomediastinal silhouette. Other:  Bony structures are unremarkable.        Discharge Exam:  See progress note from today    Discharge Medication List as of 2019  5:23 PM      CONTINUE these medications which have NOT CHANGED    Details   donepezil (ARICEPT) 10 MG tablet TAKE 1 TABLET BY MOUTH DAILY AT BEDTIME, R-11Historical Med      Pantoprazole Sodium (PROTONIX PO) Take 40 mg by mouth daily Historical Med      busPIRone (BUSPAR) 10 MG tablet Take 10 mg by mouth 3 times daily Take am dos 03/15Historical Med      atorvastatin (LIPITOR) 40 MG tablet Take 1 tablet by mouth nightly, Disp-30 tablet, R-3DC to SNF      lisinopril (PRINIVIL;ZESTRIL) 40 MG tablet Take 1 tablet by mouth daily, Disp-30 tablet, R-3DC to SNF      amLODIPine (NORVASC) 10 MG tablet Take 1 tablet by mouth daily, Disp-30 tablet, R-3DC to SNF      acetaminophen (TYLENOL) 325 MG tablet Take 650 mg by mouth every 4 hours as needed for Pain or FeverHistorical Med      LINZESS 290 MCG CAPS capsule Take 290 mcg by mouth daily, R-11, DAWHistorical Med      Cholecalciferol (VITAMIN D3) 5000 units CAPS Take 5,000 Units by mouth daily, R-11Historical Med      vitamin B-12 (CYANOCOBALAMIN) 100 MCG tablet Take 1,000 mcg by mouth dailyHistorical Med      mirtazapine (REMERON) 7.5 MG tablet Take 1 tablet by mouth nightly, Disp-30 tablet, R-0Print      sucralfate (CARAFATE) 1 GM tablet Take 1 tablet by mouth 4 times daily (with meals and nightly), Disp-120 tablet, R-3      aspirin 81 MG EC tablet Take 81 mg by mouth daily.         methyl salicylate-menthol (NANDO BRASHER GREASELESS) 10-15 % CREA Apply topically 3 times daily as needed for Pain Apply to back, Apply externally, 3 TIMES DAILY PRN, Historical Med      Sodium Phosphates (FLEET) 7-19 GM/118ML Place 1 enema rectally daily as neededHistorical Med      Throat Lozenges (CEPACOL MT) Take 1 lozenge by mouth every 2 hours as needed Historical Med      magnesium hydroxide (MILK OF MAGNESIA) 400 MG/5ML suspension Take by mouth daily as needed for ConstipationHistorical Med      hypromellose 0.4 % SOLN ophthalmic solution Place 1 drop into both eyes every 6 hours as neededHistorical Med      bisacodyl (DULCOLAX) 10 MG suppository Place 10 mg rectally daily as needed for ConstipationHistorical Med      simethicone (MYLICON) 80 MG chewable tablet Take 1 tablet by mouth 4 times daily as needed for Flatulence, Disp-90 tablet, R-3         STOP taking these medications       phenazopyridine (PYRIDIUM) 100 MG tablet Comments:   Reason for Stopping:         nitrofurantoin, macrocrystal-monohydrate, (MACROBID) 100 MG capsule Comments:   Reason for Stopping:               Time Spent on discharge is more than 30 minutes --      TIME  INCLUDES TIME THAT WAS  SPENT WITH DISCHARGE PAPERS, MEDICATION REVIEW, MEDICATION RECONCILIATION,   PRESCRIPTIONS, CHART REVIEW, PATIENT EXAM , FINAL PROGRESS NOTE, DISCUSSION OF FINDINGS WITH PATIENT AND AVAILABLE FAMILY , AND DICTATION  WHERE NEEDED ; [facility]) FORMS COMPLETED ; N-17  COMPLETION ;  H&P UPDATED ; DURABLE EQUIPMENT FORMS.      Active Hospital Problems    Diagnosis    RLS (restless legs syndrome) [G25.81]     Priority: Low    ESBL (extended spectrum beta-lactamase) producing bacteria infection [A49.9, Z16.12]    Thrombocytopenia (HCC) [D69.6]    PVD (peripheral vascular disease) (HCC) [I73.9]    CKD (chronic kidney disease) stage 3, GFR 30-59 ml/min (HCC) [N18.3]    Catheter-associated urinary tract infection (HCC) [T83.511A, N39.0]    Alzheimer disease [G30.9, F02.80]    Neurogenic bladder as late effect of cerebrovascular accident (CVA) [place], N31.9]    GERD (gastroesophageal reflux disease) [K21.9]    HTN (hypertension) [I10]    Hyperlipidemia [E78.5]       Signed:    Judith Loyola DO    6/1/2019    2:17 PM    Voice recognition software use for dictation

## 2020-01-01 ENCOUNTER — APPOINTMENT (OUTPATIENT)
Dept: GENERAL RADIOLOGY | Age: 85
DRG: 853 | End: 2020-01-01
Payer: COMMERCIAL

## 2020-01-01 ENCOUNTER — ANESTHESIA EVENT (OUTPATIENT)
Dept: OPERATING ROOM | Age: 85
DRG: 853 | End: 2020-01-01
Payer: COMMERCIAL

## 2020-01-01 ENCOUNTER — HOSPITAL ENCOUNTER (INPATIENT)
Age: 85
LOS: 1 days | DRG: 853 | End: 2020-01-21
Attending: EMERGENCY MEDICINE | Admitting: INTERNAL MEDICINE
Payer: COMMERCIAL

## 2020-01-01 ENCOUNTER — HOSPITAL ENCOUNTER (OUTPATIENT)
Age: 85
Discharge: HOME | End: 2020-01-22
Payer: COMMERCIAL

## 2020-01-01 ENCOUNTER — APPOINTMENT (OUTPATIENT)
Dept: CT IMAGING | Age: 85
DRG: 853 | End: 2020-01-01
Payer: COMMERCIAL

## 2020-01-01 ENCOUNTER — HOSPITAL ENCOUNTER (OUTPATIENT)
Age: 85
Discharge: HOME | End: 2020-01-21
Payer: COMMERCIAL

## 2020-01-01 ENCOUNTER — ANESTHESIA (OUTPATIENT)
Dept: OPERATING ROOM | Age: 85
DRG: 853 | End: 2020-01-01
Payer: COMMERCIAL

## 2020-01-01 ENCOUNTER — PREP FOR PROCEDURE (OUTPATIENT)
Dept: UROLOGY | Age: 85
End: 2020-01-01

## 2020-01-01 VITALS
SYSTOLIC BLOOD PRESSURE: 112 MMHG | BODY MASS INDEX: 33.28 KG/M2 | TEMPERATURE: 98 F | WEIGHT: 169.53 LBS | HEIGHT: 60 IN | OXYGEN SATURATION: 100 % | DIASTOLIC BLOOD PRESSURE: 39 MMHG

## 2020-01-01 VITALS — DIASTOLIC BLOOD PRESSURE: 47 MMHG | SYSTOLIC BLOOD PRESSURE: 91 MMHG | OXYGEN SATURATION: 100 %

## 2020-01-01 LAB
AADO2: 535.4 MMHG
AADO2: 561.3 MMHG
AADO2: 600 MMHG
ACINETOBACTER BAUMANNII BY PCR: NOT DETECTED
ALBUMIN SERPL-MCNC: 2.4 G/DL (ref 3.5–5.2)
ALBUMIN SERPL-MCNC: 3.1 G/DL (ref 3.5–5.2)
ALP BLD-CCNC: 187 U/L (ref 35–104)
ALP BLD-CCNC: 80 U/L (ref 35–104)
ALT SERPL-CCNC: 11 U/L (ref 0–32)
ALT SERPL-CCNC: 25 U/L (ref 0–32)
ANION GAP SERPL CALCULATED.3IONS-SCNC: 11 MMOL/L (ref 7–16)
ANION GAP SERPL CALCULATED.3IONS-SCNC: 13 MMOL/L (ref 7–16)
ANION GAP SERPL CALCULATED.3IONS-SCNC: 18 MMOL/L (ref 7–16)
ANION GAP SERPL CALCULATED.3IONS-SCNC: 22 MMOL/L (ref 7–16)
ANION GAP SERPL CALCULATED.3IONS-SCNC: 30 MMOL/L (ref 7–16)
AST SERPL-CCNC: 10 U/L (ref 0–31)
AST SERPL-CCNC: 46 U/L (ref 0–31)
B.E.: -15.8 MMOL/L
B.E.: -16 MMOL/L (ref -3–3)
B.E.: -16.1 MMOL/L (ref -3–3)
B.E.: -16.3 MMOL/L (ref -3–3)
B.E.: -18.9 MMOL/L (ref -3–3)
BACTERIA: ABNORMAL /HPF
BACTERIA: ABNORMAL /HPF
BASOPHILS ABSOLUTE: 0 E9/L (ref 0–0.2)
BASOPHILS ABSOLUTE: 0 E9/L (ref 0–0.2)
BASOPHILS RELATIVE PERCENT: 0 % (ref 0–2)
BASOPHILS RELATIVE PERCENT: 0 % (ref 0–2)
BILIRUB SERPL-MCNC: 0.6 MG/DL (ref 0–1.2)
BILIRUB SERPL-MCNC: 0.8 MG/DL (ref 0–1.2)
BILIRUBIN DIRECT: 0.8 MG/DL (ref 0–0.3)
BILIRUBIN URINE: NEGATIVE
BILIRUBIN URINE: NEGATIVE
BILIRUBIN, INDIRECT: 0 MG/DL (ref 0–1)
BLOOD, URINE: ABNORMAL
BLOOD, URINE: ABNORMAL
BOTTLE TYPE: ABNORMAL
BUN BLDV-MCNC: 28 MG/DL (ref 8–23)
BUN BLDV-MCNC: 37 MG/DL (ref 8–23)
BUN BLDV-MCNC: 38 MG/DL (ref 8–23)
BUN BLDV-MCNC: 38 MG/DL (ref 8–23)
BUN BLDV-MCNC: 39 MG/DL (ref 8–23)
BURR CELLS: ABNORMAL
BURR CELLS: ABNORMAL
CALCIUM SERPL-MCNC: 7.3 MG/DL (ref 8.6–10.2)
CALCIUM SERPL-MCNC: 7.3 MG/DL (ref 8.6–10.2)
CALCIUM SERPL-MCNC: 8.2 MG/DL (ref 8.6–10.2)
CALCIUM SERPL-MCNC: 9 MG/DL (ref 8.6–10.2)
CALCIUM SERPL-MCNC: 9.1 MG/DL (ref 8.6–10.2)
CANDIDA ALBICANS BY PCR: NOT DETECTED
CANDIDA GLABRATA BY PCR: NOT DETECTED
CANDIDA KRUSEI BY PCR: NOT DETECTED
CANDIDA PARAPSILOSIS BY PCR: NOT DETECTED
CANDIDA TROPICALIS BY PCR: NOT DETECTED
CARBAPENEM RESISTANCE KPC BY PCR: NOT DETECTED
CHLORIDE BLD-SCNC: 101 MMOL/L (ref 98–107)
CHLORIDE BLD-SCNC: 103 MMOL/L (ref 98–107)
CHLORIDE BLD-SCNC: 106 MMOL/L (ref 98–107)
CHLORIDE BLD-SCNC: 97 MMOL/L (ref 98–107)
CHLORIDE BLD-SCNC: 99 MMOL/L (ref 98–107)
CHOLESTEROL, TOTAL: 65 MG/DL (ref 0–199)
CHP ED QC CHECK: NORMAL
CLARITY: ABNORMAL
CLARITY: ABNORMAL
CO2: 11 MMOL/L (ref 22–29)
CO2: 14 MMOL/L (ref 22–29)
CO2: 19 MMOL/L (ref 22–29)
CO2: 22 MMOL/L (ref 22–29)
CO2: 24 MMOL/L (ref 22–29)
COHB: 0 % (ref 0–1.5)
COHB: 0.3 % (ref 0–1.5)
COHB: 0.4 % (ref 0–1.5)
COHB: 0.4 % (ref 0–1.5)
COHB: 0.8 % (ref 0–1.5)
COLOR: YELLOW
COLOR: YELLOW
COMMENT: ABNORMAL
CORTISOL TOTAL: 38.29 MCG/DL (ref 2.68–18.4)
CREAT SERPL-MCNC: 2 MG/DL (ref 0.5–1)
CREAT SERPL-MCNC: 2.7 MG/DL (ref 0.5–1)
CREAT SERPL-MCNC: 2.7 MG/DL (ref 0.5–1)
CREAT SERPL-MCNC: 2.8 MG/DL (ref 0.5–1)
CREAT SERPL-MCNC: 2.8 MG/DL (ref 0.5–1)
CRITICAL: ABNORMAL
DATE ANALYZED: ABNORMAL
DATE OF COLLECTION: ABNORMAL
DOHLE BODIES: ABNORMAL
EKG ATRIAL RATE: 73 BPM
EKG P AXIS: 43 DEGREES
EKG P-R INTERVAL: 126 MS
EKG Q-T INTERVAL: 394 MS
EKG QRS DURATION: 136 MS
EKG QTC CALCULATION (BAZETT): 434 MS
EKG R AXIS: 62 DEGREES
EKG T AXIS: -86 DEGREES
EKG VENTRICULAR RATE: 73 BPM
ENTEROBACTER CLOACAE COMPLEX BY PCR: NOT DETECTED
ENTEROBACTERALES BY PCR: DETECTED
ENTEROCOCCUS BY PCR: DETECTED
EOSINOPHILS ABSOLUTE: 0 E9/L (ref 0.05–0.5)
EOSINOPHILS ABSOLUTE: 0 E9/L (ref 0.05–0.5)
EOSINOPHILS RELATIVE PERCENT: 0 % (ref 0–6)
EOSINOPHILS RELATIVE PERCENT: 0 % (ref 0–6)
ESCHERICHIA COLI BY PCR: DETECTED
FIO2: 100 %
GFR AFRICAN AMERICAN: 19
GFR AFRICAN AMERICAN: 19
GFR AFRICAN AMERICAN: 20
GFR AFRICAN AMERICAN: 20
GFR AFRICAN AMERICAN: 28
GFR NON-AFRICAN AMERICAN: 16 ML/MIN/1.73
GFR NON-AFRICAN AMERICAN: 16 ML/MIN/1.73
GFR NON-AFRICAN AMERICAN: 17 ML/MIN/1.73
GFR NON-AFRICAN AMERICAN: 17 ML/MIN/1.73
GFR NON-AFRICAN AMERICAN: 23 ML/MIN/1.73
GLUCOSE BLD-MCNC: 153 MG/DL (ref 74–99)
GLUCOSE BLD-MCNC: 163 MG/DL (ref 74–99)
GLUCOSE BLD-MCNC: 163 MG/DL (ref 74–99)
GLUCOSE BLD-MCNC: 166 MG/DL
GLUCOSE BLD-MCNC: 324 MG/DL (ref 74–99)
GLUCOSE BLD-MCNC: 97 MG/DL (ref 74–99)
GLUCOSE URINE: NEGATIVE MG/DL
GLUCOSE URINE: NEGATIVE MG/DL
HAEMOPHILUS INFLUENZAE BY PCR: NOT DETECTED
HBA1C MFR BLD: 6.3 % (ref 4–5.6)
HCO3: 11.2 MMOL/L (ref 22–26)
HCO3: 12.3 MMOL/L (ref 22–26)
HCO3: 12.6 MMOL/L (ref 22–26)
HCO3: 13.8 MMOL/L
HCO3: 14.4 MMOL/L (ref 22–26)
HCT VFR BLD CALC: 28 % (ref 34–48)
HCT VFR BLD CALC: 30.2 % (ref 34–48)
HCT VFR BLD CALC: 30.5 % (ref 34–48)
HCT VFR BLD CALC: 31.6 % (ref 34–48)
HDLC SERPL-MCNC: 10 MG/DL
HEMOGLOBIN: 8.4 G/DL (ref 11.5–15.5)
HEMOGLOBIN: 9.3 G/DL (ref 11.5–15.5)
HEMOGLOBIN: 9.4 G/DL (ref 11.5–15.5)
HEMOGLOBIN: 9.9 G/DL (ref 11.5–15.5)
HHB: 12.4 % (ref 0–5)
HHB: 3.5 % (ref 0–5)
HHB: 35 %
HHB: 5.3 % (ref 0–5)
HHB: 5.9 % (ref 0–5)
INFLUENZA A BY PCR: NOT DETECTED
INFLUENZA B BY PCR: NOT DETECTED
KETONES, URINE: NEGATIVE MG/DL
KETONES, URINE: NEGATIVE MG/DL
KLEBSIELLA OXYTOCA BY PCR: NOT DETECTED
KLEBSIELLA PNEUMONIAE GROUP BY PCR: NOT DETECTED
LAB: ABNORMAL
LACTIC ACID, SEPSIS: 3.6 MMOL/L (ref 0.5–1.9)
LACTIC ACID: 12.4 MMOL/L (ref 0.5–2.2)
LACTIC ACID: 14.6 MMOL/L (ref 0.5–2.2)
LACTIC ACID: 18.9 MMOL/L (ref 0.5–2.2)
LACTIC ACID: 3.6 MMOL/L (ref 0.5–2.2)
LACTIC ACID: 8.8 MMOL/L (ref 0.5–2.2)
LDL CHOLESTEROL CALCULATED: 2 MG/DL (ref 0–99)
LEUKOCYTE ESTERASE, URINE: ABNORMAL
LEUKOCYTE ESTERASE, URINE: ABNORMAL
LISTERIA MONOCYTOGENES BY PCR: NOT DETECTED
LV EF: 39 %
LVEF MODALITY: NORMAL
LYMPHOCYTES ABSOLUTE: 0.31 E9/L (ref 1.5–4)
LYMPHOCYTES ABSOLUTE: 1.58 E9/L (ref 1.5–4)
LYMPHOCYTES RELATIVE PERCENT: 5 % (ref 20–42)
LYMPHOCYTES RELATIVE PERCENT: 8 % (ref 20–42)
Lab: ABNORMAL
MAGNESIUM: 1.3 MG/DL (ref 1.6–2.6)
MAGNESIUM: 1.4 MG/DL (ref 1.6–2.6)
MCH RBC QN AUTO: 26.9 PG (ref 26–35)
MCH RBC QN AUTO: 27 PG (ref 26–35)
MCH RBC QN AUTO: 27.1 PG (ref 26–35)
MCH RBC QN AUTO: 27.7 PG (ref 26–35)
MCHC RBC AUTO-ENTMCNC: 30 % (ref 32–34.5)
MCHC RBC AUTO-ENTMCNC: 30.8 % (ref 32–34.5)
MCHC RBC AUTO-ENTMCNC: 30.8 % (ref 32–34.5)
MCHC RBC AUTO-ENTMCNC: 31.3 % (ref 32–34.5)
MCV RBC AUTO: 86.6 FL (ref 80–99.9)
MCV RBC AUTO: 87.4 FL (ref 80–99.9)
MCV RBC AUTO: 87.5 FL (ref 80–99.9)
MCV RBC AUTO: 92.4 FL (ref 80–99.9)
METAMYELOCYTES RELATIVE PERCENT: 6 % (ref 0–1)
METER GLUCOSE: 166 MG/DL (ref 74–99)
METER GLUCOSE: 313 MG/DL (ref 74–99)
METHB: 0.2 % (ref 0–1.5)
METHB: 0.3 % (ref 0–1.5)
METHB: 0.4 % (ref 0–1.5)
MODE: ABNORMAL
MONOCYTES ABSOLUTE: 0.12 E9/L (ref 0.1–0.95)
MONOCYTES ABSOLUTE: 0.59 E9/L (ref 0.1–0.95)
MONOCYTES RELATIVE PERCENT: 2 % (ref 2–12)
MONOCYTES RELATIVE PERCENT: 3 % (ref 2–12)
MYELOCYTE PERCENT: 3 % (ref 0–0)
NEISSERIA MENINGITIDIS BY PCR: NOT DETECTED
NEUTROPHILS ABSOLUTE: 17.62 E9/L (ref 1.8–7.3)
NEUTROPHILS ABSOLUTE: 5.77 E9/L (ref 1.8–7.3)
NEUTROPHILS RELATIVE PERCENT: 80 % (ref 43–80)
NEUTROPHILS RELATIVE PERCENT: 93 % (ref 43–80)
NITRITE, URINE: NEGATIVE
NITRITE, URINE: NEGATIVE
O2 CONTENT: 11.7 ML/DL
O2 CONTENT: 12.3 ML/DL
O2 CONTENT: 12.3 ML/DL
O2 CONTENT: 12.9 ML/DL
O2 CONTENT: 8.5 ML/DL
O2 SATURATION: 64.7 %
O2 SATURATION: 87.5 % (ref 92–98.5)
O2 SATURATION: 94.1 % (ref 92–98.5)
O2 SATURATION: 94.7 % (ref 92–98.5)
O2 SATURATION: 96.5 % (ref 92–98.5)
O2HB: 64.2 %
O2HB: 86.9 % (ref 94–97)
O2HB: 93.9 % (ref 94–97)
O2HB: 94.2 % (ref 94–97)
O2HB: 95.5 % (ref 94–97)
OPERATOR ID: 1190
OPERATOR ID: 1190
OPERATOR ID: 1394
ORDER NUMBER: ABNORMAL
OVALOCYTES: ABNORMAL
PATIENT TEMP: 37 C
PCO2: 33 MMHG (ref 35–45)
PCO2: 41.3 MMHG (ref 35–45)
PCO2: 49.6 MMHG
PCO2: 55.5 MMHG (ref 35–45)
PCO2: 56.8 MMHG (ref 35–45)
PDW BLD-RTO: 14.2 FL (ref 11.5–15)
PDW BLD-RTO: 14.7 FL (ref 11.5–15)
PDW BLD-RTO: 14.7 FL (ref 11.5–15)
PDW BLD-RTO: 14.9 FL (ref 11.5–15)
PEEP/CPAP: 10 CMH2O
PFO2: 0.38 MMHG/%
PFO2: 0.85 MMHG/%
PFO2: 0.96 MMHG/%
PH BLOOD GAS: 6.96 (ref 7.35–7.45)
PH BLOOD GAS: 7.02 (ref 7.35–7.45)
PH BLOOD GAS: 7.06 (ref 7.3–7.42)
PH BLOOD GAS: 7.1 (ref 7.35–7.45)
PH BLOOD GAS: 7.15 (ref 7.35–7.45)
PH UA: 7.5 (ref 5–9)
PH UA: 7.5 (ref 5–9)
PHOSPHORUS: 6 MG/DL (ref 2.5–4.5)
PLATELET # BLD: 68 E9/L (ref 130–450)
PLATELET # BLD: 77 E9/L (ref 130–450)
PLATELET # BLD: 81 E9/L (ref 130–450)
PLATELET # BLD: 89 E9/L (ref 130–450)
PLATELET CONFIRMATION: NORMAL
PMV BLD AUTO: 11.6 FL (ref 7–12)
PMV BLD AUTO: 12.4 FL (ref 7–12)
PMV BLD AUTO: 12.7 FL (ref 7–12)
PMV BLD AUTO: 14.1 FL (ref 7–12)
PO2: 111.6 MMHG (ref 60–100)
PO2: 38.4 MMHG
PO2: 74.7 MMHG (ref 60–100)
PO2: 85.4 MMHG (ref 60–100)
PO2: 95.8 MMHG (ref 60–100)
POIKILOCYTES: ABNORMAL
POIKILOCYTES: ABNORMAL
POTASSIUM REFLEX MAGNESIUM: 3.8 MMOL/L (ref 3.5–5)
POTASSIUM SERPL-SCNC: 3.8 MMOL/L (ref 3.5–5)
POTASSIUM SERPL-SCNC: 3.8 MMOL/L (ref 3.5–5)
POTASSIUM SERPL-SCNC: 3.9 MMOL/L (ref 3.5–5)
POTASSIUM SERPL-SCNC: 4.4 MMOL/L (ref 3.5–5)
PRO-BNP: 1566 PG/ML (ref 0–450)
PRO-BNP: ABNORMAL PG/ML (ref 0–450)
PROCALCITONIN: 80.74 NG/ML (ref 0–0.08)
PROTEIN UA: 100 MG/DL
PROTEIN UA: 100 MG/DL
PROTEUS BY PCR: NOT DETECTED
PSEUDOMONAS AERUGINOSA BY PCR: NOT DETECTED
RBC # BLD: 3.03 E12/L (ref 3.5–5.5)
RBC # BLD: 3.45 E12/L (ref 3.5–5.5)
RBC # BLD: 3.49 E12/L (ref 3.5–5.5)
RBC # BLD: 3.65 E12/L (ref 3.5–5.5)
RBC UA: ABNORMAL /HPF (ref 0–2)
RBC UA: ABNORMAL /HPF (ref 0–2)
RI(T): 1563 %
RI(T): 559 %
RI(T): 657 %
RR MECHANICAL: 20 B/MIN
SERRATIA MARCESCENS BY PCR: NOT DETECTED
SODIUM BLD-SCNC: 131 MMOL/L (ref 132–146)
SODIUM BLD-SCNC: 138 MMOL/L (ref 132–146)
SODIUM BLD-SCNC: 139 MMOL/L (ref 132–146)
SODIUM BLD-SCNC: 141 MMOL/L (ref 132–146)
SODIUM BLD-SCNC: 141 MMOL/L (ref 132–146)
SOURCE OF BLOOD CULTURE: ABNORMAL
SOURCE, BLOOD GAS: ABNORMAL
SPECIFIC GRAVITY UA: 1.01 (ref 1–1.03)
SPECIFIC GRAVITY UA: 1.02 (ref 1–1.03)
STAPHYLOCOCCUS AUREUS BY PCR: NOT DETECTED
STAPHYLOCOCCUS SPECIES BY PCR: NOT DETECTED
STREPTOCOCCUS AGALACTIAE BY PCR: NOT DETECTED
STREPTOCOCCUS PNEUMONIAE BY PCR: NOT DETECTED
STREPTOCOCCUS PYOGENES  BY PCR: NOT DETECTED
STREPTOCOCCUS SPECIES BY PCR: NOT DETECTED
THB: 9 G/DL (ref 11.5–16.5)
THB: 9.2 G/DL (ref 11.5–16.5)
THB: 9.4 G/DL (ref 11.5–16.5)
THB: 9.5 G/DL (ref 11.5–16.5)
THB: 9.7 G/DL (ref 11.5–16.5)
TIME ANALYZED: 144
TIME ANALYZED: 2011
TIME ANALYZED: 622
TIME ANALYZED: 913
TIME ANALYZED: 919
TOTAL PROTEIN: 4.9 G/DL (ref 6.4–8.3)
TOTAL PROTEIN: 5.3 G/DL (ref 6.4–8.3)
TOXIC GRANULATION: ABNORMAL
TRIGL SERPL-MCNC: 264 MG/DL (ref 0–149)
TROPONIN: 0.01 NG/ML (ref 0–0.03)
TSH SERPL DL<=0.05 MIU/L-ACNC: 1.38 UIU/ML (ref 0.27–4.2)
URINE CULTURE, ROUTINE: NORMAL
UROBILINOGEN, URINE: 0.2 E.U./DL
UROBILINOGEN, URINE: 0.2 E.U./DL
VACUOLATED NEUTROPHILS: ABNORMAL
VACUOLATED NEUTROPHILS: ABNORMAL
VANCOMYCIN RESISTANT BY PCR: NOT DETECTED
VLDLC SERPL CALC-MCNC: 53 MG/DL
VT MECHANICAL: 500 ML
WBC # BLD: 10.1 E9/L (ref 4.5–11.5)
WBC # BLD: 19.8 E9/L (ref 4.5–11.5)
WBC # BLD: 6.2 E9/L (ref 4.5–11.5)
WBC # BLD: 8.7 E9/L (ref 4.5–11.5)
WBC UA: >20 /HPF (ref 0–5)
WBC UA: >20 /HPF (ref 0–5)

## 2020-01-01 PROCEDURE — 2580000003 HC RX 258: Performed by: INTERNAL MEDICINE

## 2020-01-01 PROCEDURE — 81001 URINALYSIS AUTO W/SCOPE: CPT

## 2020-01-01 PROCEDURE — 83036 HEMOGLOBIN GLYCOSYLATED A1C: CPT

## 2020-01-01 PROCEDURE — 87088 URINE BACTERIA CULTURE: CPT

## 2020-01-01 PROCEDURE — 6360000002 HC RX W HCPCS: Performed by: STUDENT IN AN ORGANIZED HEALTH CARE EDUCATION/TRAINING PROGRAM

## 2020-01-01 PROCEDURE — 84484 ASSAY OF TROPONIN QUANT: CPT

## 2020-01-01 PROCEDURE — 93010 ELECTROCARDIOGRAM REPORT: CPT | Performed by: INTERNAL MEDICINE

## 2020-01-01 PROCEDURE — 99285 EMERGENCY DEPT VISIT HI MDM: CPT

## 2020-01-01 PROCEDURE — 36415 COLL VENOUS BLD VENIPUNCTURE: CPT

## 2020-01-01 PROCEDURE — 85025 COMPLETE CBC W/AUTO DIFF WBC: CPT

## 2020-01-01 PROCEDURE — 6360000002 HC RX W HCPCS: Performed by: ANESTHESIOLOGY

## 2020-01-01 PROCEDURE — 84100 ASSAY OF PHOSPHORUS: CPT

## 2020-01-01 PROCEDURE — 2580000003 HC RX 258: Performed by: STUDENT IN AN ORGANIZED HEALTH CARE EDUCATION/TRAINING PROGRAM

## 2020-01-01 PROCEDURE — 84443 ASSAY THYROID STIM HORMONE: CPT

## 2020-01-01 PROCEDURE — 71045 X-RAY EXAM CHEST 1 VIEW: CPT

## 2020-01-01 PROCEDURE — 94640 AIRWAY INHALATION TREATMENT: CPT

## 2020-01-01 PROCEDURE — 96375 TX/PRO/DX INJ NEW DRUG ADDON: CPT

## 2020-01-01 PROCEDURE — 2000000000 HC ICU R&B

## 2020-01-01 PROCEDURE — 85027 COMPLETE CBC AUTOMATED: CPT

## 2020-01-01 PROCEDURE — 83735 ASSAY OF MAGNESIUM: CPT

## 2020-01-01 PROCEDURE — 80048 BASIC METABOLIC PNL TOTAL CA: CPT

## 2020-01-01 PROCEDURE — 94664 DEMO&/EVAL PT USE INHALER: CPT

## 2020-01-01 PROCEDURE — C2617 STENT, NON-COR, TEM W/O DEL: HCPCS | Performed by: UROLOGY

## 2020-01-01 PROCEDURE — 80053 COMPREHEN METABOLIC PANEL: CPT

## 2020-01-01 PROCEDURE — 2500000003 HC RX 250 WO HCPCS: Performed by: INTERNAL MEDICINE

## 2020-01-01 PROCEDURE — 82962 GLUCOSE BLOOD TEST: CPT

## 2020-01-01 PROCEDURE — C1769 GUIDE WIRE: HCPCS | Performed by: UROLOGY

## 2020-01-01 PROCEDURE — 96365 THER/PROPH/DIAG IV INF INIT: CPT

## 2020-01-01 PROCEDURE — 82533 TOTAL CORTISOL: CPT

## 2020-01-01 PROCEDURE — 2580000003 HC RX 258: Performed by: ANESTHESIOLOGY

## 2020-01-01 PROCEDURE — 87186 SC STD MICRODIL/AGAR DIL: CPT

## 2020-01-01 PROCEDURE — 3600000003 HC SURGERY LEVEL 3 BASE: Performed by: UROLOGY

## 2020-01-01 PROCEDURE — 87040 BLOOD CULTURE FOR BACTERIA: CPT

## 2020-01-01 PROCEDURE — 6360000002 HC RX W HCPCS: Performed by: INTERNAL MEDICINE

## 2020-01-01 PROCEDURE — 37799 UNLISTED PX VASCULAR SURGERY: CPT

## 2020-01-01 PROCEDURE — 3700000000 HC ANESTHESIA ATTENDED CARE: Performed by: UROLOGY

## 2020-01-01 PROCEDURE — 83605 ASSAY OF LACTIC ACID: CPT

## 2020-01-01 PROCEDURE — 2580000003 HC RX 258

## 2020-01-01 PROCEDURE — 2709999900 HC NON-CHARGEABLE SUPPLY: Performed by: UROLOGY

## 2020-01-01 PROCEDURE — 6370000000 HC RX 637 (ALT 250 FOR IP): Performed by: INTERNAL MEDICINE

## 2020-01-01 PROCEDURE — 0T2BX0Z CHANGE DRAINAGE DEVICE IN BLADDER, EXTERNAL APPROACH: ICD-10-PCS | Performed by: UROLOGY

## 2020-01-01 PROCEDURE — 83880 ASSAY OF NATRIURETIC PEPTIDE: CPT

## 2020-01-01 PROCEDURE — 6360000002 HC RX W HCPCS: Performed by: NURSE ANESTHETIST, CERTIFIED REGISTERED

## 2020-01-01 PROCEDURE — C1758 CATHETER, URETERAL: HCPCS | Performed by: UROLOGY

## 2020-01-01 PROCEDURE — 6370000000 HC RX 637 (ALT 250 FOR IP): Performed by: STUDENT IN AN ORGANIZED HEALTH CARE EDUCATION/TRAINING PROGRAM

## 2020-01-01 PROCEDURE — 82805 BLOOD GASES W/O2 SATURATION: CPT

## 2020-01-01 PROCEDURE — 84145 PROCALCITONIN (PCT): CPT

## 2020-01-01 PROCEDURE — 80061 LIPID PANEL: CPT

## 2020-01-01 PROCEDURE — 93005 ELECTROCARDIOGRAM TRACING: CPT | Performed by: STUDENT IN AN ORGANIZED HEALTH CARE EDUCATION/TRAINING PROGRAM

## 2020-01-01 PROCEDURE — 94660 CPAP INITIATION&MGMT: CPT

## 2020-01-01 PROCEDURE — 87502 INFLUENZA DNA AMP PROBE: CPT

## 2020-01-01 PROCEDURE — 74420 UROGRAPHY RTRGR +-KUB: CPT

## 2020-01-01 PROCEDURE — 0T788DZ DILATION OF BILATERAL URETERS WITH INTRALUMINAL DEVICE, VIA NATURAL OR ARTIFICIAL OPENING ENDOSCOPIC: ICD-10-PCS | Performed by: UROLOGY

## 2020-01-01 PROCEDURE — 6360000002 HC RX W HCPCS

## 2020-01-01 PROCEDURE — 3700000001 HC ADD 15 MINUTES (ANESTHESIA): Performed by: UROLOGY

## 2020-01-01 PROCEDURE — 7100000001 HC PACU RECOVERY - ADDTL 15 MIN: Performed by: UROLOGY

## 2020-01-01 PROCEDURE — 7100000000 HC PACU RECOVERY - FIRST 15 MIN: Performed by: UROLOGY

## 2020-01-01 PROCEDURE — 82248 BILIRUBIN DIRECT: CPT

## 2020-01-01 PROCEDURE — 74176 CT ABD & PELVIS W/O CONTRAST: CPT

## 2020-01-01 PROCEDURE — 2580000003 HC RX 258: Performed by: NURSE ANESTHETIST, CERTIFIED REGISTERED

## 2020-01-01 PROCEDURE — 87081 CULTURE SCREEN ONLY: CPT

## 2020-01-01 PROCEDURE — 93306 TTE W/DOPPLER COMPLETE: CPT

## 2020-01-01 PROCEDURE — 87077 CULTURE AEROBIC IDENTIFY: CPT

## 2020-01-01 PROCEDURE — 3600000013 HC SURGERY LEVEL 3 ADDTL 15MIN: Performed by: UROLOGY

## 2020-01-01 PROCEDURE — 87150 DNA/RNA AMPLIFIED PROBE: CPT

## 2020-01-01 DEVICE — URETERAL STENT
Type: IMPLANTABLE DEVICE | Site: URETER | Status: FUNCTIONAL
Brand: POLARIS™ ULTRA

## 2020-01-01 RX ORDER — PANTOPRAZOLE SODIUM 40 MG/1
40 TABLET, DELAYED RELEASE ORAL
Status: DISCONTINUED | OUTPATIENT
Start: 2020-01-01 | End: 2020-01-01 | Stop reason: HOSPADM

## 2020-01-01 RX ORDER — MIRTAZAPINE 15 MG/1
7.5 TABLET, FILM COATED ORAL NIGHTLY
Status: DISCONTINUED | OUTPATIENT
Start: 2020-01-01 | End: 2020-01-01 | Stop reason: HOSPADM

## 2020-01-01 RX ORDER — NICOTINE POLACRILEX 4 MG
15 LOZENGE BUCCAL PRN
Status: DISCONTINUED | OUTPATIENT
Start: 2020-01-01 | End: 2020-01-01 | Stop reason: HOSPADM

## 2020-01-01 RX ORDER — SODIUM CHLORIDE, SODIUM LACTATE, POTASSIUM CHLORIDE, AND CALCIUM CHLORIDE .6; .31; .03; .02 G/100ML; G/100ML; G/100ML; G/100ML
250 INJECTION, SOLUTION INTRAVENOUS ONCE
Status: COMPLETED | OUTPATIENT
Start: 2020-01-01 | End: 2020-01-01

## 2020-01-01 RX ORDER — POTASSIUM CHLORIDE 7.45 MG/ML
10 INJECTION INTRAVENOUS ONCE
Status: COMPLETED | OUTPATIENT
Start: 2020-01-01 | End: 2020-01-01

## 2020-01-01 RX ORDER — SODIUM CHLORIDE, SODIUM LACTATE, POTASSIUM CHLORIDE, CALCIUM CHLORIDE 600; 310; 30; 20 MG/100ML; MG/100ML; MG/100ML; MG/100ML
INJECTION, SOLUTION INTRAVENOUS CONTINUOUS
Status: DISCONTINUED | OUTPATIENT
Start: 2020-01-01 | End: 2020-01-01

## 2020-01-01 RX ORDER — SODIUM CHLORIDE 9 MG/ML
INJECTION INTRAVENOUS
Status: COMPLETED
Start: 2020-01-01 | End: 2020-01-01

## 2020-01-01 RX ORDER — BISACODYL 10 MG
10 SUPPOSITORY, RECTAL RECTAL DAILY PRN
Status: DISCONTINUED | OUTPATIENT
Start: 2020-01-01 | End: 2020-01-01 | Stop reason: HOSPADM

## 2020-01-01 RX ORDER — ATORVASTATIN CALCIUM 40 MG/1
40 TABLET, FILM COATED ORAL NIGHTLY
Status: DISCONTINUED | OUTPATIENT
Start: 2020-01-01 | End: 2020-01-01 | Stop reason: HOSPADM

## 2020-01-01 RX ORDER — MAGNESIUM SULFATE IN WATER 40 MG/ML
2 INJECTION, SOLUTION INTRAVENOUS ONCE
Status: COMPLETED | OUTPATIENT
Start: 2020-01-01 | End: 2020-01-01

## 2020-01-01 RX ORDER — 0.9 % SODIUM CHLORIDE 0.9 %
500 INTRAVENOUS SOLUTION INTRAVENOUS ONCE
Status: COMPLETED | OUTPATIENT
Start: 2020-01-01 | End: 2020-01-01

## 2020-01-01 RX ORDER — DEXTROSE MONOHYDRATE 50 MG/ML
100 INJECTION, SOLUTION INTRAVENOUS PRN
Status: DISCONTINUED | OUTPATIENT
Start: 2020-01-01 | End: 2020-01-01 | Stop reason: HOSPADM

## 2020-01-01 RX ORDER — PHENYLEPHRINE HYDROCHLORIDE 10 MG/ML
INJECTION INTRAVENOUS PRN
Status: DISCONTINUED | OUTPATIENT
Start: 2020-01-01 | End: 2020-01-01 | Stop reason: SDUPTHER

## 2020-01-01 RX ORDER — SODIUM CHLORIDE 0.9 % (FLUSH) 0.9 %
10 SYRINGE (ML) INJECTION PRN
Status: DISCONTINUED | OUTPATIENT
Start: 2020-01-01 | End: 2020-01-01 | Stop reason: HOSPADM

## 2020-01-01 RX ORDER — SIMETHICONE 80 MG
80 TABLET,CHEWABLE ORAL 4 TIMES DAILY PRN
Status: DISCONTINUED | OUTPATIENT
Start: 2020-01-01 | End: 2020-01-01 | Stop reason: HOSPADM

## 2020-01-01 RX ORDER — HEPARIN SODIUM 10000 [USP'U]/ML
5000 INJECTION, SOLUTION INTRAVENOUS; SUBCUTANEOUS EVERY 8 HOURS
Status: DISCONTINUED | OUTPATIENT
Start: 2020-01-01 | End: 2020-01-01 | Stop reason: HOSPADM

## 2020-01-01 RX ORDER — EPINEPHRINE 1 MG/ML
INJECTION, SOLUTION, CONCENTRATE INTRAVENOUS
Status: DISCONTINUED
Start: 2020-01-01 | End: 2020-01-01 | Stop reason: WASHOUT

## 2020-01-01 RX ORDER — LISINOPRIL 20 MG/1
40 TABLET ORAL DAILY
Status: DISCONTINUED | OUTPATIENT
Start: 2020-01-01 | End: 2020-01-01

## 2020-01-01 RX ORDER — CHOLECALCIFEROL (VITAMIN D3) 50 MCG
5000 TABLET ORAL DAILY
Status: DISCONTINUED | OUTPATIENT
Start: 2020-01-01 | End: 2020-01-01 | Stop reason: HOSPADM

## 2020-01-01 RX ORDER — DONEPEZIL HYDROCHLORIDE 10 MG/1
10 TABLET, FILM COATED ORAL NIGHTLY
Status: DISCONTINUED | OUTPATIENT
Start: 2020-01-01 | End: 2020-01-01 | Stop reason: HOSPADM

## 2020-01-01 RX ORDER — IPRATROPIUM BROMIDE AND ALBUTEROL SULFATE 2.5; .5 MG/3ML; MG/3ML
1 SOLUTION RESPIRATORY (INHALATION)
Status: DISCONTINUED | OUTPATIENT
Start: 2020-01-01 | End: 2020-01-01 | Stop reason: HOSPADM

## 2020-01-01 RX ORDER — 0.9 % SODIUM CHLORIDE 0.9 %
20 INTRAVENOUS SOLUTION INTRAVENOUS ONCE
Status: DISCONTINUED | OUTPATIENT
Start: 2020-01-01 | End: 2020-01-01 | Stop reason: HOSPADM

## 2020-01-01 RX ORDER — LANOLIN ALCOHOL/MO/W.PET/CERES
1000 CREAM (GRAM) TOPICAL DAILY
Status: DISCONTINUED | OUTPATIENT
Start: 2020-01-01 | End: 2020-01-01 | Stop reason: HOSPADM

## 2020-01-01 RX ORDER — SODIUM CHLORIDE 9 MG/ML
INJECTION, SOLUTION INTRAVENOUS CONTINUOUS PRN
Status: DISCONTINUED | OUTPATIENT
Start: 2020-01-01 | End: 2020-01-01 | Stop reason: SDUPTHER

## 2020-01-01 RX ORDER — PROPOFOL 10 MG/ML
INJECTION, EMULSION INTRAVENOUS PRN
Status: DISCONTINUED | OUTPATIENT
Start: 2020-01-01 | End: 2020-01-01 | Stop reason: SDUPTHER

## 2020-01-01 RX ORDER — SODIUM CHLORIDE 9 MG/ML
INJECTION, SOLUTION INTRAVENOUS CONTINUOUS
Status: DISCONTINUED | OUTPATIENT
Start: 2020-01-01 | End: 2020-01-01

## 2020-01-01 RX ORDER — 0.9 % SODIUM CHLORIDE 0.9 %
250 INTRAVENOUS SOLUTION INTRAVENOUS ONCE
Status: DISCONTINUED | OUTPATIENT
Start: 2020-01-01 | End: 2020-01-01

## 2020-01-01 RX ORDER — ONDANSETRON 2 MG/ML
4 INJECTION INTRAMUSCULAR; INTRAVENOUS ONCE
Status: COMPLETED | OUTPATIENT
Start: 2020-01-01 | End: 2020-01-01

## 2020-01-01 RX ORDER — SODIUM PHOSPHATE, DIBASIC AND SODIUM PHOSPHATE, MONOBASIC 7; 19 G/133ML; G/133ML
1 ENEMA RECTAL DAILY PRN
Status: DISCONTINUED | OUTPATIENT
Start: 2020-01-01 | End: 2020-01-01 | Stop reason: HOSPADM

## 2020-01-01 RX ORDER — BUSPIRONE HYDROCHLORIDE 10 MG/1
10 TABLET ORAL 3 TIMES DAILY
Status: DISCONTINUED | OUTPATIENT
Start: 2020-01-01 | End: 2020-01-01 | Stop reason: HOSPADM

## 2020-01-01 RX ORDER — ACETAMINOPHEN 325 MG/1
650 TABLET ORAL EVERY 4 HOURS PRN
Status: DISCONTINUED | OUTPATIENT
Start: 2020-01-01 | End: 2020-01-01 | Stop reason: HOSPADM

## 2020-01-01 RX ORDER — ANALGESIC BALM 1.74; 4.06 G/29G; G/29G
OINTMENT TOPICAL 3 TIMES DAILY PRN
Status: DISCONTINUED | OUTPATIENT
Start: 2020-01-01 | End: 2020-01-01 | Stop reason: HOSPADM

## 2020-01-01 RX ORDER — AMLODIPINE BESYLATE 10 MG/1
10 TABLET ORAL DAILY
Status: DISCONTINUED | OUTPATIENT
Start: 2020-01-01 | End: 2020-01-01

## 2020-01-01 RX ORDER — SODIUM CHLORIDE 0.9 % (FLUSH) 0.9 %
10 SYRINGE (ML) INJECTION EVERY 12 HOURS SCHEDULED
Status: DISCONTINUED | OUTPATIENT
Start: 2020-01-01 | End: 2020-01-01 | Stop reason: HOSPADM

## 2020-01-01 RX ORDER — ASPIRIN 81 MG/1
81 TABLET ORAL DAILY
Status: DISCONTINUED | OUTPATIENT
Start: 2020-01-01 | End: 2020-01-01 | Stop reason: HOSPADM

## 2020-01-01 RX ORDER — DEXTROSE MONOHYDRATE 25 G/50ML
12.5 INJECTION, SOLUTION INTRAVENOUS PRN
Status: DISCONTINUED | OUTPATIENT
Start: 2020-01-01 | End: 2020-01-01 | Stop reason: HOSPADM

## 2020-01-01 RX ORDER — 0.9 % SODIUM CHLORIDE 0.9 %
250 INTRAVENOUS SOLUTION INTRAVENOUS ONCE
Status: COMPLETED | OUTPATIENT
Start: 2020-01-01 | End: 2020-01-01

## 2020-01-01 RX ORDER — SUCRALFATE 1 G/1
1 TABLET ORAL
Status: DISCONTINUED | OUTPATIENT
Start: 2020-01-01 | End: 2020-01-01 | Stop reason: HOSPADM

## 2020-01-01 RX ORDER — 0.9 % SODIUM CHLORIDE 0.9 %
1000 INTRAVENOUS SOLUTION INTRAVENOUS ONCE
Status: DISCONTINUED | OUTPATIENT
Start: 2020-01-01 | End: 2020-01-01 | Stop reason: HOSPADM

## 2020-01-01 RX ADMIN — IPRATROPIUM BROMIDE AND ALBUTEROL SULFATE 1 AMPULE: 2.5; .5 SOLUTION RESPIRATORY (INHALATION) at 21:41

## 2020-01-01 RX ADMIN — NOREPINEPHRINE BITARTRATE 5 MCG/MIN: 1 INJECTION INTRAVENOUS at 20:38

## 2020-01-01 RX ADMIN — HYDROCORTISONE SODIUM SUCCINATE 100 MG: 100 INJECTION, POWDER, FOR SOLUTION INTRAMUSCULAR; INTRAVENOUS at 22:32

## 2020-01-01 RX ADMIN — PHENYLEPHRINE HYDROCHLORIDE 200 MCG: 10 INJECTION INTRAVENOUS at 17:49

## 2020-01-01 RX ADMIN — PHENYLEPHRINE HYDROCHLORIDE 300 MCG/MIN: 10 INJECTION INTRAVENOUS at 08:40

## 2020-01-01 RX ADMIN — ONDANSETRON 4 MG: 2 INJECTION INTRAMUSCULAR; INTRAVENOUS at 14:27

## 2020-01-01 RX ADMIN — SODIUM BICARBONATE 50 MEQ: 84 INJECTION, SOLUTION INTRAVENOUS at 02:12

## 2020-01-01 RX ADMIN — PHENYLEPHRINE HYDROCHLORIDE 100 MCG: 10 INJECTION INTRAVENOUS at 17:36

## 2020-01-01 RX ADMIN — VASOPRESSIN 0.08 UNITS/MIN: 20 INJECTION INTRAVENOUS at 07:50

## 2020-01-01 RX ADMIN — SODIUM BICARBONATE 50 MEQ: 84 INJECTION, SOLUTION INTRAVENOUS at 21:53

## 2020-01-01 RX ADMIN — PHENYLEPHRINE HYDROCHLORIDE 300 MCG/MIN: 10 INJECTION INTRAVENOUS at 11:41

## 2020-01-01 RX ADMIN — POTASSIUM CHLORIDE 10 MEQ: 7.46 INJECTION, SOLUTION INTRAVENOUS at 07:51

## 2020-01-01 RX ADMIN — HEPARIN SODIUM 5000 UNITS: 10000 INJECTION, SOLUTION INTRAVENOUS; SUBCUTANEOUS at 07:51

## 2020-01-01 RX ADMIN — Medication 10 ML: at 22:46

## 2020-01-01 RX ADMIN — PHENYLEPHRINE HYDROCHLORIDE 200 MCG: 10 INJECTION INTRAVENOUS at 17:55

## 2020-01-01 RX ADMIN — MEROPENEM 500 MG: 500 INJECTION, POWDER, FOR SOLUTION INTRAVENOUS at 03:35

## 2020-01-01 RX ADMIN — SODIUM CHLORIDE 500 ML: 9 INJECTION, SOLUTION INTRAVENOUS at 00:00

## 2020-01-01 RX ADMIN — PHENYLEPHRINE HYDROCHLORIDE 300 MCG/MIN: 10 INJECTION INTRAVENOUS at 02:12

## 2020-01-01 RX ADMIN — MAGNESIUM SULFATE 2 G: 2 INJECTION INTRAVENOUS at 07:50

## 2020-01-01 RX ADMIN — SODIUM CHLORIDE 250 ML: 9 INJECTION, SOLUTION INTRAVENOUS at 22:46

## 2020-01-01 RX ADMIN — MEROPENEM 500 MG: 500 INJECTION, POWDER, FOR SOLUTION INTRAVENOUS at 16:00

## 2020-01-01 RX ADMIN — PROPOFOL 10 MG: 10 INJECTION, EMULSION INTRAVENOUS at 17:52

## 2020-01-01 RX ADMIN — NOREPINEPHRINE BITARTRATE 30 MCG/MIN: 1 INJECTION INTRAVENOUS at 06:02

## 2020-01-01 RX ADMIN — PHENYLEPHRINE HYDROCHLORIDE 300 MCG/MIN: 10 INJECTION INTRAVENOUS at 06:02

## 2020-01-01 RX ADMIN — VASOPRESSIN 0.08 UNITS/MIN: 20 INJECTION INTRAVENOUS at 11:41

## 2020-01-01 RX ADMIN — VASOPRESSIN 0.08 UNITS/MIN: 20 INJECTION INTRAVENOUS at 23:37

## 2020-01-01 RX ADMIN — PROPOFOL 30 MG: 10 INJECTION, EMULSION INTRAVENOUS at 17:34

## 2020-01-01 RX ADMIN — PROPOFOL 10 MG: 10 INJECTION, EMULSION INTRAVENOUS at 17:48

## 2020-01-01 RX ADMIN — IPRATROPIUM BROMIDE AND ALBUTEROL SULFATE 1 AMPULE: 2.5; .5 SOLUTION RESPIRATORY (INHALATION) at 16:58

## 2020-01-01 RX ADMIN — SODIUM CHLORIDE, POTASSIUM CHLORIDE, SODIUM LACTATE AND CALCIUM CHLORIDE 250 ML: 600; 310; 30; 20 INJECTION, SOLUTION INTRAVENOUS at 08:49

## 2020-01-01 RX ADMIN — VANCOMYCIN HYDROCHLORIDE 1250 MG: 10 INJECTION, POWDER, LYOPHILIZED, FOR SOLUTION INTRAVENOUS at 10:45

## 2020-01-01 RX ADMIN — PHENYLEPHRINE HYDROCHLORIDE 200 MCG: 10 INJECTION INTRAVENOUS at 17:52

## 2020-01-01 RX ADMIN — SODIUM BICARBONATE: 84 INJECTION, SOLUTION INTRAVENOUS at 08:05

## 2020-01-01 RX ADMIN — INSULIN LISPRO 4 UNITS: 100 INJECTION, SOLUTION INTRAVENOUS; SUBCUTANEOUS at 10:00

## 2020-01-01 RX ADMIN — Medication 10 ML: at 08:00

## 2020-01-01 RX ADMIN — SODIUM CHLORIDE: 9 INJECTION, SOLUTION INTRAVENOUS at 18:30

## 2020-01-01 RX ADMIN — PHENYLEPHRINE HYDROCHLORIDE 200 MCG: 10 INJECTION INTRAVENOUS at 17:41

## 2020-01-01 RX ADMIN — SODIUM CHLORIDE 500 ML: 9 INJECTION, SOLUTION INTRAVENOUS at 14:27

## 2020-01-01 RX ADMIN — SODIUM BICARBONATE: 84 INJECTION, SOLUTION INTRAVENOUS at 22:34

## 2020-01-01 RX ADMIN — VASOPRESSIN 0.08 UNITS/MIN: 20 INJECTION INTRAVENOUS at 03:04

## 2020-01-01 RX ADMIN — PHENYLEPHRINE HYDROCHLORIDE 100 MCG/MIN: 10 INJECTION INTRAVENOUS at 19:10

## 2020-01-01 RX ADMIN — PHENYLEPHRINE HYDROCHLORIDE 200 MCG: 10 INJECTION INTRAVENOUS at 17:38

## 2020-01-01 RX ADMIN — GENTAMICIN SULFATE 180 MG: 40 INJECTION, SOLUTION INTRAMUSCULAR; INTRAVENOUS at 10:45

## 2020-01-01 RX ADMIN — PHENYLEPHRINE HYDROCHLORIDE 200 MCG: 10 INJECTION INTRAVENOUS at 17:43

## 2020-01-01 RX ADMIN — HYDROCORTISONE SODIUM SUCCINATE 100 MG: 100 INJECTION, POWDER, FOR SOLUTION INTRAMUSCULAR; INTRAVENOUS at 06:15

## 2020-01-01 RX ADMIN — SODIUM BICARBONATE 50 MEQ: 84 INJECTION, SOLUTION INTRAVENOUS at 02:11

## 2020-01-01 RX ADMIN — PHENYLEPHRINE HYDROCHLORIDE 200 MCG: 10 INJECTION INTRAVENOUS at 17:45

## 2020-01-01 RX ADMIN — SODIUM CHLORIDE 10 ML: 9 INJECTION, SOLUTION INTRAMUSCULAR; INTRAVENOUS; SUBCUTANEOUS at 22:35

## 2020-01-01 RX ADMIN — SODIUM CHLORIDE: 9 INJECTION, SOLUTION INTRAVENOUS at 17:27

## 2020-01-01 RX ADMIN — SODIUM CHLORIDE, POTASSIUM CHLORIDE, SODIUM LACTATE AND CALCIUM CHLORIDE: 600; 310; 30; 20 INJECTION, SOLUTION INTRAVENOUS at 22:46

## 2020-01-01 ASSESSMENT — ENCOUNTER SYMPTOMS
DIARRHEA: 0
RHINORRHEA: 0
SHORTNESS OF BREATH: 0
ABDOMINAL PAIN: 1
COUGH: 0
NAUSEA: 1
SORE THROAT: 0
BACK PAIN: 0
VOMITING: 1

## 2020-01-01 ASSESSMENT — PAIN SCALES - PAIN ASSESSMENT IN ADVANCED DEMENTIA (PAINAD)
TOTALSCORE: 1
BREATHING: 0
BREATHING: 0
FACIALEXPRESSION: 1
NEGVOCALIZATION: 1
TOTALSCORE: 4
BODYLANGUAGE: 0
CONSOLABILITY: 0
BODYLANGUAGE: 1
FACIALEXPRESSION: 0
NEGVOCALIZATION: 2
CONSOLABILITY: 0

## 2020-01-01 ASSESSMENT — PULMONARY FUNCTION TESTS
PIF_VALUE: 1
PIF_VALUE: 0
PIF_VALUE: 1
PIF_VALUE: 0
PIF_VALUE: 1
PIF_VALUE: 0
PIF_VALUE: 0
PIF_VALUE: 2
PIF_VALUE: 0
PIF_VALUE: 0
PIF_VALUE: 1
PIF_VALUE: 0
PIF_VALUE: 1
PIF_VALUE: 0
PIF_VALUE: 1
PIF_VALUE: 2
PIF_VALUE: 1

## 2020-01-01 ASSESSMENT — PAIN SCALES - GENERAL
PAINLEVEL_OUTOF10: 0
PAINLEVEL_OUTOF10: 6
PAINLEVEL_OUTOF10: 1
PAINLEVEL_OUTOF10: 0

## 2020-01-01 ASSESSMENT — PAIN DESCRIPTION - PAIN TYPE: TYPE: ACUTE PAIN

## 2020-01-01 ASSESSMENT — PAIN DESCRIPTION - DESCRIPTORS: DESCRIPTORS: BURNING

## 2020-01-01 ASSESSMENT — PAIN DESCRIPTION - LOCATION: LOCATION: ABDOMEN

## 2020-01-01 ASSESSMENT — PAIN DESCRIPTION - ORIENTATION: ORIENTATION: LEFT;LOWER

## 2020-01-20 PROBLEM — R65.20 SEVERE SEPSIS (HCC): Status: ACTIVE | Noted: 2020-01-01

## 2020-01-20 PROBLEM — A41.9 SEPSIS (HCC): Status: ACTIVE | Noted: 2020-01-01

## 2020-01-20 PROBLEM — A41.9 SEVERE SEPSIS (HCC): Status: ACTIVE | Noted: 2020-01-01

## 2020-01-20 PROBLEM — N17.9 AKI (ACUTE KIDNEY INJURY) (HCC): Status: ACTIVE | Noted: 2020-01-01

## 2020-01-20 NOTE — ED PROVIDER NOTES
Zenaida Kimball is a 80 y.o. female with a PMHx significant for GERD, CKD, Alzheimer's dementia, ESBL, CVA causing left-sided neuro deficits who presents for evaluation of fever. Patient coming from nursing home facility for concern of sepsis. Patient with history of ESBL in urine. Per nursing home the patient has had nausea, vomiting, diarrhea all weekend. She was given IV fluids here over the weekend. Temp of 100.3 so she was sent in for evaluation. The patient states that she currently does not feel nauseated but did have some nausea over the weekend with vomiting. She is currently awake, alert, oriented to person place and time. Patient currently denies any pain, shortness of breath, chest pain, dizziness, lightheadedness. The history is provided by the patient, medical records, the EMS personnel and the nursing home. Fever   Max temp prior to arrival:  100.3  Severity:  Moderate  Onset quality:  Gradual  Timing:  Constant  Progression:  Worsening  Associated symptoms: nausea and vomiting    Associated symptoms: no chest pain, no chills, no congestion, no cough, no diarrhea, no dysuria, no headaches, no rash, no rhinorrhea and no sore throat          Review of Systems   Constitutional: Negative for chills, diaphoresis and fever. HENT: Negative for congestion, rhinorrhea and sore throat. Eyes: Negative for visual disturbance. Respiratory: Negative for cough and shortness of breath. Cardiovascular: Negative for chest pain. Gastrointestinal: Positive for abdominal pain, nausea and vomiting. Negative for diarrhea. Genitourinary: Negative for dysuria and urgency. Musculoskeletal: Negative for back pain. Skin: Negative for rash. Neurological: Negative for dizziness, light-headedness, numbness and headaches. Physical Exam  Vitals signs and nursing note reviewed. Constitutional:       General: She is not in acute distress. Appearance: Normal appearance. She is well-developed. --------------------------------------------- PAST HISTORY ---------------------------------------------  Past Medical History:  has a past medical history of Alzheimer disease ([facility] Arthritis, Blood circulation, collateral, Catheter-associated urinary tract infection ([facility] Cerebrovascular accident (CVA) determined by clinical assessment ([facility] CKD (chronic kidney disease) stage 3, GFR 30-59 ml/min ([facility] Difficulty walking, Dysphagia, ESBL (extended spectrum beta-lactamase) producing bacteria infection, Generalized anxiety disorder, GERD (gastroesophageal reflux disease), Hemiplegia and hemiparesis following cerebral infarction affecting left non-dominant side ([facility] Hyperlipidemia, Hypertension, Insomnia, Iron deficiency anemia, Major depressive disorder, recurrent episode, severe, with psychosis ([facility] Moderate single current episode of major depressive disorder ([facility] Movement disorder, Muscle wasting and atrophy, not elsewhere classified, multiple sites, Neurogenic bladder as late effect of cerebrovascular accident (CVA), Other forms of scoliosis, thoracic region, Other specified forms of tremor, Psychiatric problem, PVD (peripheral vascular disease) ([facility] Restless leg syndrome, Thrombocytopenia ([facility] Unspecified dementia with behavioral disturbance ([facility] Urinary retention, and UTI (urinary tract infection). Past Surgical History:  has a past surgical history that includes Hysterectomy; Spine surgery; Appendectomy; Bladder surgery; back surgery; Colonoscopy; eye surgery; Endoscopy, colon, diagnostic; and Cystoscopy (N/A, 3/15/2019). Social History:  reports that she has never smoked. She has never used smokeless tobacco. She reports that she does not drink alcohol or use drugs. Family History: family history is not on file. The patients home medications have been reviewed.     Allergies: Penicillins    -------------------------------------------------- RESULTS -------------------------------------------------    Lab  Results for orders placed or performed during the hospital encounter of 01/20/20   Culture blood #1   Result Value Ref Range    Blood Culture, Routine (A)      Gram stain performed from blood culture bottle media  Gram negative rods     Culture blood #2   Result Value Ref Range    Culture, Blood 2 (A)      Gram stain performed from blood culture bottle media  Gram negative rods     RAPID INFLUENZA A/B ANTIGENS   Result Value Ref Range    Influenza A by PCR Not Detected Not Detected    Influenza B by PCR Not Detected Not Detected   Lactate, Sepsis   Result Value Ref Range    Lactic Acid, Sepsis 3.6 (H) 0.5 - 1.9 mmol/L   CBC Auto Differential   Result Value Ref Range    WBC 6.2 4.5 - 11.5 E9/L    RBC 3.45 (L) 3.50 - 5.50 E12/L    Hemoglobin 9.3 (L) 11.5 - 15.5 g/dL    Hematocrit 30.2 (L) 34.0 - 48.0 %    MCV 87.5 80.0 - 99.9 fL    MCH 27.0 26.0 - 35.0 pg    MCHC 30.8 (L) 32.0 - 34.5 %    RDW 14.7 11.5 - 15.0 fL    Platelets 77 (L) 561 - 450 E9/L    MPV 12.7 (H) 7.0 - 12.0 fL    Neutrophils % 93.0 (H) 43.0 - 80.0 %    Lymphocytes % 5.0 (L) 20.0 - 42.0 %    Monocytes % 2.0 2.0 - 12.0 %    Eosinophils % 0.0 0.0 - 6.0 %    Basophils % 0.0 0.0 - 2.0 %    Neutrophils Absolute 5.77 1.80 - 7.30 E9/L    Lymphocytes Absolute 0.31 (L) 1.50 - 4.00 E9/L    Monocytes Absolute 0.12 0.10 - 0.95 E9/L    Eosinophils Absolute 0.00 (L) 0.05 - 0.50 E9/L    Basophils Absolute 0.00 0.00 - 0.20 E9/L    Vacuolated Neutrophils 1+     Poikilocytes 2+     [place] Cells 2+     Ovalocytes 1+    Comprehensive Metabolic Panel w/ Reflex to MG   Result Value Ref Range    Sodium 131 (L) 132 - 146 mmol/L    Potassium reflex Magnesium 3.8 3.5 - 5.0 mmol/L    Chloride 99 98 - 107 mmol/L    CO2 19 (L) 22 - 29 mmol/L    Anion Gap 13 7 - 16 mmol/L    Glucose 163 (H) 74 - 99 mg/dL    BUN 39 (H) 8 - 23 mg/dL    CREATININE 2.7 (H) 0.5 - 1.0 mg/dL    GFR Non-African American 17 >=60 mL/min/1.73    GFR African American 20     Calcium 8.2 (L) 8.6 - 10.2 mg/dL    Total Protein 5.3 (L) 6.4 - 8.3 g/dL    Alb 3.1 (L) 3.5 - 5.2 g/dL    Total Bilirubin 0.6 0.0 - 1.2 mg/dL    Alkaline Phosphatase 80 35 - 104 U/L    ALT 11 0 - 32 U/L    AST 10 0 - 31 U/L   Troponin   Result Value Ref Range    Troponin 0.01 0.00 - 0.03 ng/mL   Urinalysis, reflex to microscopic   Result Value Ref Range    Color, UA Yellow Straw/Yellow    Clarity, UA SL CLOUDY Clear    Glucose, Ur Negative Negative mg/dL    Bilirubin Urine Negative Negative    Ketones, Urine Negative Negative mg/dL    Specific Gravity, UA 1.015 1.005 - 1.030    Blood, Urine LARGE (A) Negative    pH, UA 7.5 5.0 - 9.0    Protein,  (A) Negative mg/dL    Urobilinogen, Urine 0.2 <2.0 E.U./dL    Nitrite, Urine Negative Negative    Leukocyte Esterase, Urine LARGE (A) Negative   Lactic Acid, Plasma   Result Value Ref Range    Lactic Acid 3.6 (H) 0.5 - 2.2 mmol/L   Brain Natriuretic Peptide   Result Value Ref Range    Pro-BNP 1,566 (H) 0 - 450 pg/mL   Microscopic Urinalysis   Result Value Ref Range    WBC, UA >20 0 - 5 /HPF    RBC, UA 10-20 (A) 0 - 2 /HPF    Bacteria, UA MANY (A) /HPF   Platelet Confirmation   Result Value Ref Range    Platelet Confirmation CONFIRMED    Blood Gas, Arterial   Result Value Ref Range    Date Analyzed 20200120     Time Analyzed 2011     Source: Blood Arterial     pH, Blood Gas 7.150 (LL) 7.350 - 7.450    PCO2 33.0 (L) 35.0 - 45.0 mmHg    PO2 111.6 (H) 60.0 - 100.0 mmHg    HCO3 11.2 (L) 22.0 - 26.0 mmol/L    B.E. -16.3 (L) -3.0 - 3.0 mmol/L    O2 Sat 96.5 92.0 - 98.5 %    O2Hb 95.5 94.0 - 97.0 %    COHb 0.8 0.0 - 1.5 %    MetHb 0.2 0.0 - 1.5 %    O2 Content 12.3 mL/dL    HHb 3.5 0.0 - 5.0 %    tHb (est) 9.0 (L) 11.5 - 16.5 g/dL    Mode SFM  8 L     Date Of Collection      Time Collected      Pt Temp 37.0 C     ID [id number]     Lab [id number]     Critical(s) Notified Handed report to Dr/RN    Lactic acid, plasma   Result Value Ref Range    Lactic Acid 8.8 (HH) 0.5 - 2.2 mmol/L   Lactic acid, plasma   Result Value Ref Range    Lactic Acid 12.4 (HH) 0.5 - 2.2 mmol/L   Lactic acid, plasma   Result Value Ref Range    Lactic Acid 14.6 (HH) 0.5 - 2.2 mmol/L   Basic metabolic panel   Result Value Ref Range    Sodium 139 132 - 146 mmol/L    Potassium 3.8 3.5 - 5.0 mmol/L    Chloride 106 98 - 107 mmol/L    CO2 11 (L) 22 - 29 mmol/L    Anion Gap 22 (H) 7 - 16 mmol/L    Glucose 97 74 - 99 mg/dL    BUN 38 (H) 8 - 23 mg/dL    CREATININE 2.8 (H) 0.5 - 1.0 mg/dL    GFR Non-African American 16 >=60 mL/min/1.73    GFR African American 19     Calcium 7.3 (L) 8.6 - 10.2 mg/dL   Magnesium   Result Value Ref Range    Magnesium 1.3 (L) 1.6 - 2.6 mg/dL   Comprehensive metabolic panel   Result Value Ref Range    Sodium 141 132 - 146 mmol/L    Potassium 3.8 3.5 - 5.0 mmol/L    Chloride 97 (L) 98 - 107 mmol/L    CO2 14 (L) 22 - 29 mmol/L    Anion Gap 30 (H) 7 - 16 mmol/L    Glucose 324 (H) 74 - 99 mg/dL    BUN 38 (H) 8 - 23 mg/dL    CREATININE 2.8 (H) 0.5 - 1.0 mg/dL    GFR Non-African American 16 >=60 mL/min/1.73    GFR African American 19     Calcium 7.3 (L) 8.6 - 10.2 mg/dL    Total Protein 4.9 (L) 6.4 - 8.3 g/dL    Alb 2.4 (L) 3.5 - 5.2 g/dL    Total Bilirubin 0.8 0.0 - 1.2 mg/dL    Alkaline Phosphatase 187 (H) 35 - 104 U/L    ALT 25 0 - 32 U/L    AST 46 (H) 0 - 31 U/L   Cortisol Total   Result Value Ref Range    Cortisol 38.29 (H) 2.68 - 18.40 mcg/dL   Blood Gas, Arterial   Result Value Ref Range    Date Analyzed 20200121     Time Analyzed 0144     Source: Blood Arterial     pH, Blood Gas 6.963 (LL) 7.350 - 7.450    PCO2 55.5 (H) 35.0 - 45.0 mmHg    PO2 74.7 60.0 - 100.0 mmHg    HCO3 12.3 (L) 22.0 - 26.0 mmol/L    B.E. -18.9 (L) -3.0 - 3.0 mmol/L    O2 Sat 87.5 (L) 92.0 - 98.5 %    O2Hb 86.9 (L) 94.0 - 97.0 %    COHb 0.4 0.0 - 1.5 %    MetHb 0.3 0.0 - 1.5 %    O2 Content 11.7 mL/dL    HHb 12.4 (H) 0.0 - 5.0 %    tHb (est) 9.5 (L) 11.5 - 16.5 g/dL    Mode NC-6  L     Date Of Detected    Pseudomonas aeruginosa by PCR Not Detected Not Detected    Staphylococcus by PCR Not Detected Not Detected    Streptococcus by PCR Not Detected Not Detected    Vancomycin Resistant by PCR Not Detected Not Detected    Carbapenem Resistant by PCR Not Detected Not Detected   Platelet Confirmation   Result Value Ref Range    Platelet Confirmation CONFIRMED    POCT Glucose   Result Value Ref Range    Glucose 166 mg/dL    QC OK? ok    POCT Glucose   Result Value Ref Range    Meter Glucose 166 (H) 74 - 99 mg/dL   EKG 12 Lead   Result Value Ref Range    Ventricular Rate 73 BPM    Atrial Rate 73 BPM    P-R Interval 126 ms    QRS Duration 136 ms    Q-T Interval 394 ms    QTc Calculation (Bazett) 434 ms    P Axis 43 degrees    R Axis 62 degrees    T Axis -86 degrees       Radiology  XR CHEST 1 VW   Final Result   Right subclavian central venous line catheter tip in SVC. No pneumothorax on the right on the left. CT ABDOMEN PELVIS WO CONTRAST Additional Contrast? None   Final Result   Multiple densities within both collecting systems, right UPJ and   distal left ureter. These are felt to reflect calculi although there   are is no prior evidence of urinary tract calculus disease. There is   mild left-sided hydronephrosis and hydroureter. Suprapubic cystostomy catheter. The balloon appears to be within the   bladder but is significantly anteriorly located. Significant left-sided colonic diverticulosis and chronic   diverticulitis but without acute change.       Postoperative changes and significant degenerative changes throughout   the lumbar spine         XR CHEST PORTABLE   Final Result   No acute process               FL RETROGRADE PYELOGRAM W WO KUB    (Results Pending)   XR CHEST PORTABLE    (Results Pending)   ------------------------- NURSING NOTES AND VITALS REVIEWED ---------------------------  Date / Time Roomed:  1/20/2020 12:24 PM  ED Bed Assignment:  [id number]    The nursing notes answered at this time and they are agreeable with the plan.      --------------------------------- ADDITIONAL PROVIDER NOTES ---------------------------------  Consultations:  Spoke with Dr. Yuval Baer,  They will admit this patient. Spoke with urology, they will take the patient to the OR    This patient's ED course included: a personal history and physicial examination, re-evaluation prior to disposition, multiple bedside re-evaluations, IV medications, cardiac monitoring, continuous pulse oximetry and complex medical decision making and emergency management    This patient has remained hemodynamically stable during their ED course. Please note that the withdrawal or failure to initiate urgent interventions for this patient would likely result in a life threatening deterioration or permanent disability. Accordingly this patient received 30 minutes of critical care time, excluding separately billable procedures. Clinical Impression  1. Severe sepsis ([address]    2. History of ESBL E. coli infection    3. Nephrolithiasis    4. Altered mental status, unspecified altered mental status type    5. Acute cystitis with hematuria    6. Thrombocytopenia ([address]    7. ADRI (acute kidney injury) ([address]          Disposition  Patient's disposition: Admit to operating room  Patient's condition is serious.        Marc Rowe DO  Resident  01/21/20 0091

## 2020-01-20 NOTE — PROGRESS NOTES
Received call from Dr. Fernando Barrera, phone number given to Dr. Fely Fitzgerald for update on patient. 1830: Dr. Fely Fitzgerald and CRNA at bedside. Left arterial line placed.

## 2020-01-20 NOTE — CONSULTS
Neurogenic bladder as late effect of cerebrovascular accident (CVA) 03/2019    Other forms of scoliosis, thoracic region     Other specified forms of tremor     Psychiatric problem     PVD [facility]peripheral vascular disease) ([facility]     Restless leg syndrome     Thrombocytopenia ([facility] 5/24/2019    Unspecified dementia with behavioral disturbance ([facility]     Urinary retention     UTI (urinary tract infection) 2/22/2017         Past Surgical History:   Procedure Laterality Date    APPENDECTOMY      BACK SURGERY      BLADDER SURGERY      COLONOSCOPY      CYSTOSCOPY N/A 3/15/2019    CYSTOSCOPY SUPRAPUBIC TUBE PLACEMENT performed by Alessandro Syed DO at [facility] OR    ENDOSCOPY, COLON, DIAGNOSTIC      EYE SURGERY      HYSTERECTOMY      SPINE SURGERY         Medications Prior to Admission:    Not in a hospital admission. Allergies:    Penicillins    Social History:    reports that she has never smoked. She has never used smokeless tobacco. She reports that she does not drink alcohol or use drugs. Family History:   Non-contributory to this Urological problem  family history is not on file. Review of Systems:  Unable to accurately obtain due to confusion     Physical Exam:     Vitals:  BP (!) 144/60   Pulse 108   Temp 99.4 °F (37.4 °C) (Oral)   Resp 16   Ht 5' (1.524 m)   Wt 163 lb (73.9 kg)   SpO2 93%   BMI 31.83 kg/m²     General:  Lethargic, ill, confused  HEENT:  Normocephalic, atraumatic. Lungs:  Respirations symmetric, tachypnea   Abdomen:  soft, nontender, no masses  Extremities:  No clubbing, cyanosis, or edema  Skin:  Warm and dry, no open lesions or rashes  Neuro:  There are no motor or sensory deficits in the 4 quadrant extremities   Rectal: deferred  Genitourinary: SPT draining vilma urine     Labs:     Recent Labs     01/19/20  0840 01/20/20  0530 01/20/20  1258   WBC 10.1 8.7 6.2   RBC 3.65 3.49* 3.45*   HGB 9.9* 9.4* 9.3*   HCT 31.6* 30.5* 30.2*   MCV 86.6 87.4 87.5   MCH 27.1 26.9 27.0 although there   are is no prior evidence of urinary tract calculus disease. There is   mild left-sided hydronephrosis and hydroureter.       Suprapubic cystostomy catheter.  The balloon appears to be within the   bladder but is significantly anteriorly located.       Significant left-sided colonic diverticulosis and chronic   diverticulitis but without acute change.       Postoperative changes and significant degenerative changes throughout   the lumbar spine               Assessment/plan:  Left Hydronephrosis  Left distal ureteral calc  Right UPJ calc   Bilateral renal calc   Urosepsis  Azotemia       Suresh Case will be sent to OR stat for left ureteral stent insertion, possible right stent insertion  Risks/benefits of procedure discussed with her son and daughter-in-law  Treatment Consent placed  Receiving Meropenem in ED currently   She will require second procedure in future for management of stone after infection has cleared           Electronically signed by MARIA E Banuelos CNP on 1/20/2020 at 4:21 PM

## 2020-01-20 NOTE — ANESTHESIA PRE PROCEDURE
Constipation    Historical Provider, MD Robert Canales 290 MCG CAPS capsule Take 290 mcg by mouth daily 6/14/18   Historical Provider, MD   Cholecalciferol (VITAMIN D3) 5000 units CAPS Take 5,000 Units by mouth daily 6/15/18   Historical Provider, MD   vitamin B-12 (CYANOCOBALAMIN) 100 MCG tablet Take 1,000 mcg by mouth daily    Historical Provider, MD   mirtazapine (REMERON) 7.5 MG tablet Take 1 tablet by mouth nightly 3/8/17   Angelita Razo MD   sucralfate (CARAFATE) 1 GM tablet Take 1 tablet by mouth 4 times daily (with meals and nightly) 2/27/17   Niko Haji MD   simethicone (MYLICON) 80 MG chewable tablet Take 1 tablet by mouth 4 times daily as needed for Flatulence 1/30/17   Denis Ashby MD   aspirin 81 MG EC tablet Take 81 mg by mouth daily. Historical Provider, MD       Current medications:    Current Facility-Administered Medications   Medication Dose Route Frequency Provider Last Rate Last Dose    sodium chloride flush 0.9 % injection 10 mL  10 mL Intravenous 2 times per day Alvy Billing, DO        sodium chloride flush 0.9 % injection 10 mL  10 mL Intravenous PRN Alvy Billing, DO        0.9 % sodium chloride bolus  1,000 mL Intravenous Once Alvy Billing, DO        ipratropium-albuterol (DUONEB) nebulizer solution 1 ampule  1 ampule Inhalation Q4H WA Alvy Billing, DO   1 ampule at 01/20/20 1658     Facility-Administered Medications Ordered in Other Encounters   Medication Dose Route Frequency Provider Last Rate Last Dose    phenylephrine (VAZCULEP) injection    PRN Darol Dory, APRN - CRNA   100 mcg at 01/20/20 1736    propofol injection    PRN Darol Dory, APRN - CRNA   30 mg at 01/20/20 1734    0.9 % sodium chloride infusion    Continuous PRN Darol Dory, APRN - CRNA           Allergies:     Allergies   Allergen Reactions    Penicillins        Problem List:    Patient Active Problem List   Diagnosis Code    GERD (gastroesophageal reflux disease) K21.9 of tremor     Psychiatric problem     [facility]     Restless leg syndrome     Thrombocytopenia ([facility] 5/24/2019    [facility]     Urinary retention     UTI (urinary tract infection) 2/22/2017       Past Surgical History:        Procedure Laterality Date    APPENDECTOMY      BACK SURGERY      BLADDER SURGERY      COLONOSCOPY      CYSTOSCOPY N/A 3/15/2019    CYSTOSCOPY SUPRAPUBIC TUBE PLACEMENT performed by Viviane Syed DO at [facility] OR    ENDOSCOPY, COLON, DIAGNOSTIC      EYE SURGERY      HYSTERECTOMY      SPINE SURGERY         Social History:    Social History     Tobacco Use    Smoking status: Never Smoker    Smokeless tobacco: Never Used   Substance Use Topics    Alcohol use: No                                Counseling given: Not Answered      Vital Signs (Current):   Vitals:    01/20/20 1237 01/20/20 1315 01/20/20 1428   BP: (!) 110/53 (!) 101/50 (!) 144/60   Pulse: 77 70 108   Resp: 16  16   Temp: 99.4 °F (37.4 °C)     TempSrc: Oral     SpO2: 91% 96% 93%   Weight: 163 lb (73.9 kg)     Height: 5' (1.524 m)                                                BP Readings from Last 3 Encounters:   01/20/20 (!) 144/60   01/20/20 (!) 89/50   05/26/19 (!) 148/68       NPO Status:  greater than 8 hours                                                                               BMI:   Wt Readings from Last 3 Encounters:   01/20/20 163 lb (73.9 kg)   05/26/19 163 lb (73.9 kg)   03/15/19 152 lb (68.9 kg)     Body mass index is 31.83 kg/m².     CBC:   Lab Results   Component Value Date    WBC 6.2 01/20/2020    RBC 3.45 01/20/2020    HGB 9.3 01/20/2020    HCT 30.2 01/20/2020    MCV 87.5 01/20/2020    RDW 14.7 01/20/2020    PLT 77 01/20/2020       CMP:   Lab Results   Component Value Date     01/20/2020    K 3.8 01/20/2020    CL 99 01/20/2020    CO2 19 01/20/2020    BUN 39 01/20/2020    CREATININE 2.7 01/20/2020    GFRAA 20 01/20/2020

## 2020-01-20 NOTE — OP NOTE
SURGEON: NICOLE Abad M.D.     Janine Baron: None. PREOPERATIVE DIAGNOSES: Left ureteral ureteral stone, right renal pelvis stone sepsis of urinary origin, neurogenic bladder with indwelling suprapubic catheter    POSTOPERATIVE DIAGNOSES: same     OPERATION: Cystoscopy, bilateral ureteral stent insertion, suprapubic catheter exchange    ANESTHESIA: LMAC. ESTIMATED BLOOD LOSS: Minimal.     INDICATION FOR PROCEDURE: Kristina Trotter is a 80 y.o. who  was found to have a ureteral stone with hydronephrosis and signs of sepsis. The patient is to undergo cystoscopy with stent insertion. Her son and daughter-in-law understand the risks, benefits, alternatives of the procedure, signed informed consent and agrees to proceed. PROCEDURE:   The patient was brought into the operating room and placed under anesthesia in the dorsal lithotomy position. A new 27 Citizen [place] suprapubic tube was placed after removal of the prior tube. She was prepped and draped in a sterile fashion. A 21-Citizen of [place] cystoscope with a 30-degree lens was passed through the urethra and into the bladder. The entire length of the urethra was examined and found to be without strictures or other abnormalities. The entire bladder mucosal surface was examined under 30-degree endoscopy and found to be without calculi, tumors, diverticula, or other abnormalities other than small capacity and trabeculation. The ureteral orifices were normal in size, number, and location. The entire cystoscopic exam was within normal limits. A 5 [place] open-ended catheter was passed into the left ureter. A wire was passed through the catheter and into the ureter. This was passed beyond the stone and into the renal pelvis under fluoroscopic guidance. An efflux of a large amount of purulent urine was seen and the decision was made to place a ureteral stent at this time. A 6 x 22 ureteral stent was placed over the wire and into the renal pelvis.   Purulent urine was

## 2020-01-21 PROBLEM — R65.21 SEPTIC SHOCK (HCC): Status: ACTIVE | Noted: 2020-01-01

## 2020-01-21 PROBLEM — A41.9 SEPTIC SHOCK (HCC): Status: ACTIVE | Noted: 2020-01-01

## 2020-01-21 NOTE — CONSULTS
Medical Intensive Care Unit     Felicia Ferrer  Resident History and Physical    Date and time: 1/21/2020 1:17 PM  Patient's name: Myla Kat Record Number: [id number]  Patient's account/billing number: [de-identified]  Patient's YOB: 1929  Age: 80 y.o. Date of Admission: 1/20/2020 12:24 PM  Length of stay during current admission: 1    Primary Care Physician: Donovan Wilson MD  ICU Attending Physician: Dr. Jj Heart    Code Status: Limited    Reason for ICU admission: Septic shock    History of Present Illness:     Patient is a 80-year-old female patient who presented from nursing facility for altered mental status. She has a past medical history of Alzheimer's dementia, CVA with residual left-sided weakness and a suprapubic catheter, CKD, history of ESBL. Per chart review, patient prior to have complaints of left flank pain starting on Friday, started to experience fever, nausea, vomiting, diarrhea over the weekend. In the ED, patient seemed confused. Labs were significant for CO2 19, BUN 39, creatinine 2.7, lactic acid 3.6, and WBC 6.2.  UA was positive for pyuria, hematuria, bacteriuria, and large leukocyte esterase. She was started on meropenem for past history of ESBL. CT of abdomen pelvis was done which showed obstructive uropathy with distal left uureteral caliculi and right UPJ calculi with left hydronephrosis. Urology was consulted and patient was sent to the OR for bilateral ureteral stent insertion and suprapubic catheter exchange. Postop, patient was admitted to PACU. Patient was noted to be hypotensive and nonresponsive and patient was started on pressors and transferred to MICU.      CURRENT VENTILATION STATUS:   [] Ventilator  [x] BIPAP  [] Nasal Cannula [] Room Air      IF INTUBATED, ET TUBE MARKING AT LOWER LIP:       cms    SECRETIONS   Amount:  [] Small [] Moderate  [] Large [x] None    Color:     [] White [] Colored  [] Bloody    SEDATION:  RAAS Score:  [] Propofol gtt  [] Versed gtt  [] Ativan gtt   [x] No Sedation    PARALYZED:  [] No    [] Yes    VASOPRESSORS:  [] No    [x] Yes    If yes -   [x] Levophed       [] Dopamine     [x] Vasopressin       [] Dobutamine  [x] Phenylephrine         [] Epinephrine    CENTRAL LINES:     [] No   [] Yes   (Date of Insertion:   )           If yes -     [x] Right IJ     [] Left IJ [] Right Femoral [] Left Femoral                   [] Right Subclavian [] Left Subclavian     OWUSU'S CATHETER:   [] No   [x] Yes  (Date of Insertion:   )     URINE OUTPUT:            [] Good   [] Low              [] Anuric    REVIEW OF SYSTEMS:    · Unable to obtain due to altered mental status    OBJECTIVE:     VITAL SIGNS:  BP (!) 112/39   Pulse (!) 0   Temp 98 °F (36.7 °C)   Resp (!) 0   Ht 5' (1.524 m)   Wt 169 lb 8.5 oz (76.9 kg)   SpO2 100%   BMI 33.11 kg/m²   Tmax over 24 hours:  Temp (24hrs), Av.2 °F (36.8 °C), Min:97.8 °F (36.6 °C), Max:98.8 °F (37.1 °C)      Patient Vitals for the past 6 hrs:   Temp Pulse Resp   20 1219 -- (!) 0 (!) 0   20 1218 -- (!) 39 11   20 1217 -- (!) 0 18   20 1216 -- (!) 0 (!) 0   20 1215 -- (!) 33 (!) 0   20 1214 -- (!) 31 (!) 0   20 1213 -- (!) 0 (!) 0   20 1212 -- (!) 31 22   20 1211 -- (!) 30 (!) 0   20 1210 -- (!) 35 13   20 1209 -- (!) 40 13   20 1208 -- (!) 40 (!) 5   20 1207 -- (!) 44 12   20 1206 -- (!) 44 11   20 1203 -- 57 16   20 1202 -- 60 18   20 1201 -- 63 17   20 1200 -- 65 17   20 1100 -- 100 30   20 1000 -- 103 (!) 31   20 0900 -- 103 (!) 20 0800 98 °F (36.7 °C) 98 30         Intake/Output Summary (Last 24 hours) at 2020 1317  Last data filed at 2020 1000  Gross per 24 hour   Intake 4681 ml   Output 660 ml   Net 4021 ml     Wt Readings from Last 2 Encounters:   20 169 lb 8.5 oz (76.9 kg)   19 163 lb (73.9 kg)     Body mass index is 33.11 kg/m².       PHYSICAL EXAMINATION:  General appearance - in mild to moderate distress; on BiPAP  Mental status - drowsy  Eyes - pupils equal and reactive, extraocular eye movements intact  Ears - bilateral TM's and external ear canals normal  Nose - normal and patent, no erythema, discharge or polyps  Mouth - mucous membranes moist, pharynx normal without lesions  Neck - supple, no significant adenopathy  Chest - rales noted bilaterally   Heart - normal rate, regular rhythm, normal S1, S2, no murmurs, rubs, clicks or gallops  Abdomen - soft, nontender, nondistended, no masses or organomegaly  Neurological - not examined}  Extremities - no pedal edema noted  Skin - normal coloration and turgor, no rashes, no suspicious skin lesions noted      Any additional physical findings:    MEDICATIONS:  Scheduled Meds:   sodium bicarbonate        heparin (porcine)  5,000 Units Subcutaneous Q8H    insulin lispro  0-6 Units Subcutaneous Q6H    ertapenem (INVANZ) IVPB  500 mg Intravenous Q24H    daptomycin (CUBICIN) IVPB  500 mg Intravenous Q48H    sodium chloride  20 mL Intravenous Once    sodium chloride flush  10 mL Intravenous 2 times per day    sodium chloride  1,000 mL Intravenous Once    ipratropium-albuterol  1 ampule Inhalation Q4H WA    aspirin  81 mg Oral Daily    atorvastatin  40 mg Oral Nightly    busPIRone  10 mg Oral TID    vitamin D  5,000 Units Oral Daily    donepezil  10 mg Oral Nightly    vitamin B-12  1,000 mcg Oral Daily    sucralfate  1 g Oral 4x Daily WC    mirtazapine  7.5 mg Oral Nightly    linaclotide  290 mcg Oral Daily    pantoprazole  40 mg Oral QAM AC    hydrocortisone sodium succinate PF  100 mg Intravenous Q8H     Continuous Infusions:   dextrose      EPINEPHrine infusion      phenylephrine (LITO-SYNEPHRINE) 50mg/250mL infusion 300 mcg/min (01/21/20 1141)    norepinephrine 30 mcg/min (01/21/20 0602)    IV infusion builder 100 mL/hr at 01/21/20 0805    vasopressin (Septic Shock) infusion 0.08 Units/min (01/21/20 1141)     PRN Meds:   glucose, 15 g, PRN  dextrose, 12.5 g, PRN  glucagon (rDNA), 1 mg, PRN  dextrose, 100 mL/hr, PRN  sodium chloride flush, 10 mL, PRN  bisacodyl, 10 mg, Daily PRN  fleet, 1 enema, Daily PRN  simethicone, 80 mg, 4x Daily PRN  analgesic ointment, , TID PRN  hypromellose, 1 drop, Q6H PRN  acetaminophen, 650 mg, Q4H PRN        VENT SETTINGS (Comprehensive) (if applicable):  Vent Information  Skin Assessment: Clean, dry, & intact  Vt Ordered: 500 mL  FiO2 : 100 %  Additional Respiratory  Assessments  Pulse: (!) 0  Resp: (!) 0  SpO2: 100 %  Oral Care: Mouth swabbed, Mouth suctioned    ABGs:   Recent Labs     01/21/20  0919   PH 7.062*   PCO2 49.6   PO2 38.4   HCO3 13.8   BE -15.8   O2SAT 64.7       Laboratory findings:  Complete Blood Count:   Recent Labs     01/20/20  0530 01/20/20  1258 01/21/20  0612   WBC 8.7 6.2 19.8*   HGB 9.4* 9.3* 8.4*   HCT 30.5* 30.2* 28.0*   PLT 81* 77* 68*        Last 3 Blood Glucose:   Recent Labs     01/20/20  1303 01/20/20 2030 01/21/20  0612   GLUCOSE 166 97 324*        PT/INR:  No results found for: PROTIME, INR  PTT:  No results found for: APTT, PTT    Comprehensive Metabolic Profile:   Recent Labs     01/20/20  1258 01/20/20  1303 01/20/20 2030 01/21/20  0612   *  --  139 141   K 3.8  --  3.8 3.8   CL 99  --  106 97*   CO2 19*  --  11* 14*   BUN 39*  --  38* 38*   CREATININE 2.7*  --  2.8* 2.8*   GLUCOSE 163* 166 97 324*   CALCIUM 8.2*  --  7.3* 7.3*   PROT 5.3*  --   --  4.9*   LABALBU 3.1*  --   --  2.4*   BILITOT 0.6  --   --  0.8   ALKPHOS 80  --   --  187*   AST 10  --   --  46*   ALT 11  --   --  25      Magnesium:   Lab Results   Component Value Date    MG 1.4 01/21/2020     Phosphorus:   Lab Results   Component Value Date    PHOS 6.0 01/21/2020     Ionized Calcium: No results found for: SEPIDEH   Troponin:   Recent Labs     01/20/20  1258   TROPONINI 0.01     Urinalysis: Urine dipstick shows positive for WBC's, positive for RBC's and positive for leukocytes. Micro exam: >20 WBC's per HPF, 10-20 RBC's per HPF and many+ bacteria. Microbiology:  Cultures during this admission:     Blood cultures:                 [] None drawn      [] Negative             [x]  Positive (Details: GNR )  Urine Culture:                   [] None drawn      [] Negative             []  Positive (Details:  )  Sputum Culture:               [] None drawn       [] Negative             []  Positive (Details:  )   Endotracheal aspirate:     [] None drawn       [] Negative             []  Positive (Details:  )     Other pertinent Labs:     Radiology/Imaging:   CT Abdomen/pelvis wo contrast (1/21/2020):  Multiple densities within both collecting systems, right UPJ and  distal left ureter. These are felt to reflect calculi although there  are is no prior evidence of urinary tract calculus disease. There is  mild left-sided hydronephrosis and hydroureter. Suprapubic cystostomy catheter. The balloon appears to be within the  bladder but is significantly anteriorly located. Significant left-sided colonic diverticulosis and chronic  diverticulitis but without acute change. Postoperative changes and significant degenerative changes throughout  the lumbar spine  ASSESSMENT  And PLAN:     Assessment:  1. Septic shock 2/2 urosepsis  2. Acute metabolic encephalopathy 2/2 #1  3. GNR bacteremia  4. Right UPJ calculi and left distal ureteral calculi with left hydronephrosis s/p bilateral ureteral stent insertion  5. CVA with residual left-sided weakness  6. Indwelling suprapubic catheter        Code status was discussed with son Jitendra Frias) over the phone this AM. Son states he is the POA.  Son states patient is for:  - no intubation  - no chest compression  - no defibrillation  - yes to medication for resuscitation      In the afternoon, notified by RN that patient is more altered and BP is dropping while on maximum rate on 3

## 2020-01-21 NOTE — PROGRESS NOTES
Occupational Therapy  Date:1/21/2020  Patient Name: Kalina Bryson  Room: 0214/0214-A     Occupational Therapy (OT) evaluation attempted this morning; OT evaluation held, per nursing. OT evaluation to be re-attempted at later date, as able/appropriate. Sugar Sandoval, OTR/L  License Number: [id number]

## 2020-01-21 NOTE — ANESTHESIA POSTPROCEDURE EVALUATION
Department of Anesthesiology  Postprocedure Note    Patient: Jessica Segovia  MRN: [id number]  YOB: 1929  Date of evaluation: 1/20/2020  Time:  8:14 PM     Procedure Summary     Date:  01/20/20 Room / Location:  [facility] [address] [place] [address]    Anesthesia Start:  5355 Anesthesia Stop:  0784    Procedure:  CYSTOSCOPY,  INSERTION OF BILATERAL URETERAL STENTS, EXCHANGE OF SUPRAPUBIC CATHETER (N/A Bladder) Diagnosis:  (sepsis)    Surgeon:  Jennifer Dacosta MD Responsible Provider:  Raz Luke DO    Anesthesia Type:  MAC ASA Status:  4 - Emergent          Anesthesia Type: No value filed. Isaiah Phase I: Isaiah Score: 5    Isaiah Phase II:      Last vitals: Reviewed and per EMR flowsheets. Anesthesia Post Evaluation    Patient location during evaluation: PACU  Patient participation: complete - patient participated  Level of consciousness: awake  Airway patency: patent  Nausea & Vomiting: no nausea and no vomiting  Complications: no  Cardiovascular status: hemodynamically stable (on pressors)  Respiratory status: acceptable and face mask  Hydration status: euvolemic  Comments: Patient with severe sepsis received from ER for stat surgery. Patient hypotension thru the case and in pacu. Hilton drip started. Patient to go to ICU after surgery due to sepsis. Art line and central line inserted under ultrasound guidance. ABG's drawn and chest x-ray ordered.

## 2020-01-21 NOTE — PROGRESS NOTES
Dr. Bairon Perez called into PACU, number given to Dr. Derick Lobato to update on patient. 1830: Dr. Derick Lobato and CRNA at bedside. Left arterial line placed. 1920: Dr. Derick Lobato at bedside, line placement. 1925: Dr. Emily Quiñones notifed of dark red blood from suprapubic.  Ok to irrigate

## 2020-01-21 NOTE — CONSULTS
[address] [place] [address] [facility]    Consultation Note     Admit Date: 1/20/2020 12:24 PM    Reason for Consult:   Septic shock/ polymicrobial bacteremia    Attending Physician:  Grayson Davison DO     Chief Complaint: loose BM and nausea and emesis prior to admission    HISTORY OF PRESENT ILLNESS:   The patient is a 80 y.o.  woman not known to the Infectious Diseases service. The patient is admitted from extended care facility with loose bowel movements nausea and emesis. She had underwent CT scan which showed significant obstruction in the left ureteral area with hydronephrosis and sepsis. On 20 January 2020 she underwent cystoscopy and stent placement. Once the stent was placed beyond the stone that was obstructing a tremendous amount of pus extruded along the wire and stent. Patient postoperatively became hypotensive and was transferred to intensive care. She was acidotic and currently on 3 pressors. She has grown polymicrobial organisms in the blood including E. coli and enterococcus. Patient has a history of ESBL historically. Also has had problems with recurrent urinary tract issues and has a suprapubic catheter. Is obtunded at this point but the information is obtained from the critical care team as well as review of the nursing home charts. The labs, microbiology, and radiographs were also reviewed personally. Patient is on Hilton-Synephrine, Levophed and vasopressin. She is not responding to verbal stimuli although her eyes are open. She is on BiPAP. Her BUN and creatinine have moved from 28 and 2.0-38 and 2.8. She is been resuscitated with fluids so proBNP is 33,000 up from 1566 on admission. Her UA was quite active and her white count is gone from 10 to 19.8. Unfortunately she is quite acidotic pH is 7.062  Currently she is on meropenem and vancomycin ID was asked to evaluate for possible better antibiotic combination.         Past Medical History: bicarbonate        heparin (porcine)  5,000 Units Subcutaneous Q8H    vancomycin  1,250 mg Intravenous Once    insulin lispro  0-6 Units Subcutaneous Q6H    sodium chloride flush  10 mL Intravenous 2 times per day    sodium chloride  1,000 mL Intravenous Once    ipratropium-albuterol  1 ampule Inhalation Q4H WA    aspirin  81 mg Oral Daily    atorvastatin  40 mg Oral Nightly    busPIRone  10 mg Oral TID    vitamin D  5,000 Units Oral Daily    donepezil  10 mg Oral Nightly    vitamin B-12  1,000 mcg Oral Daily    sucralfate  1 g Oral 4x Daily WC    mirtazapine  7.5 mg Oral Nightly    linaclotide  290 mcg Oral Daily    pantoprazole  40 mg Oral QAM AC    meropenem  500 mg Intravenous Q12H    hydrocortisone sodium succinate PF  100 mg Intravenous Q8H     Continuous Infusions:   dextrose      phenylephrine (LITO-SYNEPHRINE) 50mg/250mL infusion 300 mcg/min (01/21/20 1141)    norepinephrine 30 mcg/min (01/21/20 0602)    IV infusion builder 100 mL/hr at 01/21/20 0805    vasopressin (Septic Shock) infusion 0.08 Units/min (01/21/20 1141)     PRN Meds:glucose, dextrose, glucagon (rDNA), dextrose, sodium chloride flush, bisacodyl, fleet, simethicone, analgesic ointment, hypromellose, acetaminophen    Allergies:  Penicillins    Social History:   Social History     Socioeconomic History    Marital status:       Spouse name: None    Number of children: None    Years of education: None    Highest education level: None   Occupational History    None   Social Needs    Financial resource strain: None    Food insecurity:     Worry: None     Inability: None    Transportation needs:     Medical: None     Non-medical: None   Tobacco Use    Smoking status: Never Smoker    Smokeless tobacco: Never Used   Substance and Sexual Activity    Alcohol use: No    Drug use: No    Sexual activity: None   Lifestyle    Physical activity:     Days per week: None     Minutes per session: None    Stress: None pale conjunctiva  HEENT: Eyes show round, and reactive pupils. No jaundice. Moist mucous membranes, no ulcerations, no thrush. Neck: Supple to movements. No lymphadenopathy. Chest: No use of accessory muscles to breathe. Symmetrical expansion. Auscultation reveals no wheezing, crackles, or rhonchi. Decreased air exchange  Cardiovascular: S1 and S2 are rhythmic and regular. No murmurs appreciated. Abdomen: Positive bowel sounds to auscultation. Benign to palpation. No masses felt. No hepatosplenomegaly. Extremities: No clubbing, no cyanosis,  4+ edema. Musculoskeletal: Equal and symmetrical  Neurological: Unable to evaluate at this point; ever eyes are open but fails respond to verbal stimuli  Lines: peripheral      CBC+dif:  Recent Labs     01/20/20  0530  01/20/20  1258 01/21/20  0612   WBC 8.7  --  6.2 19.8*   HGB 9.4*  --  9.3* 8.4*   HCT 30.5*  --  30.2* 28.0*   MCV 87.4  --  87.5 92.4   PLT 81*  --  77* 68*   NEUTROABS  --    < > 5.77 17.62*    < > = values in this interval not displayed.      Lab Results   Component Value Date    CRP 0.7 (H) 09/25/2017    CRP <0.1 02/23/2017    CRP <0.4 04/15/2013     No results found for: [facility]  Lab Results   Component Value Date    SEDRATE 16 09/25/2017    SEDRATE 33 (H) 02/23/2017    SEDRATE 25 (H) 04/15/2013     Lab Results   Component Value Date    ALT 25 01/21/2020    AST 46 (H) 01/21/2020    ALKPHOS 187 (H) 01/21/2020    BILITOT 0.8 01/21/2020     Lab Results   Component Value Date     01/21/2020    K 3.8 01/21/2020    K 3.8 01/20/2020    CL 97 01/21/2020    CO2 14 01/21/2020    BUN 38 01/21/2020    CREATININE 2.8 01/21/2020    GFRAA 19 01/21/2020    LABGLOM 16 01/21/2020    GLUCOSE 324 01/21/2020    PROT 4.9 01/21/2020    LABALBU 2.4 01/21/2020    CALCIUM 7.3 01/21/2020    BILITOT 0.8 01/21/2020    ALKPHOS 187 01/21/2020    AST 46 01/21/2020    ALT 25 01/21/2020       No results found for: PROTIME, INR    Lab Results   Component Value Date    TSH 1.380 01/21/2020       Lab Results   Component Value Date    NITRITE positive 02/22/2017    COLORU Yellow 01/20/2020    PHUR 7.5 01/20/2020    LABCAST RARE 05/23/2019    WBCUA >20 01/20/2020    RBCUA 10-20 01/20/2020    BACTERIA MANY 01/20/2020    CLARITYU SL CLOUDY 01/20/2020    SPECGRAV 1.015 01/20/2020    LEUKOCYTESUR LARGE 01/20/2020    UROBILINOGEN 0.2 01/20/2020    BILIRUBINUR Negative 01/20/2020    BILIRUBINUR negative 02/22/2017    BLOODU LARGE 01/20/2020    GLUCOSEU Negative 01/20/2020    AMORPHOUS FEW 12/09/2018       No results found for: KQB1JGB, BEART, O1PTQTHZ, PHART, THGBART, DFY6DPX, PO2ART, TYM4BTA  Radiology:  XR CHEST PORTABLE   Final Result   Cardiomegaly. Tortuous aorta. There are patchy infiltrates seen throughout both the lung fields. Pulmonary edema could have this appearance. The chest appears to be worse in the interval.      The exam has been dictated and signed by Romina Ramirez. MARIA E Ulloa-CNP   and Adriana Pollock MD, reviewed and concurred with these findings. XR CHEST 1 VW   Final Result   Right subclavian central venous line catheter tip in SVC. No pneumothorax on the right on the left. CT ABDOMEN PELVIS WO CONTRAST Additional Contrast? None   Final Result   Multiple densities within both collecting systems, right UPJ and   distal left ureter. These are felt to reflect calculi although there   are is no prior evidence of urinary tract calculus disease. There is   mild left-sided hydronephrosis and hydroureter. Suprapubic cystostomy catheter. The balloon appears to be within the   bladder but is significantly anteriorly located. Significant left-sided colonic diverticulosis and chronic   diverticulitis but without acute change.       Postoperative changes and significant degenerative changes throughout   the lumbar spine         XR CHEST PORTABLE   Final Result   No acute process               FL RETROGRADE PYELOGRAM W WO KUB    (Results Pending)

## 2020-01-21 NOTE — PROGRESS NOTES
Stage  CI HR MAP TPRI SVI   Baseline 2.4 92 92 [id number] 26   Challenge 2.8 [phone] 27   Result (%Ä) 15.9 16.1 2.2 -11.9 5.1

## 2020-01-21 NOTE — PROGRESS NOTES
Physical Therapy    Facility/Department: [address] ICU      NAME: Donald Ortiz  : 9/15/1929  MRN: [id number]    Date of Service: 2020    Attempted to see pt for PT evaluation, hold per nursing.   Kaylah PT [id number]

## 2020-01-22 LAB — MRSA CULTURE ONLY: NORMAL

## 2020-01-23 LAB
BLOOD CULTURE, ROUTINE: ABNORMAL
CULTURE, BLOOD 2: ABNORMAL
CULTURE, BLOOD 2: ABNORMAL
ORGANISM: ABNORMAL
ORGANISM: ABNORMAL

## 2020-01-24 LAB
ORGANISM: ABNORMAL
ORGANISM: ABNORMAL
URINE CULTURE, ROUTINE: ABNORMAL
URINE CULTURE, ROUTINE: ABNORMAL

## 2020-01-24 NOTE — DISCHARGE SUMMARY
DISCHARGE SUMMARY  Patient ID:  Pradeep Flowers  [id number]  89 y.o.  9/15/1929    Admit date: 2020      Discharge date : 2020      Admission Diagnoses: Severe sepsis ([address] [A41.9, R65.20]  Severe sepsis ([address] [A41.9, R65.20]  Sepsis ([address] [A41.9]  ADRI (acute kidney injury) ([address] [N17.9]    Discharge Diagnosis  Active Problems:    GERD (gastroesophageal reflux disease)    PVD (peripheral vascular disease) (HCC)    CKD (chronic kidney disease) stage 3, GFR 30-59 ml/min (HCC)    Alzheimer disease (HCC)    Neurogenic bladder as late effect of cerebrovascular accident (CVA)    Thrombocytopenia (HCC)    Severe sepsis ([address]    Sepsis ([address]    ADRI (acute kidney injury) ([address]    Septic shock ([address]  Resolved Problems:    * No resolved hospital problems. [facility] Course:  Signed             This patient  before I was able to see her in intensive care. She was seen by intensivist as well as infectious disease; she also underwent urologic surgery with urology service. 59-year-old female patient of Dr. Shawn Gillespie who presented from nursing home to the ED with the following history--Alzheimer disease, chronic kidney disease, remote CVA with left-sided deficits; history of previous ESBL infection. At nursing home she had had temperature of 100.3 nausea vomiting diarrhea. Over the weekend she was treated nursing home with IV fluids was not improving. She was confused and directed to the ED. In the ED she presented with blood pressure of 110/53 which eventually dropped as low as 73/40. Creatinine 2.7. She was found to have a 6 mm left ureteral stone and was taken to surgery for removal of septic stone by urology. After surgery she was unresponsive and had profound hypotension. Lactic acid 3.6 platelet count 11,765. She was found to have positive blood cultures with E. coli, Enterobacter as well as enterococcus. She was placed on appropriate antibiotics.   Her proBNP went from 1000-33,000 overnight due to from the stent and sent for culture.     The same procedure repeated on the right side and a 6 x 22 ureteral stent was placed over wire and into the renal pelvis. There is no purulence from the right side.     The bladder was drained. The patient was awakened from anesthesia and taken to recovery in critical condition.     PLAN:   Fan Reis will be treated with antibiotics until the infection has resolved. At a later date and as an outpatient the stone will be managed via bilateral ureteroscopy.     Marsha Ward M.D.  1/20/2020  6:03 PM    Patient seen by ID service with following impressions and treatments--    Assessment:  · Septic shock  · Polymicrobial bacteremia  · Complicated UTI     Plan:    · Cont Invanz and Dapto adjusted for renal failure  · Prognosis at this point is dismal  · Check cultures  · Baseline ESR, CRP  · Monitor labs  · Will follow with you     Thank you for having us see this patient in consultation. I will be discussing this case with the treating physicians.        Electronically signed by Yehuda Irvin MD on 1/21/2020 at 11:50 AM        Patient was seen by intensivist with following notes--    Code status was discussed with son Amber Pea) over the phone this AM. Son states he is the POA. Son states patient is for:  - no intubation  - no chest compression  - no defibrillation  - yes to medication for resuscitation       In the afternoon, notified by RN that patient is more altered and BP is dropping while on maximum rate on 3 pressors. Family was notified. Family arrived to bedside. Pulse and heart sound was checked. No pulse or heart sound was detected. Family was at bedside. Family was updated and consoled.   Madelin Marcos MD PGY-2  Attending Physician: Dr. Damaris Yeboah  1/21/2020, 1:17 PM     I personally saw, examined and provided care for the patient. Radiographs, labs and medication list were reviewed by me independently.  I spoke with bedside nursing, therapists and 07/07/2018    Right periventricular    CKD (chronic kidney disease) stage 3, GFR 30-59 ml/min ([facility])     Difficulty walking     Dysphagia     ESBL (extended spectrum beta-lactamase) producing bacteria infection 5/25/2019    Generalized anxiety disorder     GERD (gastroesophageal reflux disease)     Hemiplegia and hemiparesis following cerebral infarction affecting left non-dominant side (HCC)     Hyperlipidemia     Hypertension     Insomnia     Iron deficiency anemia     Major depressive disorder, recurrent episode, severe, with psychosis ([address]     Moderate single current episode of major depressive disorder ([address] 3/3/2017    Movement disorder     Muscle wasting and atrophy, not elsewhere classified, multiple sites     Neurogenic bladder as late effect of cerebrovascular accident (CVA) 03/2019    Other forms of scoliosis, thoracic region     Other specified forms of tremor     Psychiatric problem     PVD (peripheral vascular disease) ([address]     Restless leg syndrome     Septic shock ([address] 1/21/2020    Thrombocytopenia ([address] 5/24/2019    Unspecified dementia with behavioral disturbance ([address]     Urinary retention     UTI (urinary tract infection) 2/22/2017       Past Surgical Hx :  Past Surgical History:   Procedure Laterality Date    APPENDECTOMY      BACK SURGERY      BLADDER SURGERY      COLONOSCOPY      CYSTOSCOPY N/A 3/15/2019    CYSTOSCOPY SUPRAPUBIC TUBE PLACEMENT performed by Sary Syed DO at [facility] OR    ENDOSCOPY, COLON, DIAGNOSTIC      EYE SURGERY      HYSTERECTOMY      LITHOTRIPSY N/A 1/20/2020    CYSTOSCOPY,  INSERTION OF BILATERAL URETERAL STENTS, EXCHANGE OF SUPRAPUBIC CATHETER performed by Jennifer Dacosta MD [facility] [id number]:   CBC:   Lab Results   Component Value Date    WBC 19.8 01/21/2020    RBC 3.03 01/21/2020    HGB 8.4 01/21/2020    HCT 28.0 01/21/2020    MCV 92.4 01/21/2020    MCH 27.7 01/21/2020    MCHC 30.0 01/21/2020    RDW 14.9 01/21/2020    PLT 68 01/21/2020    MPV 14.1 01/21/2020     CBC with Differential:    Lab Results   Component Value Date    WBC 19.8 01/21/2020    RBC 3.03 01/21/2020    HGB 8.4 01/21/2020    HCT 28.0 01/21/2020    PLT 68 01/21/2020    MCV 92.4 01/21/2020    MCH 27.7 01/21/2020    MCHC 30.0 01/21/2020    RDW 14.9 01/21/2020    SEGSPCT 69 04/15/2013    METASPCT 6.0 01/21/2020    LYMPHOPCT 8.0 01/21/2020    MONOPCT 3.0 01/21/2020    MYELOPCT 3.0 01/21/2020    BASOPCT 0.0 01/21/2020    MONOSABS 0.59 01/21/2020    LYMPHSABS 1.58 01/21/2020    EOSABS 0.00 01/21/2020    BASOSABS 0.00 01/21/2020     Hemoglobin/Hematocrit:    Lab Results   Component Value Date    HGB 8.4 01/21/2020    HCT 28.0 01/21/2020     CMP:    Lab Results   Component Value Date     01/21/2020    K 3.8 01/21/2020    K 3.8 01/20/2020    CL 97 01/21/2020    CO2 14 01/21/2020    BUN 38 01/21/2020    CREATININE 2.8 01/21/2020    GFRAA 19 01/21/2020    LABGLOM 16 01/21/2020    GLUCOSE 324 01/21/2020    PROT 4.9 01/21/2020    LABALBU 2.4 01/21/2020    CALCIUM 7.3 01/21/2020    BILITOT 0.8 01/21/2020    ALKPHOS 187 01/21/2020    AST 46 01/21/2020    ALT 25 01/21/2020     BMP:    Lab Results   Component Value Date     01/21/2020    K 3.8 01/21/2020    K 3.8 01/20/2020    CL 97 01/21/2020    CO2 14 01/21/2020    BUN 38 01/21/2020    LABALBU 2.4 01/21/2020    CREATININE 2.8 01/21/2020    CALCIUM 7.3 01/21/2020    GFRAA 19 01/21/2020    LABGLOM 16 01/21/2020    GLUCOSE 324 01/21/2020     Hepatic Function Panel:    Lab Results   Component Value Date    ALKPHOS 187 01/21/2020    ALT 25 01/21/2020    AST 46 01/21/2020    PROT 4.9 01/21/2020    BILITOT 0.8 01/21/2020    BILIDIR 0.8 01/21/2020    IBILI 0.0 01/21/2020    LABALBU 2.4 01/21/2020     Ionized Calcium:  No results found for: IONCA  Magnesium:    Lab Results   Component Value Date    MG 1.4 01/21/2020     Phosphorus:    Lab Results   Component Value Date    PHOS 6.0 01/21/2020     LDH:  No results found for: LDH  Uric Acid:  No results found for: LABURIC, URICACID  PT/INR:  No results found for: PROTIME, INR  Warfarin PT/INR:  No components found for: PTPATWAR, PTINRWAR  PTT:  No results found for: APTT, PTT[APTT}  Troponin:    Lab Results   Component Value Date    TROPONINI 0.01 01/20/2020     Last 3 Troponin:    Lab Results   Component Value Date    TROPONINI 0.01 01/20/2020    TROPONINI <0.01 05/23/2019    TROPONINI 0.01 09/04/2018     U/A:    Lab Results   Component Value Date    NITRITE positive 02/22/2017    COLORU Yellow 01/20/2020    PROTEINU 100 01/20/2020    PHUR 7.5 01/20/2020    LABCAST RARE 05/23/2019    WBCUA >20 01/20/2020    RBCUA 10-20 01/20/2020    BACTERIA MANY 01/20/2020    CLARITYU SL CLOUDY 01/20/2020    SPECGRAV 1.015 01/20/2020    LEUKOCYTESUR LARGE 01/20/2020    UROBILINOGEN 0.2 01/20/2020    BILIRUBINUR Negative 01/20/2020    BILIRUBINUR negative 02/22/2017    BLOODU LARGE 01/20/2020    GLUCOSEU Negative 01/20/2020    AMORPHOUS FEW 12/09/2018     ABG:    Lab Results   Component Value Date    PH 7.062 01/21/2020    PCO2 49.6 01/21/2020    PO2 38.4 01/21/2020    HCO3 13.8 01/21/2020    BE -15.8 01/21/2020    O2SAT 64.7 01/21/2020     HgBA1c:    Lab Results   Component Value Date    LABA1C 6.3 01/21/2020     FLP:    Lab Results   Component Value Date    TRIG 264 01/21/2020    HDL 10 01/21/2020    [address] 2 01/21/2020    LABVLDL 53 01/21/2020     TSH:    Lab Results   Component Value Date    TSH 1.380 01/21/2020     VITAMIN B12: No components found for: B12       CBC:No results for input(s): WBC, RBC, HGB, HCT, PLT, MCV, MCH, MCHC, RDW, NRBC, SEGSPCT, BANDSPCT, BLASTSPCT, METASPCT, LYMPHOPCT, PROMYELOPCT, MONOPCT, MYELOPCT, EOSPCT, BASOPCT, MONOSABS, LYMPHSABS, EOSABS, BASOSABS, DIFFTYPE in the last 72 hours. Invalid input(s): ATYLMREL       H & H :No results for input(s): HGB in the last 72 hours. TSH:No results for input(s): TSH in the last 72 hours.     GLUCOSE:No results for input(s): POCGLU in the last 72 hours. CMP:No results for input(s): NA, K, CL, CO2, BUN, CREATININE, GFRAA, AGRATIO, LABGLOM, GLUCOSE, PROT, LABALBU, CALCIUM, BILITOT, ALKPHOS, AST, ALT in the last 72 hours. Invalid input(s): GLU      BNP:No results found for: BNP    PROTIME/INR:No results for input(s): PROTIME, INR in the last 72 hours. CRP: No results for input(s): CRP in the last 72 hours. ESR:No results for input(s): SEDRATE in the last 72 hours. LIPASE , AMYLASE:  Lab Results   Component Value Date    LIPASE 14 2019      No results found for: AMYLASE    ABGs: No results found for: PHART, PO2ART, EXI1BRP    CARDIAC: No results for input(s): CKTOTAL, CKMB, CKMBINDEX, TROPONINI in the last 72 hours. Lipid Profile:   Lab Results   Component Value Date    TRIG 264 2020    HDL 10 2020    LDLCALC 2 2020    CHOL 65 2020        Ct Abdomen Pelvis Wo Contrast Additional Contrast? None    Result Date: 2020  Patient MRN:  [id number] : 9/15/1929 Age: 80 years Gender: Female Order Date:  2020 2:30 PM EXAM: CT ABDOMEN PELVIS WO CONTRAST Technique: Low-dose CT  acquisition technique included one of following options; 1 . Automated exposure control, 2. Adjustment of MA and or KV according to patient's size. 3. Use of interactive reconstruction. Dosage: 865.3 mGy-cm. INDICATION:  abdominal pain, nausea, vomiting abdominal pain, nausea, vomiting COMPARISON: None FINDINGS:  There are small bilateral pleural effusions and small areas of bibasilar atelectasis. The liver, spleen, and pancreas are normal. There are bilateral renal calculi. On the right the largest is at the ureteropelvic junction measures 1.3 cm. There is no hydronephrosis. Right ureter is of normal caliber. There are multiple calculi in the left kidney. The largest measures 1.7 cm. There is mild left-sided hydronephrosis and hydroureter. There is a 6 mm distal left ureteral calculus.  Densities in the bladder presumably reflect calculi. There is significant left-sided colonic diverticulosis and chronic diverticulitis without acute change. There is a tiny amount fluid in the pelvis. There are some right-sided diverticula as well. There is a suprapubic cystostomy tube. The balloon is in the very most anterior aspect of the bladder. There are postoperative changes in the lumbar spine. There is multilevel disc space narrowing. There is a grade 1 anterior subluxation of L4 with respect to L5 . Multiple densities within both collecting systems, right UPJ and distal left ureter. These are felt to reflect calculi although there are is no prior evidence of urinary tract calculus disease. There is mild left-sided hydronephrosis and hydroureter. Suprapubic cystostomy catheter. The balloon appears to be within the bladder but is significantly anteriorly located. Significant left-sided colonic diverticulosis and chronic diverticulitis but without acute change. Postoperative changes and significant degenerative changes throughout the lumbar spine     Xr Chest Portable    Result Date: 2020  Patient MRN: [id number] : 9/15/1929 Age:  80 years Gender: Female Order Date: 2020 5:30 AM Exam: XR CHEST PORTABLE Number of Images: 1 view Indication:   post op post op Comparison: 2020 Findings: There is a right-sided central venous catheter noted the tip is in the superior vena cava. The heart is enlarged. Lung fields demonstrate patchy bilateral infiltrates which could be related to pulmonary edema. The aorta is tortuous and ectatic     Cardiomegaly. Tortuous aorta. There are patchy infiltrates seen throughout both the lung fields. Pulmonary edema could have this appearance. The chest appears to be worse in the interval. The exam has been dictated and signed by Samuel Shankar. Ced Castillo, APRN-CNP and Dawood Ram MD, reviewed and concurred with these findings.     Xr Chest Portable    Result Date: 2020  Patient MRN: tablet by mouth nightly, Disp-30 tablet, R-3DC to SNF      lisinopril (PRINIVIL;ZESTRIL) 40 MG tablet Take 1 tablet by mouth daily, Disp-30 tablet, R-3DC to SNF      amLODIPine (NORVASC) 10 MG tablet Take 1 tablet by mouth daily, Disp-30 tablet, R-3DC to SNF      acetaminophen (TYLENOL) 325 MG tablet Take 650 mg by mouth every 4 hours as needed for Pain or FeverHistorical Med      hypromellose 0.4 % SOLN ophthalmic solution Place 1 drop into both eyes every 6 hours as neededHistorical Med      bisacodyl (DULCOLAX) 10 MG suppository Place 10 mg rectally daily as needed for ConstipationHistorical Med      LINZESS 290 MCG CAPS capsule Take 290 mcg by mouth daily, R-11, DAWHistorical Med      Cholecalciferol (VITAMIN D3) 5000 units CAPS Take 5,000 Units by mouth daily, R-11Historical Med      vitamin B-12 (CYANOCOBALAMIN) 100 MCG tablet Take 1,000 mcg by mouth dailyHistorical Med      mirtazapine (REMERON) 7.5 MG tablet Take 1 tablet by mouth nightly, Disp-30 tablet, R-0Print      sucralfate (CARAFATE) 1 GM tablet Take 1 tablet by mouth 4 times daily (with meals and nightly), Disp-120 tablet, R-3      simethicone (MYLICON) 80 MG chewable tablet Take 1 tablet by mouth 4 times daily as needed for Flatulence, Disp-90 tablet, R-3      aspirin 81 MG EC tablet Take 81 mg by mouth daily. Time Spent on discharge is more than 45 minutes --      TIME  INCLUDES TIME THAT WAS  SPENT WITH DISCHARGE PAPERS, MEDICATION REVIEW, MEDICATION RECONCILIATION,   PRESCRIPTIONS, CHART REVIEW, PATIENT EXAM , FINAL PROGRESS NOTE, DISCUSSION OF FINDINGS WITH PATIENT AND AVAILABLE FAMILY , AND DICTATION  WHERE NEEDED ; Home Health Care [facility]) FORMS COMPLETED ; N-17  COMPLETION ;  H&P UPDATED ; DURABLE EQUIPMENT FORMS.      Active Hospital Problems    Diagnosis    Septic shock ([facility] [A41.9, R65.21]    Severe sepsis ([facility] [A41.9, R65.20]    Sepsis ([address] [A41.9]    ADRI (acute kidney injury) ([address] [N17.9]    Thrombocytopenia ([facility]

## 2024-11-18 NOTE — PROGRESS NOTES
Patient admitted to PACU -  Patient hypotensive and non responsive. Dr Jong Thompson at bedside- line and pressors initiated. PAST MEDICAL HISTORY:  Anxiety

## (undated) DEVICE — SOLUTION IV IRRIG WATER 1000ML POUR BRL 2F7114

## (undated) DEVICE — GOWN,SIRUS,FABRNF,XL,20/CS: Brand: MEDLINE

## (undated) DEVICE — DRAINBAG,ANTI-REFLUX TOWER,L/F,2000ML,LL: Brand: MEDLINE

## (undated) DEVICE — SOLUTION SURG PREP ANTIMICROBIAL 4 OZ SKIN WND EXIDINE

## (undated) DEVICE — DUP USE 240185 SOLUTION IV IRRIG WATER 1000ML IRRIG BAG 2B7114

## (undated) DEVICE — BAG SPECIMEN BIOHAZARD 6IN X 9IN

## (undated) DEVICE — HOSE CONN FOR WST MGMT SYS NEPTUNE 2

## (undated) DEVICE — 4-PORT MANIFOLD: Brand: NEPTUNE 2

## (undated) DEVICE — Z INACTIVE USE 2635503 SOLUTION IRRIG 3000ML ST H2O USP UROMATIC PLAS CONT

## (undated) DEVICE — Z INACTIVE USE 2660664 SOLUTION IRRIG 3000ML 0.9% SOD CHL USP UROMATIC PLAS CONT

## (undated) DEVICE — GAUZE,SPONGE,4"X4",8PLY,STRL,LF,10/TRAY: Brand: MEDLINE

## (undated) DEVICE — CATHETER URET 5FR L70CM OPN END SGL LUMN INJ HUB FLEXIMA

## (undated) DEVICE — CATHETER ETER URETH 30FR BLLN 5CC LTX 2 W F BARDX LUB

## (undated) DEVICE — COUNTER NDL 30 COUNT DBL MAG

## (undated) DEVICE — GUIDEWIRE ENDOSCP L150CM DIA0.035IN TIP 3CM PTFE NIT

## (undated) DEVICE — BAG DRNGE COMB PK

## (undated) DEVICE — GUIDEWIRE URO L150CM DIA0.035IN TAPR 3CM NIT HYDRPHLC ANG

## (undated) DEVICE — CYSTO PACK: Brand: MEDLINE INDUSTRIES, INC.

## (undated) DEVICE — INTENDED FOR TISSUE SEPARATION, AND OTHER PROCEDURES THAT REQUIRE A SHARP SURGICAL BLADE TO PUNCTURE OR CUT.: Brand: BARD-PARKER ® STAINLESS STEEL BLADES

## (undated) DEVICE — SPONGE,DRAIN,NONWVN,4"X4",6PLY,STRL,LF: Brand: MEDLINE

## (undated) DEVICE — SYRINGE MED 20ML STD CLR PLAS LUERLOCK TIP N CTRL DISP

## (undated) DEVICE — TUBING, SUCTION, 3/16" X 12', STRAIGHT: Brand: MEDLINE

## (undated) DEVICE — SYRINGE, LUER LOCK, 10ML: Brand: MEDLINE